# Patient Record
Sex: FEMALE | Race: WHITE | Employment: OTHER | URBAN - METROPOLITAN AREA
[De-identification: names, ages, dates, MRNs, and addresses within clinical notes are randomized per-mention and may not be internally consistent; named-entity substitution may affect disease eponyms.]

---

## 2018-03-14 ENCOUNTER — TRANSFERRED RECORDS (OUTPATIENT)
Dept: HEALTH INFORMATION MANAGEMENT | Facility: CLINIC | Age: 58
End: 2018-03-14

## 2019-01-22 PROBLEM — M21.619 BUNION: Status: ACTIVE | Noted: 2017-10-12

## 2019-01-22 NOTE — PROGRESS NOTES
"  SUBJECTIVE:   Jeannie Phelps is a 58 year old female who presents to clinic today for the following health issues:    New Patient/Transfer of Care    Hypertension Follow-up       Outpatient blood pressures are not being checked.    Low Salt Diet: not monitoring salt    Amount of exercise or physical activity: None    Problems taking medications regularly: No    Medication side effects: none    Diet: regular (no restrictions)    Prior medication trials -     Prior negative angiogram    Prior high stress job - retired this past year - gained 20 pounds     RESPIRATORY SYMPTOMS      Duration: Ongoing for 2 weeks - improving    Description  nasal congestion, sore throat and cough    Severity: mild    Accompanying signs and symptoms: None    History (predisposing factors):  none    Precipitating or alleviating factors: None    Therapies tried and outcome:  Dayquil / nyquil     No fever    No vomiting or diarrhea       Rash-   Controlled with topical steroid and Tazorac.  Continued on Singulair.  Uses as needed.    Affects trunk.  \"Felt like measles\".  Would bleed.  Pruritic.  Was on Plaquenil for 10 years.  Diagnosis of lupus made by biopsy.  Does get psoriasis like rash on scalp.    Takes Plexus -  Plant based omega, detox/cleanse, probiotic, multivitamin, cranberry concentrate 2 daily 500 mg, Ca/D 650 mg chew, and slim - blood sugar control.    Alcoholic-  No drink of choice, but wine most often.  No prior treatment.  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  Would work all day, drink in the evening.  Able to function and nobody knew.   Admits \"I like it.\"    Last night - had 4 whisky gingers.   drinks as well.    First time drunk - 10 years old - at a wedding.   Strong family history of addiction - all siblings alcoholics.  Sister left treatment and drank on her way home.  5 kids, 13 grand children.  Relocated here - 200 miles from family.    Leaving for 3 weeks to [place].    .  Last physical 3/2018.      Problem list " "and histories reviewed & adjusted, as indicated.  Additional history: as documented    Current Outpatient Medications   Medication     fluocinonide (LIDEX) 0.05 % external gel     montelukast (SINGULAIR) 10 MG tablet     Multiple Vitamins-Minerals (MULTIVITAMIN ADULT PO)     tazarotene (TAZORAC) 0.05 % external gel     No current facility-administered medications for this visit.        Patient Active Problem List   Diagnosis     Bunion     Dermatitis due to sun     Discoid lupus erythematosus     Essential hypertension     High risk medication use     Lyme disease     Symptomatic menopausal or female climacteric states     Hypertension     Alcoholism (H)     Past Surgical History:   Procedure Laterality Date      SECTION      3x     CHOLECYSTECTOMY       HYSTERECTOMY      ovaries remain; abnormal uterine bleeding     MANDIBLE SURGERY         Social History     Tobacco Use     Smoking status: Never Smoker     Smokeless tobacco: Never Used   Substance Use Topics     Alcohol use: Yes     Family History   Problem Relation Age of Onset     Breast Cancer Mother      Lung Cancer Father      Breast Cancer Maternal Grandmother      Breast Cancer Niece            Reviewed and updated as needed this visit by clinical staff       Reviewed and updated as needed this visit by Provider         ROS:  Constitutional, HEENT, cardiovascular, pulmonary, gi and gu systems are negative, except as otherwise noted.    OBJECTIVE:     /80 (BP Location: Right arm, Patient Position: Chair, Cuff Size: Adult Regular)   Pulse 100   Temp 97.9  F (36.6  C) (Tympanic)   Ht 1.664 m (5' 5.5\")   Wt 74.7 kg (164 lb 9.6 oz)   SpO2 96%   BMI 26.97 kg/m    Body mass index is 26.97 kg/m .  GENERAL: healthy, alert and no distress  EYES: Eyes grossly normal to inspection, PERRL and conjunctivae and sclerae normal  HENT: ear canals and TM's normal, nose and mouth without ulcers or lesions  NECK: no adenopathy, no asymmetry, masses, or scars " and thyroid normal to palpation  RESP: lungs clear to auscultation - no rales, rhonchi or wheezes  CV: regular rate and rhythm, normal S1 S2, no S3 or S4, no murmur, click or rub, no peripheral edema and peripheral pulses strong  ABDOMEN: soft, nontender, no hepatosplenomegaly, no masses and bowel sounds normal  MS: no gross musculoskeletal defects noted, no edema  SKIN: no suspicious lesions or rashes  PSYCH: mentation appears normal and tearful    Diagnostic Test Results:  none     ASSESSMENT/PLAN:     (Z00.00) Encounter for medical examination to establish care  (primary encounter diagnosis)  Comment: some records reviewed in care everywhere    (I10) Essential hypertension  Comment: stable  Plan: currently not on medication; alcohol likely a large contributing factor; close follow up    (F10.20) Alcoholism (H)  Comment: lengthy discussion today;  Patient in the contemplative stage.  Support offered.  Resources discussed.  Outpatient treatment available at [facility], [facility] and [facility].  Encouraged AA meetings, obtaining a sponsor.  Will schedule physical in 3/2019 when she is due.  Referral for care coordination for more assistance.  Referral for counseling - help to address her addiction.    Plan: CARE COORDINATION REFERRAL, MENTAL HEALTH         REFERRAL  - Adult; Outpatient Treatment;         Individual/Couples/Family/Group Therapy/Health         Psychology; Range: [facility] - [facility],         [place], [place] [phone]; We will         contact you to schedule the appointment or         please call ...    30 minutes spent with patient, over 50% in direct face to face counseling.    More Burris MD  [facility] - [facility]

## 2019-01-28 ENCOUNTER — OFFICE VISIT (OUTPATIENT)
Dept: FAMILY MEDICINE | Facility: OTHER | Age: 59
End: 2019-01-28
Attending: FAMILY MEDICINE
Payer: COMMERCIAL

## 2019-01-28 VITALS
SYSTOLIC BLOOD PRESSURE: 140 MMHG | TEMPERATURE: 97.9 F | WEIGHT: 164.6 LBS | DIASTOLIC BLOOD PRESSURE: 80 MMHG | HEIGHT: 66 IN | OXYGEN SATURATION: 96 % | HEART RATE: 100 BPM | BODY MASS INDEX: 26.45 KG/M2

## 2019-01-28 DIAGNOSIS — Z00.00 ENCOUNTER FOR MEDICAL EXAMINATION TO ESTABLISH CARE: Primary | ICD-10-CM

## 2019-01-28 DIAGNOSIS — I10 ESSENTIAL HYPERTENSION: ICD-10-CM

## 2019-01-28 DIAGNOSIS — F10.20 ALCOHOLISM (H): ICD-10-CM

## 2019-01-28 PROCEDURE — 99203 OFFICE O/P NEW LOW 30 MIN: CPT | Performed by: FAMILY MEDICINE

## 2019-01-28 RX ORDER — MONTELUKAST SODIUM 10 MG/1
10 TABLET ORAL DAILY
COMMUNITY
Start: 2019-01-10 | End: 2023-07-12

## 2019-01-28 RX ORDER — FLUOCINONIDE GEL 0.5 MG/G
1 GEL TOPICAL PRN
COMMUNITY
Start: 2017-01-24

## 2019-01-28 RX ORDER — TAZAROTENE 0.5 MG/G
1 GEL TOPICAL PRN
COMMUNITY
Start: 2016-08-25

## 2019-01-28 ASSESSMENT — PAIN SCALES - GENERAL: PAINLEVEL: NO PAIN (0)

## 2019-01-28 ASSESSMENT — MIFFLIN-ST. JEOR: SCORE: 1340.43

## 2019-01-28 NOTE — PATIENT INSTRUCTIONS
Schedule annual physical after 3/13/19.  Referral for care coordination services and counseling.    Outpatient chemical dependency treatment:  [facility]    Follow up 1 month - reassess progress.     Psychologists/ Counselors      Raj Sierra   [phone]  Wandy Quinns   [phone]  [facility]  [phone]  [facility] (kids) [phone]  [facility](teens) [phone]  [facility]  [phone]  [facility]  [phone]  [facility]  [phone]  [facility]       [phone]   [facility]  [phone]  [facility] [phone]  [facility]  [phone]  [facility]  [phone]     [facility] [phone]      [facility] counseling [phone]  Malick Mojo   [phone]  Wes Roth  [phone]  Nikia Overton  [phone]  [facility] [phone]  [facility]      [phone]  [facility]       [phone]  [facility]  [phone]     [place]  Antonio Medinaen   [phone]  [facility]      [phone]  [facility] [address] W[address] [place] [phone]  [facility]      [phone]  [facility]  [phone]        *Facilities in bold italics indicate medication management  Services are offered.        Crisis Text Line  http://www.crisistextline.org       The Crisis Text Line serves anyone, in any type of crisis, providing access to free, 24/7 support and information via the medium people already use and trust:     Here's how it works:  Text HOME to 844-889 from anywhere in the [place], anytime, about any type of crisis.  A live, trained Crisis Counselor receives the text and responds quickly.  The volunteer Crisis Counselor will help you move from a 'hot moment to a cool moment'

## 2019-01-28 NOTE — NURSING NOTE
"Chief Complaint   Patient presents with     Establish Care       Initial /80 (BP Location: Right arm, Patient Position: Chair, Cuff Size: Adult Regular)   Pulse 100   Temp 97.9  F (36.6  C) (Tympanic)   Ht 1.664 m (5' 5.5\")   Wt 74.7 kg (164 lb 9.6 oz)   SpO2 96%   BMI 26.97 kg/m   Estimated body mass index is 26.97 kg/m  as calculated from the following:    Height as of this encounter: 1.664 m (5' 5.5\").    Weight as of this encounter: 74.7 kg (164 lb 9.6 oz).  Medication Reconciliation: complete    Shirin Corona LPN    "

## 2019-01-29 ENCOUNTER — PATIENT OUTREACH (OUTPATIENT)
Dept: CARE COORDINATION | Facility: OTHER | Age: 59
End: 2019-01-29

## 2019-01-29 NOTE — PROGRESS NOTES
Clinic Care Coordination Contact  Care Team Conversations    Was asked to assist pt with mental health/chemical dependency resources.  After peforming chart review, discovered pt will be out of the area for 3 weeks.  Will follow up when she returns.    Trinidad Sousa, [facility]  Outpatient   [phone]

## 2019-03-06 ENCOUNTER — PATIENT OUTREACH (OUTPATIENT)
Dept: CARE COORDINATION | Facility: OTHER | Age: 59
End: 2019-03-06

## 2019-03-06 NOTE — PROGRESS NOTES
Clinic Care Coordination Contact  [facility]/Voicemail       Clinical Data: Care Coordinator Outreach  Outreach attempted x 1.  Left message on voicemail with call back information and requested return call.    Was asked to contact patient for assistance with chemical dependency resources.      Plan: Care Coordinator will mail out care coordination introduction letter with care coordinator contact information and explanation of care coordination services. Care Coordinator will try to reach patient again in 3-5 business days.    SONIDO Allred  Outpatient   [phone]

## 2019-03-06 NOTE — LETTER
[place] CARE COORDINATION  March 12, 2019    Jeannie Phelps  [address]  [place] [address]      Dear Jeannie,    I am a clinic care coordinator who works with Dr. Martinez at [facility]. I have been trying to reach you recently to introduce Clinic Care Coordination and to see if there was anything I could assist you with.  I wanted to introduce myself and provide you with my contact information so that you can call me with questions or concerns about your health care. Below is a description of clinic care coordination and how I can further assist you.     The clinic care coordinator is a registered nurse and/or  who understand the health care system. The goal of clinic care coordination is to help you manage your health and improve access to the [facility] system in the most efficient manner. The registered nurse can assist you in meeting your health care goals by providing education, coordinating services, and strengthening the communication among your providers. The  can assist you with financial, behavioral, psychosocial, chemical dependency, counseling, and/or psychiatric resources.    Please feel free to contact me at [phone], with any questions or concerns. We at [facility] are focused on providing you with the highest-quality healthcare experience possible and that all starts with you.     Sincerely,     Trinidad Sousa

## 2019-03-12 NOTE — PROGRESS NOTES
Clinic Care Coordination Contact  [facility]/Voicemail       Clinical Data: Care Coordinator Outreach  Outreach attempted x 2.  Left message on voicemail with call back information and requested return call.  Plan: Care Coordinator will mail out care coordination introduction letter with care coordinator contact information and explanation of care coordination services. Care Coordinator will do no further outreaches at this time.

## 2019-03-14 NOTE — PROGRESS NOTES
Received return call on 3/13 that pt was flying out.  Asked writer to call back.  Called pt, but she was unavailable.  Left message for her to return call when available.    Trinidad Sousa, [facility]  Outpatient   [phone]

## 2021-06-23 ENCOUNTER — TRANSFERRED RECORDS (OUTPATIENT)
Dept: HEALTH INFORMATION MANAGEMENT | Facility: CLINIC | Age: 61
End: 2021-06-23

## 2022-07-12 ENCOUNTER — HOSPITAL ENCOUNTER (EMERGENCY)
Facility: HOSPITAL | Age: 62
Discharge: HOME | End: 2022-07-12
Attending: NURSE PRACTITIONER | Admitting: NURSE PRACTITIONER
Payer: COMMERCIAL

## 2022-07-12 ENCOUNTER — APPOINTMENT (OUTPATIENT)
Dept: ULTRASOUND IMAGING | Facility: HOSPITAL | Age: 62
End: 2022-07-12
Attending: NURSE PRACTITIONER
Payer: COMMERCIAL

## 2022-07-12 VITALS
HEART RATE: 87 BPM | DIASTOLIC BLOOD PRESSURE: 100 MMHG | RESPIRATION RATE: 16 BRPM | SYSTOLIC BLOOD PRESSURE: 154 MMHG | TEMPERATURE: 98.4 F | OXYGEN SATURATION: 97 %

## 2022-07-12 DIAGNOSIS — T14.8XXA HEMATOMA OF SKIN: ICD-10-CM

## 2022-07-12 PROCEDURE — 99213 OFFICE O/P EST LOW 20 MIN: CPT | Performed by: NURSE PRACTITIONER

## 2022-07-12 PROCEDURE — G0463 HOSPITAL OUTPT CLINIC VISIT: HCPCS | Mod: 25

## 2022-07-12 PROCEDURE — 93971 EXTREMITY STUDY: CPT | Mod: LT

## 2022-07-12 RX ORDER — EPINEPHRINE 0.3 MG/.3ML
INJECTION SUBCUTANEOUS
COMMUNITY
Start: 2021-09-02 | End: 2023-09-12

## 2022-07-12 ASSESSMENT — ENCOUNTER SYMPTOMS
LIGHT-HEADEDNESS: 0
VOMITING: 0
FATIGUE: 1
CHILLS: 0
APPETITE CHANGE: 0
COLOR CHANGE: 1
NUMBNESS: 0
HEADACHES: 0
NAUSEA: 0
DIZZINESS: 0
SHORTNESS OF BREATH: 0
ACTIVITY CHANGE: 1
FEVER: 0

## 2022-07-12 NOTE — DISCHARGE INSTRUCTIONS
Keep affected extremity elevated as much as possible for next 24 - 48 hours. Ice to affected area 20 minutes every hour as needed for comfort. After 48 hours you can apply heat. Ibuprofen 600 to 800 mg (3 - 4 tabs of over the counter med) every six to eight hours as needed;not to exceed maximum amount of 3200 mg in 24 hours. Take with food. Tylenol 650 to 1000 mg every four to six hours as needed (not to exceed more than 4000 mg in a 24 hour period). May use interchangeably. Suggest medicating around the clock for the next 24-48 hours. Use ace wrap  until you are no longer having discomfort in your leg.  Slowly start to wiggle your toes and move foot and leg as often as possible but not beyond the point of pain. Follow up with primary provider as needed

## 2022-07-12 NOTE — ED PROVIDER NOTES
History     Chief Complaint   Patient presents with     Leg Pain     HPI  Jeannie Phelps is a 62 year old female who presents with left leg pain that started 2 days ago.  Pain radiates up into her posterior thigh and down into the posterior calf.  Accompanied with fatigue.  Has taken ibuprofen yesterday afternoon that did help to decrease her symptoms a little bit.  Has been doing quite a bit of yard work this past week.  Has ecchymotic area behind left knee.  Has a history of varicose veins.  Had varicose veins removed 15 years ago.  Concerns regarding DVT.  Non-smoker. Denies fevers, chills, nausea, vomiting, diarrhea, and shortness of breath.    Musculoskeletal problem/pain      Duration: two days ago    Description  Location: left leg    Intensity:  4/10    Accompanying signs and symptoms: radiation of pain to posterior thigh and calf    History  Previous similar problem: YES- had varicose veins removed 15 years ago  Previous evaluation:  none    Precipitating or alleviating factors:  Trauma or overuse: YES- has been doing yard work  Aggravating factors include: sitting and unable to sleep last night because of discomfort    Therapies tried and outcome:  Ibuprofen yesterday afternoon did help to decrease symptoms little bit.     Allergies:  Allergies   Allergen Reactions     Rofecoxib Other (See Comments)     Pt had problems with bleeding with urination, Verified in Meditech: Y, Severity in [employer]itech: S, Vioxx TABS     Hydrocodone Itching     Nerve sensation      Influenza Vaccines      pt's heart races., Verified in Meditech: Y, Severity in [employer]itech: S     Metoprolol Other (See Comments)     Hot flashes     Amlodipine Other (See Comments) and Palpitations     Hot flashes     Cefuroxime Rash     Cefuroxime Axetil TABS - Rash     Lisinopril Other (See Comments) and Palpitations       Problem List:    Patient Active Problem List    Diagnosis Date Noted     Hypertension      Priority: Medium     Alcoholism  (H)      Priority: Medium     Bunion 10/12/2017     Priority: Medium     Overview:   Added automatically from request for surgery [id number]       Essential hypertension 2016     Priority: Medium     High risk medication use 2014     Priority: Medium     Dermatitis due to sun 2013     Priority: Medium     Symptomatic menopausal or female climacteric states 2013     Priority: Medium     Lyme disease 2013     Priority: Medium     Overview:   Lyme Disease-Deer tick bite 13.  Initially noted a red target lesion on   right lower back       Discoid lupus erythematosus 2008     Priority: Medium        Past Medical History:    Past Medical History:   Diagnosis Date     Alcoholism (H)      Hypertension        Past Surgical History:    Past Surgical History:   Procedure Laterality Date      SECTION      3x     CHOLECYSTECTOMY       HYSTERECTOMY VAGINAL      Cervix status unknown. Bilateral ovaries remain; Performed for abnormal uterine bleeding. Per [facility] Everywhere Summary.      MANDIBLE SURGERY         Family History:    Family History   Problem Relation Age of Onset     Breast Cancer Mother      Lung Cancer Father      Breast Cancer Maternal Grandmother      Alcoholism Brother      Alcoholism Sister      Breast Cancer Niece        Social History:  Marital Status:   [2]  Social History     Tobacco Use     Smoking status: Never Smoker     Smokeless tobacco: Never Used   Substance Use Topics     Alcohol use: Yes     Drug use: No        Medications:    EPINEPHrine (ANY BX GENERIC EQUIV) 0.3 MG/0.3ML injection 2-pack  fluocinonide (LIDEX) 0.05 % external gel  montelukast (SINGULAIR) 10 MG tablet  Multiple Vitamins-Minerals (MULTIVITAMIN ADULT PO)  tazarotene (TAZORAC) 0.05 % external gel          Review of Systems   Constitutional: Positive for activity change and fatigue. Negative for appetite change, chills and fever.   Respiratory: Negative for  shortness of breath.    Gastrointestinal: Negative for nausea and vomiting.   Genitourinary: Negative.    Musculoskeletal:        Hot burning sensation posterior knee that radiates into posterior calf and thigh (3/4th). Fourth and third toes have same sensation.   Skin: Positive for color change.   Neurological: Negative for dizziness, light-headedness, numbness and headaches.       Physical Exam   BP: (!) 177/119 (working with PCP to find the right blood pressure med. Appt. Monday)  Pulse: 90  Temp: 98.4  F (36.9  C)  Resp: 16  SpO2: 97 %      Physical Exam  Vitals and nursing note reviewed.   Constitutional:       General: She is in acute distress (Mild).      Appearance: She is overweight.   Cardiovascular:      Rate and Rhythm: Normal rate and regular rhythm.      Heart sounds: Normal heart sounds. No murmur heard.  Pulmonary:      Effort: Pulmonary effort is normal. No respiratory distress.      Breath sounds: Normal breath sounds. No wheezing or rales.   Musculoskeletal:         General: Tenderness present. No swelling.        Legs:    Skin:     Capillary Refill: Capillary refill takes less than 2 seconds.      Findings: Bruising present. No erythema.   Neurological:      Mental Status: She is alert and oriented to person, place, and time.   Psychiatric:         Behavior: Behavior normal.         ED Course                 Procedures             Results for orders placed or performed during the hospital encounter of 07/12/22 (from the past 24 hour(s))   US Lower Extremity Venous Duplex Left    Narrative    Exam:US LOWER EXTREMITY VENOUS DUPLEX LEFT    History: Left leg pain. Bruising    Comparisons:    Technique: Venous duplex ultrasonography of the left lower extremity  was performed.     Findings: The common femoral vein, superficial femoral vein and  popliteal vein are fully compressible with spontaneous and augmentable  venous flow. There is some increased echogenicity seen in the area of  the bruise in the  subcutaneous fat           Impression    Impression: No evidence of deep venous thrombosis within the left  lower extremity.    GAVINO BRADSHAW MD         SYSTEM ID:  [id number]       Medications - No data to display    Assessments & Plan (with Medical Decision Making)     I have reviewed the nursing notes.    I have reviewed the findings, diagnosis, plan and need for follow up with the patient.  (T14.8XXA) Hematoma of skin  Comment:  62 year old female who presents with left leg pain that started 2 days ago.  Pain radiates up into her posterior thigh and down into the posterior calf.  Accompanied with fatigue.  Has taken ibuprofen yesterday afternoon that did help to decrease her symptoms a little bit.  Has been doing quite a bit of yard work this past week.  Has ecchymotic area behind left knee.  Has a history of varicose veins.  Had varicose veins removed 15 years ago.  Concerns regarding DVT.  Non-smoker. Denies fevers, chills, nausea, vomiting, diarrhea, and shortness of breath.    MDM: NHT. Lungs CTA  3 cm hematoma noted superior to left knee on medial, posterior, left leg  Left popliteal and pedal pulses obtained with Doppler    Ultrasound per radiology; no evidence of deep venous thrombosis within the left lower extremity    Ace wrap applied to left knee per LPN.    Refused pain medication.    Plan: Education provided for hematoma.  Keep affected extremity elevated as much as possible for next 24 - 48 hours. Ice to affected area 20 minutes every hour as needed for comfort. After 48 hours you can apply heat. Ibuprofen 600 to 800 mg (3 - 4 tabs of over the counter med) every six to eight hours as needed;not to exceed maximum amount of 3200 mg in 24 hours. Take with food. Tylenol 650 to 1000 mg every four to six hours as needed (not to exceed more than 4000 mg in a 24 hour period). May use interchangeably. Suggest medicating around the clock for the next 24-48 hours. Use ace wrap  until you are no longer having  discomfort in your leg.  Slowly start to wiggle your toes and move foot and leg as often as possible but not beyond the point of pain. Follow up with primary provider as needed  These discharge instructions and medications were reviewed with her and understanding verbalized.    This document was prepared using a combination of typing and voice generated software.  While every attempt was made for accuracy, spelling and grammatical errors may exist.    Discharge Medication List as of 7/12/2022  1:51 PM          Final diagnoses:   Hematoma of skin       7/12/2022   [facility]       Tiffanie Damon, CNP  07/12/22 7810

## 2022-07-12 NOTE — ED TRIAGE NOTES
"Patient reports seeing chiropracter today when they noticed that patient has a hematoma in back of left leg, above knee.  Patient reports that pain in the back started a couple days ago. Patient reports that it is hot and burning and \"feels deep.\"      "

## 2022-07-12 NOTE — ED TRIAGE NOTES
Patient presents to urgent care for possible blood clot behind the left knee. Patient went to the chiropractor today and the chiropractor noticed a hematoma behind the left leg above the knee. Patient was told to go to her doctor. [place] [facility] [place] to go to come to the ER.

## 2022-07-18 ENCOUNTER — TRANSFERRED RECORDS (OUTPATIENT)
Dept: SURGERY | Facility: HOSPITAL | Age: 62
End: 2022-07-18

## 2023-03-15 ENCOUNTER — APPOINTMENT (OUTPATIENT)
Dept: CT IMAGING | Facility: HOSPITAL | Age: 63
End: 2023-03-15
Attending: EMERGENCY MEDICINE
Payer: COMMERCIAL

## 2023-03-15 ENCOUNTER — HOSPITAL ENCOUNTER (EMERGENCY)
Facility: HOSPITAL | Age: 63
Discharge: ANOTHER HOSPITAL | End: 2023-03-15
Attending: EMERGENCY MEDICINE | Admitting: EMERGENCY MEDICINE
Payer: COMMERCIAL

## 2023-03-15 VITALS
SYSTOLIC BLOOD PRESSURE: 150 MMHG | HEART RATE: 89 BPM | DIASTOLIC BLOOD PRESSURE: 86 MMHG | OXYGEN SATURATION: 97 % | TEMPERATURE: 97.7 F | BODY MASS INDEX: 27.24 KG/M2 | RESPIRATION RATE: 18 BRPM | WEIGHT: 166.2 LBS

## 2023-03-15 DIAGNOSIS — I63.9 CEREBROVASCULAR ACCIDENT (CVA), UNSPECIFIED MECHANISM (H): ICD-10-CM

## 2023-03-15 LAB
ANION GAP SERPL CALCULATED.3IONS-SCNC: 11 MMOL/L (ref 7–15)
APTT PPP: 29 SECONDS (ref 22–38)
BASOPHILS # BLD AUTO: 0.1 10E3/UL (ref 0–0.2)
BASOPHILS NFR BLD AUTO: 1 %
BUN SERPL-MCNC: 11.9 MG/DL (ref 8–23)
CALCIUM SERPL-MCNC: 9.9 MG/DL (ref 8.8–10.2)
CHLORIDE SERPL-SCNC: 102 MMOL/L (ref 98–107)
CREAT SERPL-MCNC: 0.63 MG/DL (ref 0.51–0.95)
DEPRECATED HCO3 PLAS-SCNC: 26 MMOL/L (ref 22–29)
EOSINOPHIL # BLD AUTO: 0.1 10E3/UL (ref 0–0.7)
EOSINOPHIL NFR BLD AUTO: 2 %
ERYTHROCYTE [DISTWIDTH] IN BLOOD BY AUTOMATED COUNT: 12.2 % (ref 10–15)
GFR SERPL CREATININE-BSD FRML MDRD: >90 ML/MIN/1.73M2
GLUCOSE BLDC GLUCOMTR-MCNC: 81 MG/DL (ref 70–99)
GLUCOSE SERPL-MCNC: 84 MG/DL (ref 70–99)
HCT VFR BLD AUTO: 41 % (ref 35–47)
HGB BLD-MCNC: 14.2 G/DL (ref 11.7–15.7)
HOLD SPECIMEN: NORMAL
IMM GRANULOCYTES # BLD: 0 10E3/UL
IMM GRANULOCYTES NFR BLD: 0 %
INR PPP: 0.97 (ref 0.85–1.15)
LYMPHOCYTES # BLD AUTO: 2.2 10E3/UL (ref 0.8–5.3)
LYMPHOCYTES NFR BLD AUTO: 40 %
MCH RBC QN AUTO: 35.4 PG (ref 26.5–33)
MCHC RBC AUTO-ENTMCNC: 34.6 G/DL (ref 31.5–36.5)
MCV RBC AUTO: 102 FL (ref 78–100)
MONOCYTES # BLD AUTO: 0.5 10E3/UL (ref 0–1.3)
MONOCYTES NFR BLD AUTO: 9 %
NEUTROPHILS # BLD AUTO: 2.5 10E3/UL (ref 1.6–8.3)
NEUTROPHILS NFR BLD AUTO: 48 %
NRBC # BLD AUTO: 0 10E3/UL
NRBC BLD AUTO-RTO: 0 /100
PLATELET # BLD AUTO: 148 10E3/UL (ref 150–450)
POTASSIUM SERPL-SCNC: 4.2 MMOL/L (ref 3.4–5.3)
RBC # BLD AUTO: 4.01 10E6/UL (ref 3.8–5.2)
SARS-COV-2 RNA RESP QL NAA+PROBE: NEGATIVE
SODIUM SERPL-SCNC: 139 MMOL/L (ref 136–145)
TROPONIN T SERPL HS-MCNC: 8 NG/L
WBC # BLD AUTO: 5.3 10E3/UL (ref 4–11)

## 2023-03-15 PROCEDURE — 70496 CT ANGIOGRAPHY HEAD: CPT

## 2023-03-15 PROCEDURE — 70450 CT HEAD/BRAIN W/O DYE: CPT

## 2023-03-15 PROCEDURE — 99291 CRITICAL CARE FIRST HOUR: CPT | Mod: 25

## 2023-03-15 PROCEDURE — 80048 BASIC METABOLIC PNL TOTAL CA: CPT | Performed by: EMERGENCY MEDICINE

## 2023-03-15 PROCEDURE — 85610 PROTHROMBIN TIME: CPT | Performed by: EMERGENCY MEDICINE

## 2023-03-15 PROCEDURE — 99292 CRITICAL CARE ADDL 30 MIN: CPT

## 2023-03-15 PROCEDURE — 85004 AUTOMATED DIFF WBC COUNT: CPT | Performed by: EMERGENCY MEDICINE

## 2023-03-15 PROCEDURE — C9803 HOPD COVID-19 SPEC COLLECT: HCPCS

## 2023-03-15 PROCEDURE — 84484 ASSAY OF TROPONIN QUANT: CPT | Performed by: EMERGENCY MEDICINE

## 2023-03-15 PROCEDURE — U0005 INFEC AGEN DETEC AMPLI PROBE: HCPCS | Performed by: EMERGENCY MEDICINE

## 2023-03-15 PROCEDURE — 250N000011 HC RX IP 250 OP 636: Performed by: STUDENT IN AN ORGANIZED HEALTH CARE EDUCATION/TRAINING PROGRAM

## 2023-03-15 PROCEDURE — 36415 COLL VENOUS BLD VENIPUNCTURE: CPT | Performed by: EMERGENCY MEDICINE

## 2023-03-15 PROCEDURE — 96375 TX/PRO/DX INJ NEW DRUG ADDON: CPT | Mod: XU

## 2023-03-15 PROCEDURE — 250N000011 HC RX IP 250 OP 636

## 2023-03-15 PROCEDURE — 85730 THROMBOPLASTIN TIME PARTIAL: CPT | Performed by: EMERGENCY MEDICINE

## 2023-03-15 PROCEDURE — 96365 THER/PROPH/DIAG IV INF INIT: CPT | Mod: XU

## 2023-03-15 PROCEDURE — 93005 ELECTROCARDIOGRAM TRACING: CPT

## 2023-03-15 PROCEDURE — 99285 EMERGENCY DEPT VISIT HI MDM: CPT | Performed by: STUDENT IN AN ORGANIZED HEALTH CARE EDUCATION/TRAINING PROGRAM

## 2023-03-15 PROCEDURE — 250N000011 HC RX IP 250 OP 636: Performed by: EMERGENCY MEDICINE

## 2023-03-15 PROCEDURE — 70498 CT ANGIOGRAPHY NECK: CPT

## 2023-03-15 PROCEDURE — 999N000157 HC STATISTIC RCP TIME EA 10 MIN

## 2023-03-15 PROCEDURE — 82962 GLUCOSE BLOOD TEST: CPT

## 2023-03-15 RX ORDER — SODIUM CHLORIDE 9 MG/ML
INJECTION, SOLUTION INTRAVENOUS CONTINUOUS PRN
Status: DISCONTINUED | OUTPATIENT
Start: 2023-03-15 | End: 2023-03-15 | Stop reason: HOSPADM

## 2023-03-15 RX ORDER — LISINOPRIL 5 MG/1
5 TABLET ORAL DAILY
COMMUNITY
End: 2023-07-12

## 2023-03-15 RX ORDER — ROSUVASTATIN CALCIUM 5 MG/1
5 TABLET, COATED ORAL DAILY
COMMUNITY
End: 2023-07-12

## 2023-03-15 RX ORDER — HYDRALAZINE HYDROCHLORIDE 20 MG/ML
10 INJECTION INTRAMUSCULAR; INTRAVENOUS EVERY 10 MIN PRN
Status: DISCONTINUED | OUTPATIENT
Start: 2023-03-15 | End: 2023-03-15 | Stop reason: HOSPADM

## 2023-03-15 RX ORDER — IOPAMIDOL 755 MG/ML
45 INJECTION, SOLUTION INTRAVASCULAR ONCE
Status: COMPLETED | OUTPATIENT
Start: 2023-03-15 | End: 2023-03-15

## 2023-03-15 RX ORDER — LABETALOL 20 MG/4 ML (5 MG/ML) INTRAVENOUS SYRINGE
10 EVERY 10 MIN PRN
Status: DISCONTINUED | OUTPATIENT
Start: 2023-03-15 | End: 2023-03-15 | Stop reason: HOSPADM

## 2023-03-15 RX ADMIN — NICARDIPINE HYDROCHLORIDE 2.5 MG/HR: 0.2 INJECTION, SOLUTION INTRAVENOUS at 07:36

## 2023-03-15 RX ADMIN — LABETALOL HYDROCHLORIDE 10 MG: 5 INJECTION, SOLUTION INTRAVENOUS at 07:42

## 2023-03-15 RX ADMIN — Medication 19 MG: at 07:52

## 2023-03-15 RX ADMIN — IOPAMIDOL 45 ML: 755 INJECTION, SOLUTION INTRAVENOUS at 07:09

## 2023-03-15 ASSESSMENT — ACTIVITIES OF DAILY LIVING (ADL)
ADLS_ACUITY_SCORE: 35
ADLS_ACUITY_SCORE: 35

## 2023-03-15 ASSESSMENT — ENCOUNTER SYMPTOMS
SHORTNESS OF BREATH: 0
FEVER: 0
CHILLS: 0
COUGH: 0

## 2023-03-15 NOTE — ED NOTES
0600 pt was in kitchen and suddenly got dizzy and her right arm stopped working and feeling right. Pt awoke 0430 felt completely normal. 0711 right leg started feeling weird, and shaky.

## 2023-03-15 NOTE — CONSULTS
"  [facility]    Stroke Telephone Note    I was called by Terry Eaton on 03/15/23 regarding patient Jeannie Phelps.     Haim Redd is a 62 YO F w/vascular RFs: HTN on lisinopril who presents on 3/15 as a tier 1 stroke code with acute onset RUE paresis.     LKW: Woke up at 0430 w/o sx's.   First seen sick: Noted acute onset RUE paresis at 0600 while in shower.     In ED exam notable for RUE paresis (antigravity but reduced throughout, sig distal weakness).     In ED /95. Initial labs platelets 148.    NCCT: Unremarkable.   CTA H/N: No high grade stenosis    Stroke Code Data (for stroke code without tele)  Stroke code activated 03/15/23   0655   Stroke provider first response  03/15/23   0659            Last known normal 03/15/23   0430        Time of discovery   (or onset of symptoms) 03/15/23   0600   Head CT read by Stroke Neuro Dr/Provider 03/15/23       Was stroke code de-escalated?                  Intravenous Thrombolysis  Pending Televideo exam    Endovascular Treatment  Not initiated due to absence of proximal vessel occlusion    Impression/Recommendations  # Stroke by clinical exam. No CI to TNK, coag labs, platelets appropriate.   - Telestroke team will see on camera to determine TNK appropriateness - signout completed, N team aware and taking over case - appreciate team effort/assistance     My recommendations are based on the information provided over the phone by Jeannie Phelps's in-person providers. They are not intended to replace the clinical judgment of her in-person providers. I was not requested to personally see or examine the patient at this time.    The Stroke Staff is Dr. Vang.    Jhonny Merlos MD  Vascular Neurology Fellow    To page me or covering stroke neurology team member, click here: AMCOM  Choose \"On Call\" tab at top, then select \"NEUROLOGY/ALL SITES\" from middle drop-down box, press Enter, then look for \"stroke\" or \"telestroke\" for your " site.

## 2023-03-15 NOTE — DISCHARGE INSTRUCTIONS
What to expect when you have contrast    During your exam, we will inject  contrast  into your vein or artery. (Contrast is a clear liquid with iodine in it. It shows up on X-rays.)    You may feel warm or hot. You may have a metal taste in your mouth and a slight upset stomach. You may also feel pressure near the kidneys and bladder. These effects will last about 1 to 3 minutes.    Please tell us if you have:   Sneezing    Itching   Hives    Swelling in the face   A hoarse voice   Breathing problems   Other new symptoms    Serious problems are rare.  They may include:   Irregular heartbeat    Seizures   Kidney failure             Tissue damage   Shock     Death    If you have any problems during the exam, we  will treat them right away.    When you get home    Call your hospital if you have any new symptoms in the next 2 days, like hives or swelling. (Phone numbers are at the bottom of this page.) Or call your family doctor.     If you have wheezing or trouble breathing, call 911.    Self-care  -Drink at least 4 extra glasses of water today.   This reduces the stress on your kidneys.  -Keep taking your regular medicines.    The contrast will pass out of your body in your  Urine(pee). This will happen in the next 24 hours. You  will not feel this. Your urine will not  change color.    If you have kidney problems or take metformin    Drink 4 to 8 large glasses of water for the next  2 days, if you are not on a fluid restriction.    ?If you take metformin (Glucophage or Glucovance) for diabetes, keep taking it.      ?Your kidney function tests are abnormal.  If you take Metformin, do not take it for 48 hours. Please go to your clinic for a blood test within 3 days after your exam before the restarting this medicine.     (Note to provider:please give patient prescription for lab tests.)    ?Special instructions: -    I have read and understand the above information.    Patient Sign  Here:______________________________________Date:________Time:______    Staff Sign Here:________________________________________Date:_______Time:______      Radiology Departments:     ?[facility]: [phone] ?[place]: [phone]     ?[place]: [phone] ?[place]:[phone]      ?[place]: [phone]  ?[place]: [phone]  ?[place]:[phone]    ?[facility]:[phone]  ?[facility]:[phone]

## 2023-03-15 NOTE — CONSULTS
[facility]    Stroke Consult Note    Reason for Consult: Stroke Code     Chief Complaint: No chief complaint on file.      HPI  Jeannie Phelps is a 63 year old female with a PMHx significant for HTN, lupus, alcohol use disorder. She woke up this morning at her baseline around 0430. Around 0545, she had sudden onset dizziness and right arm weakness, right leg weakness (arm weaker than leg), and incoordination.   She is not on aspirin or any anticoagulation. She does take ibuprofen daily for chronic back pain. There is a diagnosis of lupus in her chart. She reports that she was diagnosed years ago, was on plaquenil for ~ 10 years, and then was told it was a misdiagnosis.   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   No history of bleeding/clotting disorders, no PEs, no DVTs, no recurrent miscarriages.   TNK given 0753 3/15 after blood pressure was controlled with nicardipine and labetalol.   Patient and her  were updated on our findings and plan of care during telestroke visit. Questions answered; they are in agreement with the plan.     Imaging Findings  CT Head  1. No acute intracranial hemorrhage  2. ASPECT SCORE 10/10.    CTA Head/Neck (prelim read, radiology final read pending)  No LVO, no significant stenoses     CBC RESULTS: Recent Labs   Lab Test 03/15/23  0659   WBC 5.3   RBC 4.01   HGB 14.2   HCT 41.0   *   MCH 35.4*   MCHC 34.6   RDW 12.2   *     INR   Date Value Ref [place] Status   03/15/2023 0.97 0.85 - 1.15 Final        Intravenous Thrombolysis  Risks (including potential for bleeding and death), benefits, and alternatives to thrombolytic therapy were discussed with Patient and Family. Prior to tenecteplase (TNK) administration, the following issues were addressed:   - hypertension requiring aggressive control with IV medications    Endovascular Treatment  Not initiated due to absence of proximal vessel occlusion    Impression   Right  "hemiparesis (UE weaker than LE) and subtle right facial weakness concerning for acute stroke s/p TNK     Recommendations  - Use orderset: \"Ischemic Stroke Post-Thrombolytics/Thrombectomy ICU Admission\"  - Neurochecks and vital signs per post-thrombolytic orders and monitor closely for any evidence of CNS hemorrhage, bleeding, or orolingual angioedema  - Goal BP < 180 / 105  - Hold all antithrombotic and anticoagulant medications for 24 hrs post-thrombolytic  - Hold pharmacologic VTE prophylaxis for 24 hrs post-thrombolytic  - Statin:  Continue PTA rosuvastatin 5 mg daily, may need to titrate based on lipid panel results, goal LDL 40-70  - Repeat Head CT 24 hrs post-thrombolytic  - MRI Brain with and without contrast (non-urgent)  - TTE (with Bubble Study if age 60 yrs or less)  - Telemetry, EKG  - Bedside Glucose Monitoring, euglycemia   - Nutrition: Mediterranean diet recommended   - A1c, Lipid Panel, Troponin x 3  - PT/OT/SLP  - Stroke Education/Stroke Class per [facility]  - Depression Screen  - Apnea screening positive; please place referral to sleep clinic   - Euthermia     Patient Follow-up     Recommendations pending work-up    Thank you for this consult. Patient being transferred outside of [facility] [place] system due to pt requiring higher level of care and bed availability.      Bhavna Keith NP  Vascular Neurology    To page me or covering stroke neurology team member, click here: [facility]  Choose \"On Call\" tab at top, then select \"NEUROLOGY/ALL SITES\" from middle drop-down box, press Enter, then look for \"stroke\" or \"telestroke\" for your site.    ______________________________________________________    Clinically Significant Risk Factors Present on Admission                  # Hypertension: home medication list includes antihypertensive(s)            Past Medical History   Past Medical History:   Diagnosis Date     Alcoholism (H)      Hypertension      Past Surgical History   Past " Surgical History:   Procedure Laterality Date      SECTION      3x     CHOLECYSTECTOMY       HYSTERECTOMY VAGINAL  2008    Cervix status unknown. Bilateral ovaries remain; Performed for abnormal uterine bleeding. Per [facility] Everywhere Summary.      MANDIBLE SURGERY       Medications   Home Meds  Prior to Admission medications    Medication Sig Start Date End Date Taking? Authorizing Provider   EPINEPHrine (ANY BX GENERIC EQUIV) 0.3 MG/0.3ML injection 2-pack INJECT CONTENTS OF 1 PEN AS NEEDED FOR ALLERGIC REACTION 21  Yes Reported, Patient   fluocinonide (LIDEX) 0.05 % external gel Apply 1 drop topically as needed 17  Yes Reported, Patient   lisinopril (ZESTRIL) 5 MG tablet Take 5 mg by mouth daily   Yes Reported, Patient   montelukast (SINGULAIR) 10 MG tablet Take 10 mg by mouth daily 1/10/19  Yes Reported, Patient   Multiple Vitamins-Minerals (MULTIVITAMIN ADULT PO) Take 1 tablet by mouth daily   Yes Reported, Patient   rosuvastatin (CRESTOR) 5 MG tablet Take 5 mg by mouth daily   Yes Reported, Patient   tazarotene (TAZORAC) 0.05 % external gel Apply 1 drop topically as needed 16   Reported, Patient       Scheduled Meds      Infusion Meds      PRN Meds      Allergies   Allergies   Allergen Reactions     Rofecoxib Other (See Comments)     Pt had problems with bleeding with urination, Verified in Meditech: Y, Severity in Meditech: S, Vioxx TABS     Hydrocodone Itching     Nerve sensation      Influenza Vaccines      pt's heart races., Verified in Meditech: Y, Severity in Meditech: S     Metoprolol Other (See Comments)     Hot flashes     Amlodipine Other (See Comments) and Palpitations     Hot flashes     Cefuroxime Rash     Cefuroxime Axetil TABS - Rash     Lisinopril Other (See Comments) and Palpitations     Family History   Family History   Problem Relation Age of Onset     Breast Cancer Mother      Lung Cancer Father      Breast Cancer Maternal Grandmother      Alcoholism  Brother      Alcoholism Sister      Breast Cancer Niece      Social History   Social History     Tobacco Use     Smoking status: Never     Smokeless tobacco: Never   Substance Use Topics     Alcohol use: Yes     Drug use: No       Review of Systems   The 10 point Review of Systems is negative other than noted in the HPI or here.        PHYSICAL EXAMINATION  Temp:  [97.7  F (36.5  C)] 97.7  F (36.5  C)  Pulse:  [92-96] 92  Resp:  [18] 18  BP: (165-205)/() 170/99  SpO2:  [95 %-98 %] 95 %     Neuro Exam  Mental Status:  alert, oriented x 3, follows commands, speech clear and fluent, naming and repetition normal  Cranial Nerves:  visual fields intact (tested by nurse), EOMI with normal smooth pursuit, facial sensation intact and symmetric (tested by nurse), subtle right facial weakness, hearing not formally tested but intact to conversation, no dysarthria, shoulder shrug equal bilaterally, tongue protrusion midline  Motor:  no abnormal movements, pronator drift in RUE (strength grossly 2/5) and RLE (strength 4/5)   Reflexes:  unable to test (telestroke)  Sensory:  light touch sensation intact and symmetric throughout upper and lower extremities (assessed by nurse), no extinction on double simultaneous stimulation (assessed by nurse)  Coordination:  normal finger-to-nose and heel-to-shin bilaterally without dysmetria  Station/Gait:  unable to test due to telestroke    Dysphagia Screen  Per Nursing    Stroke Scales    NIHSS  1a. Level of Consciousness 0-->Alert, keenly responsive   1b. LOC Questions 0-->Answers both questions correctly   1c. LOC Commands 0-->Performs both tasks correctly   2.   Best Gaze 0-->Normal   3.   Visual 0-->No visual loss   4.   Facial Palsy 1-->Minor paralysis (flattened nasolabial fold, asymmetry on smiling)   5a. Motor Arm, Left 0-->No drift, limb holds 90 (or 45) degrees for full 10 secs   5b. Motor Arm, Right 2-->Some effort against gravity, limb cannot get to or maintain (if cued) 90  (or 45) degrees, drifts down to bed, but has some effort against gravity   6a. Motor Leg, Left 0-->No drift, leg holds 30 degree position for full 5 secs   6b. Motor Leg, right 1-->Drift, leg falls by the end of the 5-sec period but does not hit bed   7.   Limb Ataxia 0-->Absent   8.   Sensory 0-->Normal, no sensory loss   9.   Best Language 0-->No aphasia, normal   10. Dysarthria 0-->Normal   11. Extinction and Inattention  0-->No abnormality   Total 4 (03/15/23 0718)       Modified Dundy Score (Pre-morbid)  0 - No symptoms.    Imaging  I personally reviewed all imaging; relevant findings per HPI.     Lab Results Data   CBC  Recent Labs   Lab 03/15/23  0659   WBC 5.3   RBC 4.01   HGB 14.2   HCT 41.0   *     Basic Metabolic Panel    Recent Labs   Lab 03/15/23  0659 03/15/23  0654     --    POTASSIUM 4.2  --    CHLORIDE 102  --    CO2 26  --    BUN 11.9  --    CR 0.63  --    GLC 84 81   ERIKA 9.9  --      Liver Panel  No results for input(s): PROTTOTAL, ALBUMIN, BILITOTAL, ALKPHOS, AST, ALT, BILIDIRECT in the last 168 hours.  INR    Recent Labs   Lab Test 03/15/23  0659   INR 0.97      Lipid Profile  No lab results found.  A1C  No lab results found.  Troponin    Recent Labs   Lab 03/15/23  0659   CTROPT 8          Stroke Code Data Data   Stroke Code Data  (for stroke code with tele)  Stroke code activated 03/15/23   0655   First stroke provider response 03/15/23   0659   Video start time 03/15/23   0718   Video end time 03/15/23   0758   Last known normal 03/15/23   0430   Time of discovery  (or onset of symptoms)  03/15/23   0545   Head CT read by Stroke Neuro Dr/Provider 03/15/23   0715   Was stroke code de-escalated? No               Telestroke Service Details  Type of service telemedicine diagnostic assessment of acute neurological changes   Reason telemedicine is appropriate patient requires assessment with a specialist for diagnosis and treatment of neurological symptoms   Mode of transmission  secure interactive audio and video communication per [facility]onse   Originating site (patient location) [facility]    Distant site (provider location) [facility], [place]       I personally examined and evaluated the patient today. At the time of my evaluation and management the patient was in critical condition today due to acute stroke. I personally managed review of labs and images and neuro exam. Key decisions made today included: treat with thrombolysis, admit for stroke w/u. I spent a total of 60 minutes providing critical care services, evaluating the patient, directing care and reviewing laboratory values and radiologic reports.

## 2023-03-15 NOTE — ED PROVIDER NOTES
History     Chief Complaint   Patient presents with     Stroke Symptoms     HPI  Jeannie Phelps is a 63 year old female who is here with right arm weakness.  Awoke at 0430 with no symptoms.  Took a shower.  Around 0600, felt right arm weakness.  No other symptoms.  Came here.  Does not take blood thinners.  Only comorbidity is hypertension.    Allergies:  Allergies   Allergen Reactions     Rofecoxib Other (See Comments)     Pt had problems with bleeding with urination, Verified in Meditech: Y, Severity in Meditech: S, Vioxx TABS     Hydrocodone Itching     Nerve sensation      Influenza Vaccines      pt's heart races., Verified in Meditech: Y, Severity in Meditech: S     Metoprolol Other (See Comments)     Hot flashes     Amlodipine Other (See Comments) and Palpitations     Hot flashes     Cefuroxime Rash     Cefuroxime Axetil TABS - Rash     Lisinopril Other (See Comments) and Palpitations       Problem List:    Patient Active Problem List    Diagnosis Date Noted     Hypertension      Priority: Medium     Alcoholism (H)      Priority: Medium     Bunion 10/12/2017     Priority: Medium     Overview:   Added automatically from request for surgery [id number]       Essential hypertension 2016     Priority: Medium     High risk medication use 2014     Priority: Medium     Dermatitis due to sun 2013     Priority: Medium     Symptomatic menopausal or female climacteric states 2013     Priority: Medium     Lyme disease 2013     Priority: Medium     Overview:   Lyme Disease-Deer tick bite 13.  Initially noted a red target lesion on   right lower back       Discoid lupus erythematosus 2008     Priority: Medium        Past Medical History:    Past Medical History:   Diagnosis Date     Alcoholism (H)      Hypertension        Past Surgical History:    Past Surgical History:   Procedure Laterality Date      SECTION      3x     CHOLECYSTECTOMY       HYSTERECTOMY VAGINAL       Cervix status unknown. Bilateral ovaries remain; Performed for abnormal uterine bleeding. Per [facility] Care Everywhere Summary.      MANDIBLE SURGERY         Family History:    Family History   Problem Relation Age of Onset     Breast Cancer Mother      Lung Cancer Father      Breast Cancer Maternal Grandmother      Alcoholism Brother      Alcoholism Sister      Breast Cancer Niece        Social History:  Marital Status:   [2]  Social History     Tobacco Use     Smoking status: Never     Smokeless tobacco: Never   Substance Use Topics     Alcohol use: Yes     Drug use: No        Medications:    EPINEPHrine (ANY BX GENERIC EQUIV) 0.3 MG/0.3ML injection 2-pack  fluocinonide (LIDEX) 0.05 % external gel  lisinopril (ZESTRIL) 5 MG tablet  montelukast (SINGULAIR) 10 MG tablet  Multiple Vitamins-Minerals (MULTIVITAMIN ADULT PO)  rosuvastatin (CRESTOR) 5 MG tablet  tazarotene (TAZORAC) 0.05 % external gel          Review of Systems   Constitutional: Negative for chills and fever.   Respiratory: Negative for cough and shortness of breath.    All other systems reviewed and are negative.      Physical Exam   BP: (!) 185/95  Pulse: 96  Temp: 97.7  F (36.5  C)  Resp: 18  Weight: 72.6 kg (160 lb)  SpO2: 98 %      Physical Exam  Constitutional:       General: She is not in acute distress.     Appearance: She is not diaphoretic.   HENT:      Head: Normocephalic and atraumatic.      Right Ear: External ear normal.      Left Ear: External ear normal.      Nose: No congestion or rhinorrhea.      Mouth/Throat:      Pharynx: Oropharynx is clear. No oropharyngeal exudate.   Eyes:      General: No scleral icterus.     Pupils: Pupils are equal, round, and reactive to light.   Cardiovascular:      Rate and Rhythm: Normal rate and regular rhythm.      Heart sounds: Normal heart sounds.   Pulmonary:      Effort: No respiratory distress.      Breath sounds: Normal breath sounds.   Abdominal:      General: Bowel sounds are  normal.      Palpations: Abdomen is soft.      Tenderness: There is no abdominal tenderness.   Musculoskeletal:         General: No tenderness.      Cervical back: Normal range of motion and neck supple.      Right lower leg: No edema.      Left lower leg: No edema.   Skin:     General: Skin is warm.      Capillary Refill: Capillary refill takes less than 2 seconds.      Findings: No rash.   Neurological:      Cranial Nerves: No cranial nerve deficit.      Comments: 5 out of 5 strength to upper extremities with exception of right upper extremity, right arm goes up [facility], unable to , unable to participate in finger-nose with right upper extremity.   Psychiatric:         Mood and Affect: Mood normal.         Behavior: Behavior normal.     National [employer] Stroke Scale  Exam Interval: Baseline   Score    Level of consciousness: (0)   Alert, keenly responsive    LOC questions: (0)   Answers both questions correctly    LOC commands: (0)   Performs both tasks correctly    Best gaze: (0)   Normal    Visual: (0)   No visual loss    Facial palsy: (1)   Minor paralysis (flat nasolabial fold, smile asymmetry)    Motor arm (left): (0)   No drift    Motor arm (right): (2)   Some effort against gravity    Motor leg (left): (0)   No drift    Motor leg (right): (0)   No drift    Limb ataxia: (0)   Absent    Sensory: (1)   Mild to moderate sensory loss    Best language: (0)   Normal- no aphasia    Dysarthria: (0)   Normal    Extinction and inattention: (0)   No abnormality        Total Score:  4         ED Course              ED Course as of 03/15/23 0909   Wed Mar 15, 2023   0726 Signout received.  63 female tier 1 stroke underway last known normal 6 AM, last opportunity to give tenecteplase will be 10:30 AM.  Right-sided deficits primarily right upper extremity.  Stroke neurology already speaking with patient.   0906 Patient evaluated, there is right upper extremity weakness loss of sensation in the right upper  extremity subtle right-sided facial droop.  Otherwise no other focal neurological deficits noted.  Fairly significant findings the patient is overall young reasonable to give TNK.  Stroke neurology would also like to push TNK at this time.  I think that is reasonable.  TNK given.  She is on a nicardipine drip and received 10 mg of IV labetalol to get her blood pressure in acceptable range.   0907 Prior to giving TNK we did verify there were no brain bleeds.  No actionable findings on CTA     Procedures              Results for orders placed or performed during the hospital encounter of 03/15/23 (from the past 24 hour(s))   Glucose by meter   Result Value Ref Range    GLUCOSE BY METER POCT 81 70 - 99 mg/dL   CBC with Platelets & Differential    Narrative    The following orders were created for panel order CBC with Platelets & Differential.  Procedure                               Abnormality         Status                     ---------                               -----------         ------                     CBC with platelets and d...[[id number]]  Abnormal            Final result                 Please view results for these tests on the individual orders.   Basic metabolic panel   Result Value Ref Range    Sodium 139 136 - 145 mmol/L    Potassium 4.2 3.4 - 5.3 mmol/L    Chloride 102 98 - 107 mmol/L    Carbon Dioxide (CO2) 26 22 - 29 mmol/L    Anion Gap 11 7 - 15 mmol/L    Urea Nitrogen 11.9 8.0 - 23.0 mg/dL    Creatinine 0.63 0.51 - 0.95 mg/dL    Calcium 9.9 8.8 - 10.2 mg/dL    Glucose 84 70 - 99 mg/dL    GFR Estimate >90 >60 mL/min/1.73m2   INR   Result Value Ref Range    INR 0.97 0.85 - 1.15   Partial thromboplastin time   Result Value Ref Range    aPTT 29 22 - 38 Seconds   Troponin T, High Sensitivity   Result Value Ref Range    Troponin T, High Sensitivity 8 <=14 ng/L   CBC with platelets and differential   Result Value Ref Range    WBC Count 5.3 4.0 - 11.0 10e3/uL    RBC Count 4.01 3.80 - 5.20 10e6/uL     Hemoglobin 14.2 11.7 - 15.7 g/dL    Hematocrit 41.0 35.0 - 47.0 %     (H) 78 - 100 fL    MCH 35.4 (H) 26.5 - 33.0 pg    MCHC 34.6 31.5 - 36.5 g/dL    RDW 12.2 10.0 - 15.0 %    Platelet Count 148 (L) 150 - 450 10e3/uL    % Neutrophils 48 %    % Lymphocytes 40 %    % Monocytes 9 %    % Eosinophils 2 %    % Basophils 1 %    % Immature Granulocytes 0 %    NRBCs per 100 WBC 0 <1 /100    Absolute Neutrophils 2.5 1.6 - 8.3 10e3/uL    Absolute Lymphocytes 2.2 0.8 - 5.3 10e3/uL    Absolute Monocytes 0.5 0.0 - 1.3 10e3/uL    Absolute Eosinophils 0.1 0.0 - 0.7 10e3/uL    Absolute Basophils 0.1 0.0 - 0.2 10e3/uL    Absolute Immature Granulocytes 0.0 <=0.4 10e3/uL    Absolute NRBCs 0.0 10e3/uL   Extra Tube (Flatgap Draw)    Narrative    The following orders were created for panel order Extra Tube (Flatgap Draw).  Procedure                               Abnormality         Status                     ---------                               -----------         ------                     Extra Heparinized Syringe[[id number]]                        Final result                 Please view results for these tests on the individual orders.   Extra Heparinized Syringe   Result Value Ref Range    Hold Specimen [facility]    CT Head w/o Contrast    Narrative    EXAM: CT HEAD W/O CONTRAST 3/15/2023 7:04 AM    PROVIDED HISTORY: Code Stroke to evaluate for potential thrombolysis  and thrombectomy. PLEASE READ IMMEDIATELY.    COMPARISON: None    TECHNIQUE:   Imaging protocol: Multiplanar CT images of the head without  intravenous contrast.   Acquisition: This CT exam was performed using one or more the  following dose reduction techniques: automated exposure control,  adjustment of the mA and/or kV according to patient size, and/or  iterative reconstruction technique.    FINDINGS:  No intracranial hemorrhage, mass effect, or midline shift. The  ventricles are proportionate to the cerebral sulci. Scattered patchy  foci of hypodensity in the  periventricular and supraventricular white  matter, which is nonspecific, likely related to chronic small vessel  ischemic disease given the patient's age. Mild general parenchymal  volume loss. No acute loss of gray-white matter differentiation.    No acute osseous abnormality. No paranasal sinus mucosal thickening.  Mastoid air cells are clear. The orbits are grossly unremarkable.       Impression    IMPRESSION:  1. No acute intracranial hemorrhage  2. ASPECT SCORE 10/10.    MARIELLE NUNO MD         SYSTEM ID:  [id number]   CTA Head Neck with Contrast    Narrative    EXAM: CTA HEAD NECK W CONTRAST, 3/15/2023 7:15 AM     HISTORY: Code Stroke to evaluate for potential thrombolysis and  thrombectomy. PLEASE READ IMMEDIATELY.    TECHNIQUE:   Imaging protocol:  CTA Head and Neck: Following the administration of intravenous  contrast, thin helical CT angiography images of the brain were  obtained.  Postcontrast helical thin CT angiography images of the neck  were obtained.  NASCET criteria were applied. Source, multiplanar and  MIP reformatted images were reviewed.    Meds given: ISOVUE 370  45mL.  Acquisition: This CT exam was performed using one or more the  following dose reduction techniques: automated exposure control,  adjustment of the mA and/or kV according to patient size, and/or  iterative reconstruction technique.  Processing: 3D rendering on independent workstation using Maximum  Intensity Projection (MIP) was performed and archived to PACS. 3D  reconstructions are interpreted and reported by supervising  radiologist.    COMPARISON: CT head 3/15/2023    FINDINGS:    CTA Brain:    Head CTA demonstrates no aneurysm or vascular malformation. The  petrous, cavernous, and supraclinoid internal carotid arteries are  within normal limits.    The A1 anterior cerebral arteries are patent. The anterior  communicating artery is within normal limits. The distal anterior  cerebral arteries are within normal limits. The left  and right middle  cerebral arteries are patent and demonstrate no significant focal  stenosis.     Fetal origin of the right posterior cerebral artery. The left  posterior communicating artery is patent.    The basilar and right vertebral arteries are within normal limits.  Hypoplastic left V4 vertebral artery which continues predominantly as  the left PICA. Hypoplastic V4 segment distal to the PICA origin with  poor intraluminal filling. The PCA and SCA branches demonstrate no  focal abnormalities.    CTA Neck:    A 3 vessel aortic arch is present. The origins of the great vessels  from the aortic arch are patent. Visualized aorta is nondilated and  demonstrates no acute abnormality.    The common carotid arteries demonstrate preserved caliber. The carotid  bulbs demonstrate no measurable stenosis. The distal bilateral  internal carotid arteries demonstrate no evidence of flow-limiting  stenosis or occlusion.    The right vertebral artery is dominant. There is no evidence of  flow-limiting stenosis or occlusion of the vertebral arteries.    No mass or pneumothorax is seen at the apices. Multilevel degenerative  changes are seen in the cervical spine.      Impression    IMPRESSION:   1.  Patent major intracranial arteries with no evidence of acute large  vessel occlusion, aneurysm, or vascular malformation. Poor  intraluminal filling of a hypoplastic distal V4 vertebral artery,  favored congenital.  2.  Patent major cervical arteries with no evidence of dissection or  occlusion.    [Result: No definite acute large vessel occlusion, hypoplastic left  distal V4 vertebral artery]    Dr. Murillo was contacted by Dr. Umaña at 3/15/2023 7:59 AM and  verbalized understanding of the findings.     AMRIELLE UMAÑA MD         SYSTEM ID:  [id number]   Asymptomatic COVID-19 Virus (Coronavirus) by PCR Nasopharyngeal    Specimen: Nasopharyngeal; Swab   Result Value Ref Range    SARS CoV2 PCR Negative Negative    Narrative    Testing was  performed using the Xpert Xpress SARS-CoV-2 Assay on the Cepheid Gene-Xpert Instrument Systems. Additional information about this Emergency Use Authorization (EUA) assay can be found via the Lab Guide. This test should be ordered for the detection of SARS-CoV-2 in individuals who meet SARS-CoV-2 clinical and/or epidemiological criteria as well as from individuals without symptoms or other reasons to suspect COVID-19. Test performance for asymptomatic patients has only been established in anterior nasal swab specimens. This test is for in vitro diagnostic use under the FDA EUA for laboratories certified under CLIA to perform high complexity testing. This test has not been FDA cleared or approved. A negative result does not rule out the presence of PCR inhibitors in the specimen or target RNA concentration below the limit of detection for the assay. The possibility of a false negative should be considered if the patient's recent exposure or clinical presentation suggests COVID-19. This test was validated by [facility] laboratory. This laboratory is certified under the Clinical Laboratory Improvement Amendments (CLIA) as qualified to perform high complexity testing.       Medications   sodium chloride 0.9% infusion (has no administration in time range)   labetalol (NORMODYNE/TRANDATE) syringe 10 mg (10 mg Intravenous $Given 3/15/23 0742)     Or   hydrALAZINE (APRESOLINE) injection 10 mg ( Intravenous See Alternative 3/15/23 0742)   niCARdipine 40 mg in 200 mL NS (CARDENE) infusion (0 mg/hr Intravenous ED Infusing on Admission/transfer 3/15/23 0904)   sodium chloride (PF) 0.9% PF flush 100 mL (100 mLs Intravenous $Given 3/15/23 0708)   iopamidol (ISOVUE-370) solution 45 mL (45 mLs Intravenous $Given 3/15/23 0709)   tenecteplase (TNKase) injection 19 mg (19 mg Intravenous $Given 3/15/23 0752)       Assessments & Plan (with Medical Decision Making)     I have reviewed the nursing notes.    I have  reviewed the findings, diagnosis, plan and need for follow up with the patient.    Initial assessment and plan is to initiate tier 1 stroke activation.  I discussed case with stroke neurology who agrees with stroke work-up.  They will do video conference with patient.  At this time she is in the scanner getting her CT and CTA head neck.  Case will be signed out to Dr. Murillo who will finish work-up.    New Prescriptions    No medications on file       Final diagnoses:   Cerebrovascular accident (CVA), unspecified mechanism (H)       3/15/2023   [facility]     Maynor Murillo MD  03/15/23 0909

## 2023-03-15 NOTE — ED NOTES
Patient states she woke up around 0430 and took a shower and was doing fine. States she was in the kitchen around 0600 and developed right arm numbness. She arrives here ambulatory. Denies slurred speech. Denies symptoms with other extremities except for right arm. Able to raise both arms, but struggles with raising right arm.  are strong on left, absent on right.  Assessed by provider and then to CT.

## 2023-03-15 NOTE — ED NOTES
STROKE CODE ARRIVAL NOTE  63F that presents to triage 0640 private car  With history of right arm numbness reported by patient   Last known well 0600  Tier Stroke Code activated   Point of Care glucose 81  See Neurological narrator for initial and ongoing 15 minute neurological checks

## 2023-05-12 ENCOUNTER — APPOINTMENT (OUTPATIENT)
Dept: CT IMAGING | Facility: HOSPITAL | Age: 63
End: 2023-05-12
Attending: EMERGENCY MEDICINE
Payer: COMMERCIAL

## 2023-05-12 ENCOUNTER — HOSPITAL ENCOUNTER (EMERGENCY)
Facility: HOSPITAL | Age: 63
Discharge: ANOTHER HOSPITAL | End: 2023-05-12
Attending: EMERGENCY MEDICINE | Admitting: EMERGENCY MEDICINE
Payer: COMMERCIAL

## 2023-05-12 ENCOUNTER — APPOINTMENT (OUTPATIENT)
Dept: MRI IMAGING | Facility: HOSPITAL | Age: 63
End: 2023-05-12
Attending: EMERGENCY MEDICINE
Payer: COMMERCIAL

## 2023-05-12 VITALS
SYSTOLIC BLOOD PRESSURE: 151 MMHG | DIASTOLIC BLOOD PRESSURE: 95 MMHG | BODY MASS INDEX: 25.52 KG/M2 | WEIGHT: 162.6 LBS | RESPIRATION RATE: 16 BRPM | TEMPERATURE: 98.4 F | HEIGHT: 67 IN | OXYGEN SATURATION: 97 % | HEART RATE: 80 BPM

## 2023-05-12 DIAGNOSIS — I63.9 CEREBROVASCULAR ACCIDENT (CVA), UNSPECIFIED MECHANISM (H): ICD-10-CM

## 2023-05-12 LAB
ANION GAP SERPL CALCULATED.3IONS-SCNC: 10 MMOL/L (ref 7–15)
APTT PPP: 28 SECONDS (ref 22–38)
BASOPHILS # BLD AUTO: 0.1 10E3/UL (ref 0–0.2)
BASOPHILS NFR BLD AUTO: 1 %
BUN SERPL-MCNC: 15.4 MG/DL (ref 8–23)
CALCIUM SERPL-MCNC: 9.8 MG/DL (ref 8.8–10.2)
CHLORIDE SERPL-SCNC: 96 MMOL/L (ref 98–107)
CREAT SERPL-MCNC: 0.75 MG/DL (ref 0.51–0.95)
DEPRECATED HCO3 PLAS-SCNC: 29 MMOL/L (ref 22–29)
EOSINOPHIL # BLD AUTO: 0.1 10E3/UL (ref 0–0.7)
EOSINOPHIL NFR BLD AUTO: 1 %
ERYTHROCYTE [DISTWIDTH] IN BLOOD BY AUTOMATED COUNT: 12 % (ref 10–15)
GFR SERPL CREATININE-BSD FRML MDRD: 90 ML/MIN/1.73M2
GLUCOSE BLDC GLUCOMTR-MCNC: 104 MG/DL (ref 70–99)
GLUCOSE SERPL-MCNC: 113 MG/DL (ref 70–99)
HCT VFR BLD AUTO: 40.2 % (ref 35–47)
HGB BLD-MCNC: 14.1 G/DL (ref 11.7–15.7)
HOLD SPECIMEN: NORMAL
HOLD SPECIMEN: NORMAL
IMM GRANULOCYTES # BLD: 0 10E3/UL
IMM GRANULOCYTES NFR BLD: 0 %
INR PPP: 0.98 (ref 0.85–1.15)
LYMPHOCYTES # BLD AUTO: 1.9 10E3/UL (ref 0.8–5.3)
LYMPHOCYTES NFR BLD AUTO: 31 %
MCH RBC QN AUTO: 34.7 PG (ref 26.5–33)
MCHC RBC AUTO-ENTMCNC: 35.1 G/DL (ref 31.5–36.5)
MCV RBC AUTO: 99 FL (ref 78–100)
MONOCYTES # BLD AUTO: 0.5 10E3/UL (ref 0–1.3)
MONOCYTES NFR BLD AUTO: 9 %
NEUTROPHILS # BLD AUTO: 3.5 10E3/UL (ref 1.6–8.3)
NEUTROPHILS NFR BLD AUTO: 58 %
NRBC # BLD AUTO: 0 10E3/UL
NRBC BLD AUTO-RTO: 0 /100
PLATELET # BLD AUTO: 163 10E3/UL (ref 150–450)
POTASSIUM SERPL-SCNC: 4.6 MMOL/L (ref 3.4–5.3)
RBC # BLD AUTO: 4.06 10E6/UL (ref 3.8–5.2)
SODIUM SERPL-SCNC: 135 MMOL/L (ref 136–145)
TROPONIN T SERPL HS-MCNC: <6 NG/L
WBC # BLD AUTO: 6 10E3/UL (ref 4–11)

## 2023-05-12 PROCEDURE — 36415 COLL VENOUS BLD VENIPUNCTURE: CPT | Performed by: EMERGENCY MEDICINE

## 2023-05-12 PROCEDURE — 250N000013 HC RX MED GY IP 250 OP 250 PS 637: Performed by: EMERGENCY MEDICINE

## 2023-05-12 PROCEDURE — A9585 GADOBUTROL INJECTION: HCPCS | Performed by: RADIOLOGY

## 2023-05-12 PROCEDURE — 93010 ELECTROCARDIOGRAM REPORT: CPT | Performed by: INTERNAL MEDICINE

## 2023-05-12 PROCEDURE — 82962 GLUCOSE BLOOD TEST: CPT

## 2023-05-12 PROCEDURE — 70450 CT HEAD/BRAIN W/O DYE: CPT | Mod: XS

## 2023-05-12 PROCEDURE — 70496 CT ANGIOGRAPHY HEAD: CPT

## 2023-05-12 PROCEDURE — 99285 EMERGENCY DEPT VISIT HI MDM: CPT | Performed by: EMERGENCY MEDICINE

## 2023-05-12 PROCEDURE — 93005 ELECTROCARDIOGRAM TRACING: CPT

## 2023-05-12 PROCEDURE — 99291 CRITICAL CARE FIRST HOUR: CPT | Mod: 25

## 2023-05-12 PROCEDURE — 255N000002 HC RX 255 OP 636: Performed by: RADIOLOGY

## 2023-05-12 PROCEDURE — 70498 CT ANGIOGRAPHY NECK: CPT

## 2023-05-12 PROCEDURE — 85610 PROTHROMBIN TIME: CPT | Performed by: EMERGENCY MEDICINE

## 2023-05-12 PROCEDURE — 85730 THROMBOPLASTIN TIME PARTIAL: CPT | Performed by: EMERGENCY MEDICINE

## 2023-05-12 PROCEDURE — 85025 COMPLETE CBC W/AUTO DIFF WBC: CPT | Performed by: EMERGENCY MEDICINE

## 2023-05-12 PROCEDURE — 84484 ASSAY OF TROPONIN QUANT: CPT | Performed by: EMERGENCY MEDICINE

## 2023-05-12 PROCEDURE — 0042T CT HEAD PERFUSION W CONTRAST: CPT

## 2023-05-12 PROCEDURE — 250N000011 HC RX IP 250 OP 636: Performed by: RADIOLOGY

## 2023-05-12 PROCEDURE — 80048 BASIC METABOLIC PNL TOTAL CA: CPT | Performed by: EMERGENCY MEDICINE

## 2023-05-12 PROCEDURE — 70553 MRI BRAIN STEM W/O & W/DYE: CPT

## 2023-05-12 RX ORDER — CLOPIDOGREL 300 MG/1
300 TABLET, FILM COATED ORAL ONCE
Status: COMPLETED | OUTPATIENT
Start: 2023-05-12 | End: 2023-05-12

## 2023-05-12 RX ORDER — ASPIRIN 325 MG
325 TABLET ORAL ONCE
Status: COMPLETED | OUTPATIENT
Start: 2023-05-12 | End: 2023-05-12

## 2023-05-12 RX ORDER — IOPAMIDOL 755 MG/ML
100 INJECTION, SOLUTION INTRAVASCULAR ONCE
Status: COMPLETED | OUTPATIENT
Start: 2023-05-12 | End: 2023-05-12

## 2023-05-12 RX ORDER — GADOBUTROL 604.72 MG/ML
7.5 INJECTION INTRAVENOUS ONCE
Status: COMPLETED | OUTPATIENT
Start: 2023-05-12 | End: 2023-05-12

## 2023-05-12 RX ADMIN — ASPIRIN 325 MG ORAL TABLET 325 MG: 325 PILL ORAL at 14:53

## 2023-05-12 RX ADMIN — IOPAMIDOL 100 ML: 755 INJECTION, SOLUTION INTRAVENOUS at 12:14

## 2023-05-12 RX ADMIN — GADOBUTROL 7.5 ML: 604.72 INJECTION INTRAVENOUS at 13:49

## 2023-05-12 RX ADMIN — CLOPIDOGREL BISULFATE 300 MG: 300 TABLET, FILM COATED ORAL at 14:53

## 2023-05-12 ASSESSMENT — ENCOUNTER SYMPTOMS
CONSTITUTIONAL NEGATIVE: 1
CARDIOVASCULAR NEGATIVE: 1
ENDOCRINE NEGATIVE: 1
EYES NEGATIVE: 1
HEADACHES: 1
WEAKNESS: 1
MUSCULOSKELETAL NEGATIVE: 1
RESPIRATORY NEGATIVE: 1
GASTROINTESTINAL NEGATIVE: 1

## 2023-05-12 ASSESSMENT — ACTIVITIES OF DAILY LIVING (ADL)
ADLS_ACUITY_SCORE: 35

## 2023-05-12 NOTE — ED TRIAGE NOTES
Pt presents with reports having stroke march 15.today  woke with headache gone now. Right arm feeling heavy.  Feels like stroke is coming on again, similar feelings as last one. Onset was a few hours ago.  Has been feeling off and dizzy today.

## 2023-05-12 NOTE — CONSULTS
[facility]    Stroke Telephone Note    I was called by Andrea Cherry on 05/12/23 regarding patient Jeannie Phelps. The patient is a 63 year old female with PMH of HTN, alcoholism an discoid lupus erythematosus.  In addition, she was recently evaluated for dizziness and R extremity numbness/weakness on 3/15/23; she was treated with TNK and transferred to [place] where follow up MRI was negative, diagnosed as aborted infarct and she was discharged on ASA and Lipitor.    Today, she presents to the ED for evaluation of weakness. LKW was bedtime last night (2200). She awoke around 0700 and noticed RUE weakness/heaviness and feeling of clumsiness and headache. She notes symptoms feel somewhat similar to those she had in March. Headache has resolved. Per ED provider on exam she may have subtle reduced R  strength compared to left but no drift or clear muscle weakness, no other deficits. Presenting /88.     Stroke Code Data (for stroke code without tele)  Stroke code activated 05/12/23   1153   Stroke provider first response  05/12/23   1200            Last known normal 05/11/23   2200        Time of discovery   (or onset of symptoms) 05/12/23   0700   Head CT read by Stroke Neuro Dr/Provider 05/12/23   1214   Was stroke code de-escalated? Yes 05/12/23 1222          Imaging Findings   CT head: no hemorrhage or other acute findings  CTA head/neck: No LVO, significant stenosis or dissection   CT perfusion: normal perfusion     Intravenous Thrombolysis  Not given due to:   - minor/isolated/quickly resolving symptoms  - unclear or unfavorable risk-benefit profile for extended window thrombolysis beyond the conventional 4.5 hour time window    Endovascular Treatment  Not initiated due to absence of proximal vessel occlusion    Impression  Headache, RUE weakness/clumsiness. Etiology unclear; given second presentation of similar symptoms in 2 months in absence of focal stenosis, TIA/stroke  "seems less likely. Consider alternative etiology such as amyloid spell vs migraine with aura vs seizure vs other     Recommendations   -brain MRI with and without contrast; please page stroke neurology when imaging is completed for further recommendations      My recommendations are based on the information provided over the phone by Jeannie Phelps's in-person providers. They are not intended to replace the clinical judgment of her in-person providers. I was not requested to personally see or examine the patient at this time.    Griselda GALLAGHER, CNP  Vascular Neurology  To page me or covering stroke neurology team member, click here: AMCOM   Choose \"On Call\" tab at top, then search dropdown box for \"Neurology Adult\", select location, press Enter, then look for stroke/neuro ICU/telestroke.    "

## 2023-05-12 NOTE — ED NOTES
1153 Teir 1 code stroke  1155 162.6 lbs  1156 104 BG  1159 down to CT  1216 Radiologist called spoke with ER    1221 De esclated Stroke  1222 Left CT  1225 Care team updated

## 2023-05-12 NOTE — ED NOTES
STROKE CODE ARRIVAL NOTE  63F that presents to triage private car  With history of  unresponsive reported by spouse/SO  Last known well 0800 today or 2200 last night.   Tier 1 to 2 Stroke Code activated   Point of Care glucose 104  See Neurological narrator for initial and ongoing 15 minute neurological checks

## 2023-05-12 NOTE — PROGRESS NOTES
" Brief Stroke Note:  MRI with evidence of acute appearing L corona radiata infarct; this would correlate with patient's initial symptoms of RUE weakness. Will recommend admission for stroke evaluation.    Impression:  Acute L corona radiata infarct    Recommendations:  - Use orderset: \"Ischemic Stroke Routine Admission\" or \"Ischemic Stroke No Thrombolytics/No Thrombectomy ICU Admission\"  - Neurochecks and Vital Signs every 4 hours   - Permissive HTN; goal SBP < 220 mmHg  - Plavix (clopidogrel) 300 mg PO loading dose x 1, followed by 75mg daily starting 5/13  - ASA 325mg PO loading dose x 1, followed by 81mg daily starting 5/13  - TTE (with Bubble Study if age 60 yrs or less)  - Telemetry, EKG  - Bedside Glucose Monitoring  - A1c, Lipid Panel, Troponin x 3  - PT/OT/SLP  - Stroke Education  - Euthermia, Euglycemia  - please place stroke neurology consult order     Griselda GALLAGHER, CNP  Vascular Neurology  To page me or covering stroke neurology team member, click here: AMCOM   Choose \"On Call\" tab at top, then search dropdown box for \"Neurology Adult\", select location, press Enter, then look for stroke/neuro ICU/telestroke.        "

## 2023-05-12 NOTE — ED PROVIDER NOTES
"  History     Chief Complaint   Patient presents with     Stroke Symptoms     HPI  Jeannie Phelps is a 63 year old female who was to the emergency department complaining of heaviness in her right arm.  Patient states that she felt well when she went to bed before 10:00 last night.  She woke up at 7:00 this morning and had a headache.  She also is experiencing \"heaviness\" in her right arm.  The patient states that she had a stroke in March.  She was given TNKase and transferred to [place].  Her  relates that the patient seem to be having some trouble with word finding when she woke up this morning as well.  Now her speech is clear.  She states her headache is resolved.  She is not having chest pain or shortness of breath.  She denies pain numbness tingling or weakness in her lower extremities.  Patient is made a stroke activation.    Allergies:  Allergies   Allergen Reactions     Rofecoxib Other (See Comments)     Pt had problems with bleeding with urination, Verified in Meditech: Y, Severity in Meditech: S, Vioxx TABS     Hydrocodone Itching     Nerve sensation      Influenza Vaccines      pt's heart races., Verified in Meditech: Y, Severity in Meditech: S     Metoprolol Other (See Comments)     Hot flashes     Amlodipine Other (See Comments) and Palpitations     Hot flashes     Cefuroxime Rash     Cefuroxime Axetil TABS - Rash     Lisinopril Other (See Comments) and Palpitations       Problem List:    Patient Active Problem List    Diagnosis Date Noted     Hypertension      Priority: Medium     Alcoholism (H)      Priority: Medium     Bunion 10/12/2017     Priority: Medium     Overview:   Added automatically from request for surgery [id number]       Essential hypertension 03/14/2016     Priority: Medium     High risk medication use 07/28/2014     Priority: Medium     Dermatitis due to sun 09/25/2013     Priority: Medium     Symptomatic menopausal or female climacteric states 08/29/2013     Priority: Medium " "    Lyme disease 2013     Priority: Medium     Overview:   Lyme Disease-Deer tick bite 13.  Initially noted a red target lesion on   right lower back       Discoid lupus erythematosus 2008     Priority: Medium        Past Medical History:    Past Medical History:   Diagnosis Date     Alcoholism (H)      Hypertension        Past Surgical History:    Past Surgical History:   Procedure Laterality Date      SECTION      3x     CHOLECYSTECTOMY       HYSTERECTOMY VAGINAL      Cervix status unknown. Bilateral ovaries remain; Performed for abnormal uterine bleeding. Per [facility] Care Everywhere Summary.      MANDIBLE SURGERY         Family History:    Family History   Problem Relation Age of Onset     Breast Cancer Mother      Lung Cancer Father      Breast Cancer Maternal Grandmother      Alcoholism Brother      Alcoholism Sister      Breast Cancer Niece        Social History:  Marital Status:   [2]  Social History     Tobacco Use     Smoking status: Never     Smokeless tobacco: Never   Substance Use Topics     Alcohol use: Yes     Drug use: No        Medications:    EPINEPHrine (ANY BX GENERIC EQUIV) 0.3 MG/0.3ML injection 2-pack  fluocinonide (LIDEX) 0.05 % external gel  lisinopril (ZESTRIL) 5 MG tablet  montelukast (SINGULAIR) 10 MG tablet  Multiple Vitamins-Minerals (MULTIVITAMIN ADULT PO)  rosuvastatin (CRESTOR) 5 MG tablet  tazarotene (TAZORAC) 0.05 % external gel          Review of Systems   Constitutional: Negative.    HENT: Negative.    Eyes: Negative.    Respiratory: Negative.    Cardiovascular: Negative.    Gastrointestinal: Negative.    Endocrine: Negative.    Genitourinary: Negative.    Musculoskeletal: Negative.    Neurological: Positive for weakness and headaches.   All other systems reviewed and found unremarkable    Physical Exam   BP: 161/88  Pulse: 93  Temp: 97.7  F (36.5  C)  Resp: 16  Height: 170.2 cm (5' 7\")  Weight: 73.8 kg (162 lb 9.6 oz)  SpO2: 99 " "%      Physical Exam 63-year-old female who is awake alert oriented person place and time pleasant and cooperative with my exam.  HEENT normocephalic extraocular muscles intact and pupils equally round and reactive to light.  Tongue midline palate intact oropharynx clear.  No facial asymmetry is noted.  Neck is supple is full range of motion without pain.  There is no JVD.  Lungs are clear bilaterally.  Heart maintains a regular rate and rhythm S1 and S2 sounds are appreciated.  Abdomen is soft is nontender no mass no organomegaly rebound.  Extremities a full range of motion brisk peripheral pulses brisk capillary refill no sensory deficit.  Patient may have very slightly diminished  strength on the right.  Neurologic exam there is no focal cranial nerve deficit.  As I commented the patient may have slightly diminished  strength on the right no incoordination.  No pronator drift.  Dermatologic exam no diffuse skin rashes or lesions.    ED Course              ED Course as of 05/12/23 1517   Fri May 12, 2023   1216 Patient made a stroke activation.  I was contacted immediately by [facility] stroke neurology.  Received a call from Dr. Cuello from radiology at 1215.  He relates that initial head CT is read as \"no acute hemorrhage\"   1252 Reassessed the patient.  She relates that she feels about the same.  Stroke code is de-escalated.  Will order an MRI.   1504 Reassessed the patient to discuss MRI findings.  Patient states she felt completely back to normal.  I discussed the case with the stroke neurology at [facility] and also with Dr. Guerrero on-call hospitalist who very graciously agreed to admit the patient to his service.  She will be transferred by ambulance              {EKG done and interpreted by myself.  It shows a normal sinus rhythm.  Ventricular rate 82 bpm.  WI interval is 146 ms.  Corrected QT is 490 ms.  There is no ST segment elevation or depression.  No inappropriate T wave " inversion.  No evidence of acute ischemia.               Results for orders placed or performed during the hospital encounter of 05/12/23 (from the past 24 hour(s))   Glucose by meter   Result Value Ref Range    GLUCOSE BY METER POCT 104 (H) 70 - 99 mg/dL   CBC with Platelets & Differential    Narrative    The following orders were created for panel order CBC with Platelets & Differential.  Procedure                               Abnormality         Status                     ---------                               -----------         ------                     CBC with platelets and d...[[id number]]  Abnormal            Final result                 Please view results for these tests on the individual orders.   Basic metabolic panel   Result Value Ref Range    Sodium 135 (L) 136 - 145 mmol/L    Potassium 4.6 3.4 - 5.3 mmol/L    Chloride 96 (L) 98 - 107 mmol/L    Carbon Dioxide (CO2) 29 22 - 29 mmol/L    Anion Gap 10 7 - 15 mmol/L    Urea Nitrogen 15.4 8.0 - 23.0 mg/dL    Creatinine 0.75 0.51 - 0.95 mg/dL    Calcium 9.8 8.8 - 10.2 mg/dL    Glucose 113 (H) 70 - 99 mg/dL    GFR Estimate 90 >60 mL/min/1.73m2   INR   Result Value Ref Range    INR 0.98 0.85 - 1.15   Partial thromboplastin time   Result Value Ref Range    aPTT 28 22 - 38 Seconds   Troponin T, High Sensitivity   Result Value Ref Range    Troponin T, High Sensitivity <6 <=14 ng/L   CBC with platelets and differential   Result Value Ref Range    WBC Count 6.0 4.0 - 11.0 10e3/uL    RBC Count 4.06 3.80 - 5.20 10e6/uL    Hemoglobin 14.1 11.7 - 15.7 g/dL    Hematocrit 40.2 35.0 - 47.0 %    MCV 99 78 - 100 fL    MCH 34.7 (H) 26.5 - 33.0 pg    MCHC 35.1 31.5 - 36.5 g/dL    RDW 12.0 10.0 - 15.0 %    Platelet Count 163 150 - 450 10e3/uL    % Neutrophils 58 %    % Lymphocytes 31 %    % Monocytes 9 %    % Eosinophils 1 %    % Basophils 1 %    % Immature Granulocytes 0 %    NRBCs per 100 WBC 0 <1 /100    Absolute Neutrophils 3.5 1.6 - 8.3 10e3/uL    Absolute Lymphocytes  1.9 0.8 - 5.3 10e3/uL    Absolute Monocytes 0.5 0.0 - 1.3 10e3/uL    Absolute Eosinophils 0.1 0.0 - 0.7 10e3/uL    Absolute Basophils 0.1 0.0 - 0.2 10e3/uL    Absolute Immature Granulocytes 0.0 <=0.4 10e3/uL    Absolute NRBCs 0.0 10e3/uL   Extra Tube    Narrative    The following orders were created for panel order Extra Tube.  Procedure                               Abnormality         Status                     ---------                               -----------         ------                     Extra Red Top Tube[[id number]]                               Final result               Extra Heparinized Syringe[[id number]]                        Final result                 Please view results for these tests on the individual orders.   Extra Red Top Tube   Result Value Ref Range    Hold Specimen JIC    Extra Heparinized Syringe   Result Value Ref Range    Hold Specimen JIC    CT Head w/o Contrast    Narrative    PROCEDURE: CT HEAD W/O CONTRAST   5/12/2023 12:13 PM    HISTORY:Female, age,  63 years, , , Code Stroke to evaluate for  potential thrombolysis and thrombectomy. PLEASE READ IMMEDIATELY.    COMPARISON:No relevant prior imaging.    TECHNIQUE: CT of the brain without contrast. Axial; sagittal and  coronal reconstructed images were reviewed.    FINDINGS: Ventricles and sulci all normal. Gray and white matter  demonstrate scattered areas of decreased density.    There is no evidence of mass, mass effect or midline shift. No  evidence of acute hemorrhage.    No acute fracture.       Impression    IMPRESSION:   No acute intracranial hemorrhage.  No acute fracture.     Findings discussed with Dr. Cherry at 1215 hours 5/12/2023    This facility minimizes radiation dose by adjusting the mA and/or kV  according to each patient size.    This CT scan was performed using one or more the following dose  reduction techniques:    -Automated exposure control,  -Adjustment of the mA and/or kV according to patient's size,  and/or,  -Use of iterative reconstruction technique.      LILY GLASS MD         SYSTEM ID:  [id number]   CTA Head Neck with Contrast    Narrative    PROCEDURE: CTA HEAD NECK W CONTRAST   5/12/2023 12:29 PM    HISTORY:Female, age,  63 years, , , Code Stroke to evaluate for  potential thrombolysis and thrombectomy. PLEASE READ IMMEDIATELY.    COMPARISON: CTA head neck 3/15/2023    TECHNIQUE: CT angiograms was performed of the head and neck  before  and after the administration of intravenous contrast. Sagittal,  coronal, axial and 3-D reconstructed MIP  images were reviewed.    FINDINGS:   CTA neck: Visualized portions of the aortic arch are unremarkable. The  innominate artery, left and right subclavian arteries, left and right  common carotid arteries and the left and right vertebral arteries are  patent. 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 Carotid bifurcations are unremarkable. The external carotid  arteries in the cervical portions of the left and right internal  carotid artery are also unremarkable. There are CTA brain: Hypoplastic  left vertebral artery terminates at the left posterior inferior  cerebellar artery    CTA brain: Fetal origin of the right posterior cerebral artery. The  posterior cerebral arteries and branches are unremarkable. Petrous and  cavernous portions of the left and right internal carotid artery are  unremarkable. The anterior mesial arteries and respective branches are  also unremarkable.    Contrast-enhanced CT scan of the neck: The muscles of mastication and  salivary glands are unremarkable. There is no evidence of pathologic  lymph node enlargement. Number of normal-sized lymph nodes are again  seen throughout the neck without change compared to the prior study.  Mild enlargement of the thyroid gland is similar in appearance. Larynx  is unremarkable. Mild air trapping again seen within the visualized  portions of the lungs. Scattered degenerative  changes of the cervical  spine are also similar in appearance.    Contrast-enhanced CT scan of the brain: Ventricles and sulci are  normal in size and shape. There is no evidence of abnormal  enhancement.        Impression    IMPRESSION:   No evidence of acute vascular abnormality.    No evidence of occlusion, significant stenosis, dissection, arterial  venous malformation or apparent aneurysm.    Nonacute changes of the cervical spine and lung apices are similar in  appearance compared to prior study..    This facility minimizes radiation dose by adjusting the mA and/or kV  according to each patient size.      This CT scan was performed using one or more the following dose  reduction techniques:    -Automated exposure control,  -Adjustment of the mA and/or kV according to patient's size, and/or,  -Use of iterative reconstruction technique.      LILY GALSS MD         SYSTEM ID:  [id number]   CT Head Perfusion w Contrast - For Tier 2 Stroke    Narrative    CT HEAD PERFUSION W CONTRAST, 5/12/2023 12:30 PM    History: Female, age 63 years; Code Stroke to evaluate for potential  thrombolysis and thrombectomy. Evaluate mismatch between penumbra and  core infarct. READ IMMEDIATELY.    Comparison: CT scan of the brain 5/12/2023 and 3/15/2023    Technique: CT perfusion was performed after the administration of IV  contrast. .    FINDINGS: Cerebral blood flows within normal limits. No evidence of  mismatch. No evidence that would suggest ischemia.      Impression    IMPRESSION:   No evidence of infarction or ischemia.    LILY GLASS MD         SYSTEM ID:  [id number]   MR Brain w/o & w Contrast    Narrative    EXAM:  MR BRAIN W/O & W CONTRAST    HISTORY:  right arm weakness. .    TECHNIQUE:  Sagittal T1, axial T1, T2, FLAIR, diffusion, gradient as  well as axial and 3D postcontrast imaging of the whole brain was  performed.    MEDS/CONTRAST: Gadavist 7.5 mL    COMPARISON:  5/12/2023     FINDINGS:    Prominence of the  ventricles and sulci is compatible with mild,  generalized volume loss. No abnormal extra-axial collection,  hydrocephalus, mass effect or midline shift is seen. The basal  cisterns are preserved.    Mild chronic microvascular ischemic changes are seen in the  supratentorial white matter. Diffusion restriction is present in the  left corona radiata likely involving a portion of the corticospinal  tract. No abnormal intracranial enhancement or concerning T2* gradient  susceptibility is identified.     The T1 marrow signal is unremarkable.       Impression    IMPRESSION: Small acute infarct of the left corona radiata likely  involving a portion of the left corticospinal tract and therefore  corresponding to acute right arm weakness.    MALIKA BISWAS MD         SYSTEM ID:  [id number]       Medications   sodium chloride (PF) 0.9% PF flush 100 mL (100 mLs Intravenous $Given 5/12/23 1214)   iopamidol (ISOVUE-370) solution 100 mL (100 mLs Intravenous $Given 5/12/23 1214)   sodium chloride (PF) 0.9% PF flush 5 mL (5 mLs Intravenous $Given 5/12/23 1349)   gadobutrol (GADAVIST) injection 7.5 mL (7.5 mLs Intravenous $Given 5/12/23 1349)   clopidogrel (PLAVIX) tablet 300 mg (300 mg Oral $Given 5/12/23 1453)   aspirin (ASA) tablet 325 mg (325 mg Oral $Given 5/12/23 1453)       Assessments & Plan (with Medical Decision Making)     I have reviewed the nursing notes.        Medical Decision Making  The patient's presentation was of moderate complexity (an acute illness with systemic symptoms).    The patient's evaluation involved:  review of 3+ test result(s) ordered prior to this encounter (see separate area of note for details)    The patient's management necessitated high risk (a decision regarding hospitalization).        New Prescriptions    No medications on file       Final diagnoses:   Cerebrovascular accident (CVA), unspecified mechanism (H)       5/12/2023   [facility]     Andrea Cherry,  DO  05/12/23 1517

## 2023-06-08 ENCOUNTER — VIRTUAL VISIT (OUTPATIENT)
Dept: PULMONOLOGY | Facility: OTHER | Age: 63
End: 2023-06-08
Attending: FAMILY MEDICINE
Payer: COMMERCIAL

## 2023-06-08 VITALS
SYSTOLIC BLOOD PRESSURE: 138 MMHG | WEIGHT: 160 LBS | HEIGHT: 66 IN | DIASTOLIC BLOOD PRESSURE: 77 MMHG | BODY MASS INDEX: 25.71 KG/M2

## 2023-06-08 DIAGNOSIS — Z86.73 HISTORY OF TIA (TRANSIENT ISCHEMIC ATTACK) AND STROKE: ICD-10-CM

## 2023-06-08 DIAGNOSIS — Z82.0 FAMILY HISTORY OF SLEEP APNEA: ICD-10-CM

## 2023-06-08 DIAGNOSIS — R06.83 SNORING: Primary | ICD-10-CM

## 2023-06-08 DIAGNOSIS — I10 ESSENTIAL HYPERTENSION: ICD-10-CM

## 2023-06-08 DIAGNOSIS — R06.81 APNEA: ICD-10-CM

## 2023-06-08 PROCEDURE — 99203 OFFICE O/P NEW LOW 30 MIN: CPT | Mod: VID | Performed by: FAMILY MEDICINE

## 2023-06-08 RX ORDER — HYDROCHLOROTHIAZIDE 25 MG/1
1 TABLET ORAL DAILY
COMMUNITY
Start: 2023-05-01 | End: 2024-03-13

## 2023-06-08 RX ORDER — LISINOPRIL 40 MG/1
1 TABLET ORAL DAILY
COMMUNITY
Start: 2023-05-01 | End: 2024-03-13

## 2023-06-08 RX ORDER — CLOPIDOGREL BISULFATE 75 MG/1
TABLET ORAL
COMMUNITY
Start: 2023-06-06 | End: 2023-07-12

## 2023-06-08 RX ORDER — ATORVASTATIN CALCIUM 80 MG/1
1 TABLET, FILM COATED ORAL
COMMUNITY
Start: 2023-04-08 | End: 2023-07-12

## 2023-06-08 ASSESSMENT — PAIN SCALES - GENERAL: PAINLEVEL: NO PAIN (0)

## 2023-06-08 NOTE — NURSING NOTE
Has patient had flu shot for current/most recent flu season? If so, when? No      Is the patient currently in the state of [place]? YES    Visit mode:VIDEO    If the visit is dropped, the patient can be reconnected by: VIDEO VISIT: Text to cell phone: [phone]    Will anyone else be joining the visit? NO      How would you like to obtain your AVS? Mail a copy    Are changes needed to the allergy or medication list? NO    Reason for visit: Consult      Suze Parikh

## 2023-06-08 NOTE — PROGRESS NOTES
"Virtual Visit Details    Type of service:  Video Visit     Jeannie Phelps is a 63 year old female who is being evaluated via a billable video visit.       The patient has been notified of following:      \"This video visit will be conducted via a call between you and your physician/provider. We have found that certain health care needs can be provided without the need for an in-person physical exam.  This service lets us provide the care you need with a video conversation.  If a prescription is necessary we can send it directly to your pharmacy.  If lab work is needed we can place an order for that and you can then stop by our lab to have the test done at a later time.     Video visits are billed at different rates depending on your insurance coverage.  Please reach out to your insurance provider with any questions.     If during the course of the call the physician/provider feels a video visit is not appropriate, you will not be charged for this service.\"     Patient has given verbal consent for Video visit? Yes  How would you like to obtain your AVS? Mail a copy  If you are dropped from the video visit, the video invite should be resent to: Text to cell phone: -  Will anyone else be joining your video visit? No  If patient encounters technical issues they should call [phone]      Video-Visit Details     Type of service:  Video Visit     Start Time: 1100  End Time: 11:34 AM    Originating Location (pt. Location): Home     Distant Location (provider location):  Off-site, [facility]       Platform used for Video Visit: [employer]    Virtual visit for concern for sleep disordered breathing and need to evaluate as a modifiable stroke risk factor.     Assessment / Plan:    1.)  High pretest probability for obstructive sleep apnea with STOP-BANG score of 5  - Need to evaluate as a modifiable stroke risk factor given 2 TIAs in the past 1 month and evidence of ischemic stroke and corona " radiata, along with initially difficult to control hypertension and strong family history of sleep disordered breathing.  - Given what I feel is an urgent need for evaluation, I feel she is an appropriate candidate for home based sleep testing and we will likely treat any amount of obstructive sleep apnea observed since she also has daytime symptoms of fatigue and sleepiness and frequent sleep disruption.  - She is in agreement, orders placed for WatchPAT home sleep test.      SUBJECTIVE:  Jeannie Phelps is a 63 year old female.    Pertinent PMHx:    HTN    CVA x 2 (3/17/2023 with RUE weakness / numbness resolved with thrombolytics, 5/12/2023 for dysarthria / RUE weakness resolved spontaneously following plavix / ASA)    Reviewed discharge summaries:    3/15/2023 - 3/17/2023 - She developed acute onset RUE weakness and numbness as well as a bit of numbness in RLE. She also had some brief blurry vision. Presented to the ED where CT head was negative and CTA H&N w/o LVO. She was given labetolol and then TNK at 0752. Nicardapine drip was then started for HTN. Upon arrival to [facility], her sxs were already much improved.      She previously had issues with BP for a while with side effects from amlodipine and metoprolol. Only on lisinopril 5 mg at home. Her lisinopril dose was increased to 10 mg daily with improvement of BP.      Weakness resolved and she was stable in the ICU. Stroke Neurology was consulted. MRI Brain with no evidence of acute stroke with impression of stroke aborted by thrombolytic therapy per stroke neurology. TTE and BRETT were done with no evidence of ASD or PFO. Patient was advised to remain on aspirin and statin per stroke neurology recs with plan for outpatient MCOT monitoring and stroke neurology follow up.   5/12/2023 - 5/13/2023 - She was just admitted here about 6 weeks ago after having a stroke that was treated with TNK.   MRI subsequently did not show acute infarct.  She was started on  aspirin and statin.   She had a MCOT monitor that she wore for 20 days which did not show any evidence of atrial fibrillation.  She has palpitations and had several episodes while wearing the monitor.     She was fine until night prior to admission when she woke up with right hand clumsiness, headache and confusion.   Unlike in March, she did not have a facial droop but it sounds like she had a little expressive aphasia (just couldn't think of the right word).       She was seen in the ED in [place].   MRI by report showed acute infarct in the left corona radiata.. Received ASA and Plavix prior to transfer.     Echo 6 weeks ago fairly unremarkable other than mild LAE.     [place] back to normal by time she arrived at [facility].    Stroke workup largely done last hospitalization. Imaging did show left corona radiata infarct.     Patient was started on Plavix and discharged home with neuro stroke follow-up and referral to cardiology for loop recorder.    Today -we reviewed her recent hospitalizations for TIA and evidence of left corona radiata infarct.  She notes that she has been recommended by her family be evaluated for sleep apnea for many years due to loud regular snoring, observed apnea and strong family history.  She now is very motivated to evaluate given her recent TIAs and stroke, along with worsening daytime fatigue and increased sleep disruption with gasping arousals.    Currently she is in bed around 10 PM and falls asleep quickly.  She will have anywhere from 1-30 awakenings per night, usually she feels this is due to snoring or gasping herself awake, and falls asleep quickly.  She will awaken naturally between 6 to 6:30 AM but can be as late as 9 AM depending on when gets laid out.    Family history of obstructive sleep apnea and 2 brothers, sister, father and suspected and paternal grandfather.    We verified her current medication list and this includes:  Lisinopril 40 mg daily  Hydrochlorothiazide 25 mg  daily  Plavix 75 mg daily  Atorvastatin 80 mg daily  Low-dose aspirin    She does not report any abnormal nocturnal behaviors.      Past medical history:    Patient Active Problem List    Diagnosis Date Noted     Hypertension      Priority: Medium     Alcoholism (H)      Priority: Medium     Bunion 10/12/2017     Priority: Medium     Overview:   Added automatically from request for surgery [id number]       Essential hypertension 03/14/2016     Priority: Medium     High risk medication use 07/28/2014     Priority: Medium     Dermatitis due to sun 09/25/2013     Priority: Medium     Symptomatic menopausal or female climacteric states 08/29/2013     Priority: Medium     Lyme disease 07/08/2013     Priority: Medium     Overview:   Lyme Disease-Deer tick bite 7/5/13.  Initially noted a red target lesion on   right lower back       Discoid lupus erythematosus 09/22/2008     Priority: Medium       10 point ROS of systems including Constitutional, Eyes, Respiratory, Cardiovascular, Gastroenterology, Genitourinary, Integumentary, Muscularskeletal, Psychiatric were all negative except for pertinent positives noted in my HPI.    Current Outpatient Medications   Medication Sig Dispense Refill     EPINEPHrine (ANY BX GENERIC EQUIV) 0.3 MG/0.3ML injection 2-pack INJECT CONTENTS OF 1 PEN AS NEEDED FOR ALLERGIC REACTION       fluocinonide (LIDEX) 0.05 % external gel Apply 1 drop topically as needed       lisinopril (ZESTRIL) 5 MG tablet Take 5 mg by mouth daily       montelukast (SINGULAIR) 10 MG tablet Take 10 mg by mouth daily       Multiple Vitamins-Minerals (MULTIVITAMIN ADULT PO) Take 1 tablet by mouth daily       rosuvastatin (CRESTOR) 5 MG tablet Take 5 mg by mouth daily       tazarotene (TAZORAC) 0.05 % external gel Apply 1 drop topically as needed         OBJECTIVE:  There were no vitals taken for this visit.    Physical Exam     ---  This note was written with the assistance of the Dragon voice-dictation technology software.  The final document, although reviewed, may contain errors. For corrections, please contact the office.    Total time spent preparing to see the patient, review of chart, obtaining history and physical examination, review of sleep testing, review of treatment options, education, discussion with patient and documenting in Epic / EMR was 40 minutes.  All time involved was spent on the day of service for the patient (the same day as the patient's appointment).    Raf Marquez MD    Sleep Medicine    [place], [place]  o Main Office: [phone]    [facility] [place], [place]  o [address], [address]  o Schedule visits: [phone]  o Main Office: [phone]  o Fax: [phone]

## 2023-06-09 ENCOUNTER — VIRTUAL VISIT (OUTPATIENT)
Dept: SLEEP MEDICINE | Facility: HOSPITAL | Age: 63
End: 2023-06-09
Attending: FAMILY MEDICINE
Payer: COMMERCIAL

## 2023-06-09 DIAGNOSIS — G47.33 OSA (OBSTRUCTIVE SLEEP APNEA): Primary | ICD-10-CM

## 2023-06-09 NOTE — PROGRESS NOTES
Patient was provided both verbal and written education and instructions on use of Watch PAT device. Watch PAT device has been registered and shipped via [employer] on 6/9/2023. Patient was notified that package was mailed out. Watch PAT serial number: [id number]    Tracking # [id number].

## 2023-06-13 ENCOUNTER — DOCUMENTATION ONLY (OUTPATIENT)
Dept: SLEEP MEDICINE | Facility: HOSPITAL | Age: 63
End: 2023-06-13
Attending: FAMILY MEDICINE
Payer: COMMERCIAL

## 2023-06-13 DIAGNOSIS — Z86.73 HISTORY OF TIA (TRANSIENT ISCHEMIC ATTACK) AND STROKE: ICD-10-CM

## 2023-06-13 DIAGNOSIS — R06.83 SNORING: ICD-10-CM

## 2023-06-13 DIAGNOSIS — I10 ESSENTIAL HYPERTENSION: ICD-10-CM

## 2023-06-13 DIAGNOSIS — R06.81 APNEA: ICD-10-CM

## 2023-06-13 DIAGNOSIS — Z82.0 FAMILY HISTORY OF SLEEP APNEA: ICD-10-CM

## 2023-06-13 PROCEDURE — 95800 SLP STDY UNATTENDED: CPT | Mod: 26 | Performed by: FAMILY MEDICINE

## 2023-06-13 PROCEDURE — 95800 SLP STDY UNATTENDED: CPT

## 2023-06-13 NOTE — PROCEDURES
"WatchPAT - HOME SLEEP STUDY INTERPRETATION    Patient: Jeannie Phelps  MRN: [id number]  YOB: 1960  Study Date: 6/12/2023  Referring Provider: Thuy Washington  Ordering Provider: Raf Marquez MD, MD    Chain of custody patient verification was not enabled.       Indications for Home Study: Jeannie Phelps is a 63 year old female with a history of HTN, CVA x 2 (3/17/2023 with RUE weakness / numbness resolved with thrombolytics, 5/12/2023 for dysarthria / RUE weakness resolved spontaneously following plavix / ASA) who presents with symptoms suggestive of obstructive sleep apnea.    Estimated body mass index is 26.22 kg/m  as calculated from the following:    Height as of 6/8/23: 1.664 m (5' 5.5\").    Weight as of 6/8/23: 72.6 kg (160 lb).    Data: A full night home sleep study was performed recording the standard physiologic parameters including peripheral arterial tonometry (PAT), sound/snoring, body position,  movement, sound, and oxygen saturation by pulse oximetry. Pulse rate was estimated by oximetry recording. Sleep staging (wake, REM, light, and deep sleep) was derived from PAT signal.  This study was considered adequate based on > 4 hours of quality oximetry and respiratory recording. As specified by the AASM Manual for the Scoring of Sleep and Associated events, version 2.3, Rule VIII.D 1B, 4% oxygen desaturation scoring for hypopneas is used as a standard of care on all home sleep apnea testing.    Total Recording Time: 9 hrs, 11 min  Total Sleep Time: 7 hrs, 33 min  % of Sleep Time REM: 18.8%    Respiratory:  Snoring: Snoring was present.  Respiratory events: The PAT respiratory disturbance index [pRDI] was 25.1 events per hour.  The PAT apnea/hypopnea index [pAHI] was 24.6 events per hour.  CHINA was 24.8 events per hour.  During REM sleep the pAHI was 5.  Sleep Associated Hypoxemia: sustained hypoxemia was not present. Mean oxygen saturation was 96%.  Minimum was " 85%.  Time with saturation less than 88% was 0.8 minutes.    Heart Rate: By pulse oximetry normal rate was noted.     Position: Percent of time spent: supine - 17.5%, prone - 3.6%, on right - 75.6%, on left - 3.3%.  pAHI was 48.5 per hour supine, 0 per hour prone, 20.8 per hour on right side, and 5.9 per hour on left side.     Assessment:   Moderate obstructive sleep apnea.  Sleep associated hypoxemia was not present.    Recommendations:  Consider auto-CPAP at 5-15 cmH2O, oral appliance therapy or polysomnography with full night PAP titration.  Suggest optimizing sleep hygiene and avoiding sleep deprivation.  Weight management.    Diagnosis Code(s): Obstructive Sleep Apnea G47.33    Raf Marquez MD, MD, June 13, 2023   Diplomate, American Board of Family Medicine, Sleep Medicine

## 2023-06-13 NOTE — PROGRESS NOTES
WatchPAT data has been received and has been scored using rule 1B, 4%. Patient to follow up with provider to determine appropriate therapy.    Pat AHI: 24.6    Ordering Provider: Dr Raf Marquez      Sleep Technician/Technologist: Argelia HUIZAR

## 2023-06-19 NOTE — PROGRESS NOTES
"Jeannie Phelps is a 63 year old female who is being evaluated via a billable video visit.       The patient has been notified of following:      \"This video visit will be conducted via a call between you and your physician/provider. We have found that certain health care needs can be provided without the need for an in-person physical exam.  This service lets us provide the care you need with a video conversation.  If a prescription is necessary we can send it directly to your pharmacy.  If lab work is needed we can place an order for that and you can then stop by our lab to have the test done at a later time.     Video visits are billed at different rates depending on your insurance coverage.  Please reach out to your insurance provider with any questions.     If during the course of the call the physician/provider feels a video visit is not appropriate, you will not be charged for this service.\"     Patient has given verbal consent for Video visit? Yes  How would you like to obtain your AVS? Mail a copy  If you are dropped from the video visit, the video invite should be resent to: Text to cell phone: -  Will anyone else be joining your video visit? No  If patient encounters technical issues they should call [phone]      Video-Visit Details     Type of service:  Video Visit     Start Time:  1130  End Time:  1150    Originating Location (pt. Location): Home     Distant Location (provider location):  Off-site, [facility] [place]       Platform used for Video Visit: [employer]    Virtual visit for review of WatchPAT home sleep testing.     Assessment / Plan:     1.)  Moderate DRU (pAHI 24.6) without sleep-associated hypoxemia (SpO2 <= 88% for 0.8 minutes)  - I would recommend treatment to address DRU as a modifiable stroke risk factor given 2 TIAs in the past 1 month and evidence of ischemic stroke and corona radiata, along with initially difficult to control hypertension.  - Sxs of daytime " symptoms of fatigue and sleepiness and frequent sleep disruption.  - Plan to proceed with CPAP auto-titrate 5-15 cm H2O.    SUBJECTIVE:  Jeannie Phelps is a 63 year old female.    Pertinent PMHx:  HTN  CVA x 2 (3/17/2023 with RUE weakness / numbness resolved with thrombolytics, 5/12/2023 for dysarthria / RUE weakness resolved spontaneously following plavix / ASA)     Reviewed discharge summaries:  3/15/2023 - 3/17/2023 - She developed acute onset RUE weakness and numbness as well as a bit of numbness in RLE. She also had some brief blurry vision. Presented to the ED where CT head was negative and CTA H&N w/o LVO. She was given labetolol and then TNK at 0752. Nicardapine drip was then started for HTN. Upon arrival to [facility], her sxs were already much improved.      She previously had issues with BP for a while with side effects from amlodipine and metoprolol. Only on lisinopril 5 mg at home. Her lisinopril dose was increased to 10 mg daily with improvement of BP.      Weakness resolved and she was stable in the ICU. Stroke Neurology was consulted. MRI Brain with no evidence of acute stroke with impression of stroke aborted by thrombolytic therapy per stroke neurology. TTE and BRETT were done with no evidence of ASD or PFO. Patient was advised to remain on aspirin and statin per stroke neurology recs with plan for outpatient MCOT monitoring and stroke neurology follow up.   5/12/2023 - 5/13/2023 - She was just admitted here about 6 weeks ago after having a stroke that was treated with TNK.   MRI subsequently did not show acute infarct.  She was started on aspirin and statin.   She had a MCOT monitor that she wore for 20 days which did not show any evidence of atrial fibrillation.  She has palpitations and had several episodes while wearing the monitor.     She was fine until night prior to admission when she woke up with right hand clumsiness, headache and confusion.   Unlike in March, she did not have a facial  droop but it sounds like she had a little expressive aphasia (just couldn't think of the right word).       She was seen in the ED in [place].   MRI by report showed acute infarct in the left corona radiata.. Received ASA and Plavix prior to transfer.     Echo 6 weeks ago fairly unremarkable other than mild LAE.     [place] back to normal by time she arrived at [facility].    Stroke workup largely done last hospitalization. Imaging did show left corona radiata infarct.     Patient was started on Plavix and discharged home with neuro stroke follow-up and referral to cardiology for loop recorder.     6/8/2023 -we reviewed her recent hospitalizations for TIA and evidence of left corona radiata infarct.  She notes that she has been recommended by her family be evaluated for sleep apnea for many years due to loud regular snoring, observed apnea and strong family history.  She now is very motivated to evaluate given her recent TIAs and stroke, along with worsening daytime fatigue and increased sleep disruption with gasping arousals.     Currently she is in bed around 10 PM and falls asleep quickly.  She will have anywhere from 1-30 awakenings per night, usually she feels this is due to snoring or gasping herself awake, and falls asleep quickly.  She will awaken naturally between 6 to 6:30 AM but can be as late as 9 AM depending on when gets laid out.     Family history of obstructive sleep apnea and 2 brothers, sister, father and suspected and paternal grandfather.     We verified her current medication list and this includes:  Lisinopril 40 mg daily  Hydrochlorothiazide 25 mg daily  Plavix 75 mg daily  Atorvastatin 80 mg daily  Low-dose aspirin     She does not report any abnormal nocturnal behaviors.    A/P for urgent need for DRU evaluation, plan to proceed with WatchPAT HST.    Today - We review the home sleep test results in detail.    WatchPAT - HOME SLEEP STUDY INTERPRETATION     Patient: Jeannie Phelps  MRN:  [id number]  YOB: 1960  Study Date: 6/12/2023  Referring Provider: Thuy Washington  Ordering Provider: Raf Marquez MD, MD     Chain of custody patient verification was not enabled.       Indications for Home Study: Jeannie Phelps is a 63 year old female with a history of HTN, CVA x 2 (3/17/2023 with RUE weakness / numbness resolved with thrombolytics, 5/12/2023 for dysarthria / RUE weakness resolved spontaneously following plavix / ASA) who presents with symptoms suggestive of obstructive sleep apnea.     Estimated body mass index is 26.22 kg/m  as calculated from the following:    Height as of 6/8/23: 1.664 m (5' 5.5\").    Weight as of 6/8/23: 72.6 kg (160 lb).     Data: A full night home sleep study was performed recording the standard physiologic parameters including peripheral arterial tonometry (PAT), sound/snoring, body position,  movement, sound, and oxygen saturation by pulse oximetry. Pulse rate was estimated by oximetry recording. Sleep staging (wake, REM, light, and deep sleep) was derived from PAT signal.  This study was considered adequate based on > 4 hours of quality oximetry and respiratory recording. As specified by the AASM Manual for the Scoring of Sleep and Associated events, version 2.3, Rule VIII.D 1B, 4% oxygen desaturation scoring for hypopneas is used as a standard of care on all home sleep apnea testing.     Total Recording Time: 9 hrs, 11 min  Total Sleep Time: 7 hrs, 33 min  % of Sleep Time REM: 18.8%     Respiratory:  Snoring: Snoring was present.  Respiratory events: The PAT respiratory disturbance index [pRDI] was 25.1 events per hour.  The PAT apnea/hypopnea index [pAHI] was 24.6 events per hour.  CHINA was 24.8 events per hour.  During REM sleep the pAHI was 5.  Sleep Associated Hypoxemia: sustained hypoxemia was not present. Mean oxygen saturation was 96%.  Minimum was 85%.  Time with saturation less than 88% was 0.8 minutes.     Heart Rate: By " pulse oximetry normal rate was noted.      Position: Percent of time spent: supine - 17.5%, prone - 3.6%, on right - 75.6%, on left - 3.3%.  pAHI was 48.5 per hour supine, 0 per hour prone, 20.8 per hour on right side, and 5.9 per hour on left side.      Assessment:   Moderate obstructive sleep apnea.  Sleep associated hypoxemia was not present.     Recommendations:  Consider auto-CPAP at 5-15 cmH2O, oral appliance therapy or polysomnography with full night PAP titration.  Suggest optimizing sleep hygiene and avoiding sleep deprivation.  Weight management.     Diagnosis Code(s): Obstructive Sleep Apnea G47.33     Raf Marquez MD, MD, June 13, 2023   Diplomate, American Board of Family Medicine, Sleep Medicine    Past medical history:    Patient Active Problem List    Diagnosis Date Noted    Hypertension      Priority: Medium    Alcoholism (H)      Priority: Medium    Bunion 10/12/2017     Priority: Medium     Overview:   Added automatically from request for surgery [id number]        Essential hypertension 03/14/2016     Priority: Medium    High risk medication use 07/28/2014     Priority: Medium    Dermatitis due to sun 09/25/2013     Priority: Medium    Symptomatic menopausal or female climacteric states 08/29/2013     Priority: Medium    Lyme disease 07/08/2013     Priority: Medium     Overview:   Lyme Disease-Deer tick bite 7/5/13.  Initially noted a red target lesion on   right lower back        Discoid lupus erythematosus 09/22/2008     Priority: Medium       10 point ROS of systems including Constitutional, Eyes, Respiratory, Cardiovascular, Gastroenterology, Genitourinary, Integumentary, Muscularskeletal, Psychiatric were all negative except for pertinent positives noted in my HPI.    Current Outpatient Medications   Medication Sig Dispense Refill    atorvastatin (LIPITOR) 80 MG tablet Take 1 tablet by mouth daily at 2 pm      clopidogrel (PLAVIX) 75 MG tablet       EPINEPHrine (ANY BX GENERIC EQUIV)  0.3 MG/0.3ML injection 2-pack INJECT CONTENTS OF 1 PEN AS NEEDED FOR ALLERGIC REACTION      fluocinonide (LIDEX) 0.05 % external gel Apply 1 drop topically as needed      hydrochlorothiazide (HYDRODIURIL) 25 MG tablet Take 1 tablet by mouth daily at 2 pm      lisinopril (ZESTRIL) 40 MG tablet Take 1 tablet by mouth daily at 2 pm      lisinopril (ZESTRIL) 5 MG tablet Take 5 mg by mouth daily (Patient not taking: Reported on 6/8/2023)      montelukast (SINGULAIR) 10 MG tablet Take 10 mg by mouth daily      Multiple Vitamins-Minerals (MULTIVITAMIN ADULT PO) Take 1 tablet by mouth daily      rosuvastatin (CRESTOR) 5 MG tablet Take 5 mg by mouth daily      tazarotene (TAZORAC) 0.05 % external gel Apply 1 drop topically as needed         OBJECTIVE:  There were no vitals taken for this visit.    Physical Exam     ---  This note was written with the assistance of the Dragon voice-dictation technology software. The final document, although reviewed, may contain errors. For corrections, please contact the office.    Total time spent preparing to see the patient, review of chart, obtaining history and physical examination, review of sleep testing, review of treatment options, education, discussion with patient and documenting in Epic / EMR was 25 minutes.  All time involved was spent on the day of service for the patient (the same day as the patient's appointment).    Raf Marquez MD    Sleep Medicine  [place], [place]  Main Office: [phone]  [facility] [place], [place]  [address], [address]  Schedule visits: [phone]  Main Office: [phone]  Fax: [phone]

## 2023-06-20 ENCOUNTER — VIRTUAL VISIT (OUTPATIENT)
Dept: PULMONOLOGY | Facility: OTHER | Age: 63
End: 2023-06-20
Attending: FAMILY MEDICINE
Payer: COMMERCIAL

## 2023-06-20 VITALS — HEIGHT: 66 IN | BODY MASS INDEX: 26.03 KG/M2 | WEIGHT: 162 LBS

## 2023-06-20 DIAGNOSIS — G47.33 OSA (OBSTRUCTIVE SLEEP APNEA): Primary | ICD-10-CM

## 2023-06-20 PROCEDURE — 99213 OFFICE O/P EST LOW 20 MIN: CPT | Mod: 95 | Performed by: FAMILY MEDICINE

## 2023-06-20 ASSESSMENT — PAIN SCALES - GENERAL: PAINLEVEL: NO PAIN (0)

## 2023-06-20 NOTE — NURSING NOTE
Is the patient currently in the state of [place]? YES    Visit mode:VIDEO    If the visit is dropped, the patient can be reconnected by: VIDEO VISIT: Text to cell phone: [phone]    Will anyone else be joining the visit? NO      How would you like to obtain your AVS? Mail a copy    Are changes needed to the allergy or medication list? NO    Reason for visit: RECHECK (Watchpat results)

## 2023-06-23 ENCOUNTER — TELEPHONE (OUTPATIENT)
Dept: HOME HEALTH SERVICES | Facility: CLINIC | Age: 63
End: 2023-06-23
Payer: COMMERCIAL

## 2023-06-26 NOTE — TELEPHONE ENCOUNTER
Thank you for the update. Please let us know if you need anything more once the patient is scheduled.

## 2023-06-29 ENCOUNTER — DOCUMENTATION ONLY (OUTPATIENT)
Dept: HOME HEALTH SERVICES | Facility: CLINIC | Age: 63
End: 2023-06-29
Payer: COMMERCIAL

## 2023-06-29 NOTE — PROGRESS NOTES
Patient was offered choice of vendor and chose [facility].  Patient Jeannie Phelps was set up at [place] on June 29, 2023. Patient received a Resmed Airsense 11 Pressures were set at 5-15 cm H2O.   Patient s ramp is  Off and FLEX/EPR is EPR, 2.  Patient received a Resmed Mask name: Airfit [id number]  Nasal mask size Medium, heated tubing and heated humidifier.  Patient has the following compliance requirements: using and visit requirements  Patient has a follow up on 08/01/2023 with Dr. Marquez.    Farhad Waite

## 2023-07-12 ENCOUNTER — OFFICE VISIT (OUTPATIENT)
Dept: FAMILY MEDICINE | Facility: OTHER | Age: 63
End: 2023-07-12
Attending: STUDENT IN AN ORGANIZED HEALTH CARE EDUCATION/TRAINING PROGRAM
Payer: COMMERCIAL

## 2023-07-12 VITALS
HEART RATE: 91 BPM | WEIGHT: 162.7 LBS | DIASTOLIC BLOOD PRESSURE: 78 MMHG | TEMPERATURE: 99 F | BODY MASS INDEX: 26.15 KG/M2 | OXYGEN SATURATION: 100 % | SYSTOLIC BLOOD PRESSURE: 136 MMHG | HEIGHT: 66 IN

## 2023-07-12 DIAGNOSIS — I10 ESSENTIAL HYPERTENSION: Chronic | ICD-10-CM

## 2023-07-12 DIAGNOSIS — R00.2 PALPITATIONS: ICD-10-CM

## 2023-07-12 DIAGNOSIS — I70.1 RENAL ARTERY STENOSIS (H): ICD-10-CM

## 2023-07-12 DIAGNOSIS — Z76.89 ENCOUNTER TO ESTABLISH CARE: Primary | ICD-10-CM

## 2023-07-12 DIAGNOSIS — G47.33 OSA (OBSTRUCTIVE SLEEP APNEA): Chronic | ICD-10-CM

## 2023-07-12 DIAGNOSIS — Z86.73 HISTORY OF CVA (CEREBROVASCULAR ACCIDENT): ICD-10-CM

## 2023-07-12 PROBLEM — L40.9 PSORIASIS OF SCALP: Chronic | Status: ACTIVE | Noted: 2023-07-12

## 2023-07-12 PROBLEM — I63.9 ACUTE STROKE DUE TO ISCHEMIA (H): Status: ACTIVE | Noted: 2023-05-12

## 2023-07-12 PROBLEM — I63.9 STROKE ABORTED BY ADMINISTRATION OF THROMBOLYTIC AGENT (H): Chronic | Status: ACTIVE | Noted: 2023-03-16

## 2023-07-12 PROBLEM — L40.9 PSORIASIS OF SCALP: Status: ACTIVE | Noted: 2023-07-12

## 2023-07-12 PROBLEM — M21.619 BUNION: Status: RESOLVED | Noted: 2017-10-12 | Resolved: 2023-07-12

## 2023-07-12 PROBLEM — I63.9 ACUTE STROKE DUE TO ISCHEMIA (H): Status: RESOLVED | Noted: 2023-05-12 | Resolved: 2023-07-12

## 2023-07-12 PROBLEM — L71.9 ROSACEA: Status: ACTIVE | Noted: 2023-07-12

## 2023-07-12 PROBLEM — I63.9 STROKE ABORTED BY ADMINISTRATION OF THROMBOLYTIC AGENT (H): Status: ACTIVE | Noted: 2023-03-16

## 2023-07-12 PROBLEM — L71.9 ROSACEA: Chronic | Status: ACTIVE | Noted: 2023-07-12

## 2023-07-12 PROBLEM — I51.89 DIASTOLIC DYSFUNCTION: Chronic | Status: ACTIVE | Noted: 2023-07-12

## 2023-07-12 PROBLEM — I51.89 DIASTOLIC DYSFUNCTION: Status: ACTIVE | Noted: 2023-07-12

## 2023-07-12 PROCEDURE — 93000 ELECTROCARDIOGRAM COMPLETE: CPT | Mod: 77 | Performed by: INTERNAL MEDICINE

## 2023-07-12 PROCEDURE — 99204 OFFICE O/P NEW MOD 45 MIN: CPT | Performed by: STUDENT IN AN ORGANIZED HEALTH CARE EDUCATION/TRAINING PROGRAM

## 2023-07-12 RX ORDER — FLUOCINOLONE ACETONIDE 0.1 MG/ML
SOLUTION TOPICAL
COMMUNITY
Start: 2023-04-23

## 2023-07-12 RX ORDER — NIFEDIPINE 30 MG/1
30 TABLET, EXTENDED RELEASE ORAL DAILY
Qty: 30 TABLET | Refills: 1 | Status: SHIPPED | OUTPATIENT
Start: 2023-07-12 | End: 2023-08-28

## 2023-07-12 RX ORDER — AMLODIPINE BESYLATE 5 MG/1
1 TABLET ORAL
COMMUNITY
Start: 2022-10-25 | End: 2023-07-12

## 2023-07-12 RX ORDER — LISINOPRIL 10 MG/1
1 TABLET ORAL
COMMUNITY
Start: 2023-04-08 | End: 2023-07-12

## 2023-07-12 RX ORDER — IVERMECTIN 10 MG/G
CREAM TOPICAL
COMMUNITY
Start: 2023-06-20 | End: 2023-12-11

## 2023-07-12 RX ORDER — DOXYCYCLINE 50 MG/1
CAPSULE ORAL
COMMUNITY
Start: 2023-02-24 | End: 2023-07-12

## 2023-07-12 RX ORDER — ASPIRIN 81 MG
81 TABLET,CHEWABLE ORAL DAILY
COMMUNITY
Start: 2023-03-17

## 2023-07-12 RX ORDER — ATORVASTATIN CALCIUM 80 MG/1
1 TABLET, FILM COATED ORAL AT BEDTIME
COMMUNITY
Start: 2023-03-17 | End: 2024-03-13

## 2023-07-12 RX ORDER — CLOPIDOGREL BISULFATE 75 MG/1
1 TABLET ORAL DAILY
COMMUNITY
Start: 2023-05-14 | End: 2023-08-08

## 2023-07-12 ASSESSMENT — PAIN SCALES - GENERAL: PAINLEVEL: NO PAIN (0)

## 2023-07-12 NOTE — PROGRESS NOTES
Assessment & Plan     Encounter to establish care  Medical, surgical, family, and social histories discussed updated. Reviewed active healthcare problems. Medications reviewed. NICKI obtained for [facility].    Essential hypertension  Intermittent control - occasional spikes with diaphoresis.   Recommend TSH, metanephrines, renal U/S to evaluate for secondary causes. Consider renin/aldosterone also.   Sleep apnea also likely playing large role - needs machine adjusted (not using today).   With elevations, plan to trial addition of procardia to lisinopril 40 and hydrochlorothiazide 25mg. Did not tolerate amlodipine in past due to reported neuropathy. Will avoid BB with upcoming exercise echo and ILR planned but may consider in the future vs diltiazem.   Needs to monitor home blood pressure daily.   Has been avoiding EtOH, certainly could've contributed in the past with 1-2+ drinks/night.   Follow up in 1-2 weeks for labs, 3 weeks for BP recheck.   - TSH with free T4 reflex; Future  - Metanephrines Plasma Free; Future  - US Renal Complete w Arterial Duplex; Future  - NIFEdipine ER OSMOTIC (PROCARDIA XL) 30 MG 24 hr tablet; Take 1 tablet (30 mg) by mouth daily    History of CVA (cerebrovascular accident) - cryptogenic CVA   March 2023 s/p TNK and 5/12/23 with small left corona radiate infarct.   Does have elevated lipoprotein (a) level  Coag w/up completed, s/p MCOT with occasional NSVT but no afib, BRETT within normal limits with no atheroma or PFO  On aspirin + plavix  lipitor increased to 80mg in May - recheck LDL, goal <70  Stroke neuro follow up next week   - Lipid Profile (Chol, Trig, HDL, LDL calc); Future    DRU (obstructive sleep apnea) - Moderate DRU (pAHI 24.6) without sleep-associated hypoxemia (SpO2 <= 88% for 0.8 minutes)  Encouraged treatment - not tolerating current machine. Plans to bring in for adjustment.     Palpitations  Intermittent episodes of palpitations, chest heaviness, diaphoresis, high blood  pressure. Last episode two days ago, now asymptomatic.   EKG overall unchanged, although some depression in V2 (although one lead could not be positioned correctly on limb, would not stick during EKG today).   Has seen cards 6/6/23 - note reviewed.   Exercise echo planned 8/8/23.   Loop recorder also planned 8/23.   Per cards, may get cardiac MRI for grade 2 diastolic dysfunction in the future.   Discussed if palpitations, chest pressure - needs evaluation in Emergency Department right away. She was understanding.   - EKG 12-lead complete w/read - (Clinic Performed)    Review of prior external note(s) from - [facility]where information from [facility] reviewed  Ordering of each unique test  Prescription drug management      [place] Green MD  [facility] - ARASELI Dennis is a 63 year old, presenting for the following health issues:  Establish Care    HPI   Establish care - prior care with Selvin Washington at [facility].      Cerebrovascular Follow-up      Patient history: stroke - (last one was 5/12/2023)    Residual symptoms: Headache (location: left side of head and back of the head) and numbness in the pinkies     Worsened or new symptoms since last visit: No    Daily aspirin use: Yes    Hypertension controlled: no    Monday felt ill - like her heart was flipping over - now resolved this AM. Gets very fatigued with episodes. Was heavy in chest during the episode too on Monday. No chest pain since. No dyspnea. No jaw pain/back pain at this time.   Cardiology appointment 6/6/23 - had run of NSVT. Said consider exercise echocardiogram. Scheduled for 8/8/23.   Getting a monitor placed 8/24/23 loop recorder.    Has stroke follow up in [place] next week.     Lipids 5/12/2023 - LDL 90  On plavix and aspirin    Is also having a lot of bruising due to plavix + aspirin     Hypertension Follow-up    Do you check your blood pressure regularly outside of the clinic? Yes     Are you following a low  "salt diet? No    Are your blood pressures ever more than 140 on the top number (systolic) OR more   than 90 on the bottom number (diastolic), for example 140/90? No    Blood pressure not checking at home.   Sounds like episodes of flushing/sweating with severe range blood pressure.   BP Monday was 189/100, then 147 systolic. Will get severe elevations.  Diagnosed with rosacea but always appears flushed.      DRU - sleep machine is not going well. She lowered humidity. Won't let her lower it more. Brining it to sleep clinic.         Objective    BP (!) 155/95 (BP Location: Right arm, Patient Position: Sitting, Cuff Size: Adult Regular)   Pulse 91   Temp 99  F (37.2  C) (Tympanic)   Ht 1.664 m (5' 5.5\")   Wt 73.8 kg (162 lb 11.2 oz)   SpO2 100%   BMI 26.66 kg/m    Body mass index is 26.66 kg/m .     Physical Exam  Constitutional:       General: She is not in acute distress.     Appearance: Normal appearance. She is not ill-appearing or diaphoretic.   Cardiovascular:      Rate and Rhythm: Normal rate and regular rhythm.      Heart sounds: No murmur heard.  Pulmonary:      Effort: Pulmonary effort is normal.      Breath sounds: Normal breath sounds. No wheezing, rhonchi or rales.   Musculoskeletal:      Right lower leg: No edema.      Left lower leg: No edema.   Neurological:      General: No focal deficit present.      Mental Status: She is alert and oriented to person, place, and time.   Psychiatric:         Mood and Affect: Mood normal.         Behavior: Behavior normal.            EKG - Reviewed and interpreted by me appears normal, NSR, normal axis, normal intervals, no acute ST/T changes c/w ischemia, no LVH by voltage criteria, nonspecific ST-T changes in V2 new from prior                 "

## 2023-07-20 ENCOUNTER — HOSPITAL ENCOUNTER (OUTPATIENT)
Dept: ULTRASOUND IMAGING | Facility: HOSPITAL | Age: 63
Discharge: HOME | End: 2023-07-20
Attending: STUDENT IN AN ORGANIZED HEALTH CARE EDUCATION/TRAINING PROGRAM
Payer: COMMERCIAL

## 2023-07-20 ENCOUNTER — LAB (OUTPATIENT)
Dept: LAB | Facility: OTHER | Age: 63
End: 2023-07-20
Attending: STUDENT IN AN ORGANIZED HEALTH CARE EDUCATION/TRAINING PROGRAM
Payer: COMMERCIAL

## 2023-07-20 DIAGNOSIS — I10 ESSENTIAL HYPERTENSION: Chronic | ICD-10-CM

## 2023-07-20 DIAGNOSIS — Z86.73 HISTORY OF CVA (CEREBROVASCULAR ACCIDENT): ICD-10-CM

## 2023-07-20 LAB
CHOLEST SERPL-MCNC: 156 MG/DL
HDLC SERPL-MCNC: 69 MG/DL
LDLC SERPL CALC-MCNC: 72 MG/DL
NONHDLC SERPL-MCNC: 87 MG/DL
TRIGL SERPL-MCNC: 75 MG/DL
TSH SERPL DL<=0.005 MIU/L-ACNC: 1.88 UIU/ML (ref 0.3–4.2)

## 2023-07-20 PROCEDURE — 83835 ASSAY OF METANEPHRINES: CPT | Mod: 90

## 2023-07-20 PROCEDURE — 36415 COLL VENOUS BLD VENIPUNCTURE: CPT

## 2023-07-20 PROCEDURE — 93975 VASCULAR STUDY: CPT

## 2023-07-20 PROCEDURE — 84443 ASSAY THYROID STIM HORMONE: CPT

## 2023-07-20 PROCEDURE — 80061 LIPID PANEL: CPT

## 2023-07-24 ENCOUNTER — TELEPHONE (OUTPATIENT)
Dept: FAMILY MEDICINE | Facility: OTHER | Age: 63
End: 2023-07-24

## 2023-07-24 ENCOUNTER — HOSPITAL ENCOUNTER (OUTPATIENT)
Dept: CT IMAGING | Facility: HOSPITAL | Age: 63
Discharge: HOME | End: 2023-07-24
Attending: STUDENT IN AN ORGANIZED HEALTH CARE EDUCATION/TRAINING PROGRAM | Admitting: STUDENT IN AN ORGANIZED HEALTH CARE EDUCATION/TRAINING PROGRAM
Payer: COMMERCIAL

## 2023-07-24 DIAGNOSIS — I77.3 FIBROMUSCULAR DYSPLASIA (H): Primary | ICD-10-CM

## 2023-07-24 DIAGNOSIS — I10 ESSENTIAL HYPERTENSION: ICD-10-CM

## 2023-07-24 DIAGNOSIS — I70.1 RENAL ARTERY STENOSIS (H): ICD-10-CM

## 2023-07-24 LAB
ANNOTATION COMMENT IMP: NORMAL
CREAT BLD-MCNC: 0.7 MG/DL (ref 0.5–1)
GFR SERPL CREATININE-BSD FRML MDRD: >60 ML/MIN/1.73M2
METANEPHS SERPL-SCNC: <0.1 NMOL/L
NORMETANEPHRINE SERPL-SCNC: 0.6 NMOL/L

## 2023-07-24 PROCEDURE — 82565 ASSAY OF CREATININE: CPT

## 2023-07-24 PROCEDURE — 74175 CTA ABDOMEN W/CONTRAST: CPT

## 2023-07-24 PROCEDURE — 250N000011 HC RX IP 250 OP 636: Performed by: RADIOLOGY

## 2023-07-24 RX ORDER — IOPAMIDOL 755 MG/ML
63 INJECTION, SOLUTION INTRAVASCULAR ONCE
Status: COMPLETED | OUTPATIENT
Start: 2023-07-24 | End: 2023-07-24

## 2023-07-24 RX ADMIN — IOPAMIDOL 63 ML: 755 INJECTION, SOLUTION INTRAVENOUS at 08:38

## 2023-07-31 NOTE — PROGRESS NOTES
"Jeannie Phelps is a 63 year old female who is being evaluated via a billable video visit.       The patient has been notified of following:      \"This video visit will be conducted via a call between you and your physician/provider. We have found that certain health care needs can be provided without the need for an in-person physical exam.  This service lets us provide the care you need with a video conversation.  If a prescription is necessary we can send it directly to your pharmacy.  If lab work is needed we can place an order for that and you can then stop by our lab to have the test done at a later time.     Video visits are billed at different rates depending on your insurance coverage.  Please reach out to your insurance provider with any questions.     If during the course of the call the physician/provider feels a video visit is not appropriate, you will not be charged for this service.\"     Patient has given verbal consent for Video visit? Yes  How would you like to obtain your AVS? Mail a copy  If you are dropped from the video visit, the video invite should be resent to: Text to cell phone: -  Will anyone else be joining your video visit? No  If patient encounters technical issues they should call [phone]      Video-Visit Details     Type of service:  Video Visit     Start Time:  0900  End Time:  0915    Originating Location (pt. Location): Home     Distant Location (provider location):  Off-site, [facility] [place]       Platform used for Video Visit: Parallocity    Virtual visit for CPAP compliance follow-up.     Assessment / Plan:     1.)  Moderate DRU (pAHI 24.6) without sleep-associated hypoxemia (SpO2 <= 88% for 0.8 minutes)  - I would recommend treatment to address DRU as a modifiable stroke risk factor given 2 TIAs in the past 1 month and evidence of ischemic stroke and corona radiata, along with initially difficult to control hypertension.  - Sxs of daytime symptoms of " fatigue and sleepiness and frequent sleep disruption.  - Appears well controlled with adequate compliance on CPAP auto-titrate 5-15 cm H2O.  - Given some initial struggles with adaptation, we recommended follow-up in 3 months and otherwise would likely follow-up in 1 year.    SUBJECTIVE:  Jeannie Phelps is a 63 year old female.    Pertinent PMHx:  HTN  CVA x 2 (3/17/2023 with RUE weakness / numbness resolved with thrombolytics, 2023 for dysarthria / RUE weakness resolved spontaneously following plavix / ASA)     Prior Sleep Testin2023 - WatchPAT HST with weight 160 lbs, BMI 26.2.  pAHI 24.6.  sustained hypoxemia was not present. Mean oxygen saturation was 96%.  Minimum was 85%.  Time with saturation less than 88% was 0.8 minutes.     Reviewed discharge summaries:  3/15/2023 - 3/17/2023 - She developed acute onset RUE weakness and numbness as well as a bit of numbness in RLE. She also had some brief blurry vision. Presented to the ED where CT head was negative and CTA H&N w/o LVO. She was given labetolol and then TNK at 0752. Nicardapine drip was then started for HTN. Upon arrival to [facility], her sxs were already much improved.      She previously had issues with BP for a while with side effects from amlodipine and metoprolol. Only on lisinopril 5 mg at home. Her lisinopril dose was increased to 10 mg daily with improvement of BP.      Weakness resolved and she was stable in the ICU. Stroke Neurology was consulted. MRI Brain with no evidence of acute stroke with impression of stroke aborted by thrombolytic therapy per stroke neurology. TTE and BRETT were done with no evidence of ASD or PFO. Patient was advised to remain on aspirin and statin per stroke neurology recs with plan for outpatient MCOT monitoring and stroke neurology follow up.   2023 - 2023 - She was just admitted here about 6 weeks ago after having a stroke that was treated with TNK.   MRI subsequently did not show acute  infarct.  She was started on aspirin and statin.   She had a MCOT monitor that she wore for 20 days which did not show any evidence of atrial fibrillation.  She has palpitations and had several episodes while wearing the monitor.     She was fine until night prior to admission when she woke up with right hand clumsiness, headache and confusion.   Unlike in March, she did not have a facial droop but it sounds like she had a little expressive aphasia (just couldn't think of the right word).       She was seen in the ED in [place].   MRI by report showed acute infarct in the left corona radiata.. Received ASA and Plavix prior to transfer.     Echo 6 weeks ago fairly unremarkable other than mild LAE.     [place] back to normal by time she arrived at [facility].    Stroke workup largely done last hospitalization. Imaging did show left corona radiata infarct.     Patient was started on Plavix and discharged home with neuro stroke follow-up and referral to cardiology for loop recorder.     6/8/2023 -we reviewed her recent hospitalizations for TIA and evidence of left corona radiata infarct.  She notes that she has been recommended by her family be evaluated for sleep apnea for many years due to loud regular snoring, observed apnea and strong family history.  She now is very motivated to evaluate given her recent TIAs and stroke, along with worsening daytime fatigue and increased sleep disruption with gasping arousals.     Currently she is in bed around 10 PM and falls asleep quickly.  She will have anywhere from 1-30 awakenings per night, usually she feels this is due to snoring or gasping herself awake, and falls asleep quickly.  She will awaken naturally between 6 to 6:30 AM but can be as late as 9 AM depending on when gets laid out.     Family history of obstructive sleep apnea and 2 brothers, sister, father and suspected and paternal grandfather.     We verified her current medication list and this includes:  Lisinopril 40 mg  "daily  Hydrochlorothiazide 25 mg daily  Plavix 75 mg daily  Atorvastatin 80 mg daily  Low-dose aspirin     She does not report any abnormal nocturnal behaviors.     A/P for urgent need for DRU evaluation, plan to proceed with WatchPAT HST.     6/20/2023 - We review the home sleep test results in detail.    A/P to start CPAP auto 5-15.    Today -overall, she feels that she is getting more adapted to using her CPAP, though she still does not \"like it\".  She had some initial struggles with excessive humidity and excessive to temperature, though these have been reduced and overall much better.  Other challenges of historically preferring to sleep on her side, but she is getting more used to sleeping on her back.  She has noted definite benefits of less nighttime awakenings and longer sleep duration.  Overall, she is happy to continue use of CPAP.      Past medical history:    Patient Active Problem List    Diagnosis Date Noted    History of CVA (cerebrovascular accident) 07/12/2023     Priority: Medium     5/12/2023 - Small acute infarct of the left corona radiata likely  involving a portion of the left corticospinal tract and therefore  corresponding to acute right arm weakness.  3/15/2023 - s/p TNK, no infarcts on imaging     MCOT 4/3/2023 no concerning arrythmia   BRETT 3/16/23 - no veggie, ASD or PFO        Diastolic dysfunction 07/12/2023     Priority: Medium     March 2023 - left ventricular diastolic function is moderately abnormal (Grade II, pseudonormal)      Rosacea 07/12/2023     Priority: Medium     Uses ivermectin per dermatology      Psoriasis of scalp 07/12/2023     Priority: Medium    Palpitations 07/12/2023     Priority: Medium    DRU (obstructive sleep apnea) - Moderate DRU (pAHI 24.6) without sleep-associated hypoxemia (SpO2 <= 88% for 0.8 minutes) 06/20/2023     Priority: Medium      Moderate DRU (pAHI 24.6) without sleep-associated hypoxemia (SpO2 <= 88% for 0.8 minutes)  - I would recommend treatment " to address DRU as a modifiable stroke risk factor given 2 TIAs in the past 1 month and evidence of ischemic stroke and corona radiata, along with initially difficult to control hypertension.  - Sxs of daytime symptoms of fatigue and sleepiness and frequent sleep disruption.  - Plan to proceed with CPAP auto-titrate 5-15 cm H2O.      Stroke aborted by administration of thrombolytic agent (H) - 3/15/23; TNK 03/16/2023     Priority: Medium    Essential hypertension 03/14/2016     Priority: Medium    Symptomatic menopausal or female climacteric states 08/29/2013     Priority: Medium       10 point ROS of systems including Constitutional, Eyes, Respiratory, Cardiovascular, Gastroenterology, Genitourinary, Integumentary, Muscularskeletal, Psychiatric were all negative except for pertinent positives noted in my HPI.    Current Outpatient Medications   Medication Sig Dispense Refill    ASPIRIN LOW DOSE 81 MG chewable tablet       atorvastatin (LIPITOR) 80 MG tablet Take 1 tablet by mouth At Bedtime      clopidogrel (PLAVIX) 75 MG tablet Take 1 tablet by mouth daily      EPINEPHrine (ANY BX GENERIC EQUIV) 0.3 MG/0.3ML injection 2-pack INJECT CONTENTS OF 1 PEN AS NEEDED FOR ALLERGIC REACTION      fluocinolone (SYNALAR) 0.01 % solution APPLY TO THE SCALP ONCE - TWICE DAILY FOR UP TO 4 WEEKS. TAKE A ONE WEEK BREAK, AND REPEAT AS NEEDED      fluocinonide (LIDEX) 0.05 % external gel Apply 1 drop topically as needed      hydrochlorothiazide (HYDRODIURIL) 25 MG tablet Take 1 tablet by mouth daily at 2 pm      ivermectin (SOOLANTRA) 1 % cream APPLY CREAM TOPICALLY TO YOUR ENTIRE FACE AT BEDTIME AFTER WASHING      lisinopril (ZESTRIL) 40 MG tablet Take 1 tablet by mouth daily at 2 pm      Multiple Vitamins-Minerals (MULTIVITAMIN ADULT PO) Take 1 tablet by mouth daily      NIFEdipine ER OSMOTIC (PROCARDIA XL) 30 MG 24 hr tablet Take 1 tablet (30 mg) by mouth daily 30 tablet 1    tazarotene (TAZORAC) 0.05 % external gel Apply 1 drop  topically as needed         OBJECTIVE:  There were no vitals taken for this visit.    Physical Exam     ---  This note was written with the assistance of the Dragon voice-dictation technology software. The final document, although reviewed, may contain errors. For corrections, please contact the office.    Total time spent preparing to see the patient, review of chart, obtaining history and physical examination, review of sleep testing, review of treatment options, education, discussion with patient and documenting in Epic / EMR was 20 minutes.  All time involved was spent on the day of service for the patient (the same day as the patient's appointment).    Raf Marquez MD    Sleep Medicine  [place], [place]  Main Office: [phone]  [facility] [place]  [address] [place] [address], [id number]  Schedule visits: [phone]  Main Office: [phone]  Fax: [phone]

## 2023-08-01 ENCOUNTER — VIRTUAL VISIT (OUTPATIENT)
Dept: PULMONOLOGY | Facility: OTHER | Age: 63
End: 2023-08-01
Attending: FAMILY MEDICINE
Payer: COMMERCIAL

## 2023-08-01 VITALS — WEIGHT: 161 LBS | HEIGHT: 66 IN | BODY MASS INDEX: 25.88 KG/M2

## 2023-08-01 DIAGNOSIS — I10 ESSENTIAL HYPERTENSION: ICD-10-CM

## 2023-08-01 DIAGNOSIS — G47.33 OSA (OBSTRUCTIVE SLEEP APNEA): Primary | ICD-10-CM

## 2023-08-01 DIAGNOSIS — Z86.73 HISTORY OF TIA (TRANSIENT ISCHEMIC ATTACK) AND STROKE: ICD-10-CM

## 2023-08-01 PROCEDURE — 99213 OFFICE O/P EST LOW 20 MIN: CPT | Mod: 95 | Performed by: FAMILY MEDICINE

## 2023-08-01 ASSESSMENT — PAIN SCALES - GENERAL: PAINLEVEL: NO PAIN (0)

## 2023-08-01 NOTE — PROGRESS NOTES
SUBJECTIVE:   CC: Jeannie is an 63 year old who presents for preventive health visit.       8/2/2023    10:49 AM   Additional Questions   Roomed by timothy strong   Accompanied by self       Healthy Habits:     Getting at least 3 servings of Calcium per day:  Yes    Bi-annual eye exam:  Yes    Dental care twice a year:  Yes    Sleep apnea or symptoms of sleep apnea:  Sleep apnea    Diet:  Regular (no restrictions)    Frequency of exercise:  4-5 days/week    Duration of exercise:  Greater than 60 minutes    Taking medications regularly:  Yes    Medication side effects:  None    Additional concerns today:  No    Hypertension Follow-up    Do you check your blood pressure regularly outside of the clinic? Yes   Are you following a low salt diet? No  Are your blood pressures ever more than 140 on the top number (systolic) OR more   than 90 on the bottom number (diastolic), for example 140/90? No    BP average 115-130  Started nifedipine last visit   U/S and CT Angio kidneys showed fibromuscular dysplasia  Awaiting nephro appointment   DRU - doing well. Sleep has been better now.     Have you ever done Advance Care Planning? (For example, a Health Directive, POLST, or a discussion with a medical provider or your loved ones about your wishes): Yes, patient states has an Advance Care Planning document and will bring a copy to the clinic.    Social History     Tobacco Use    Smoking status: Never    Smokeless tobacco: Never   Substance Use Topics    Alcohol use: Yes     Comment: 2 night         8/2/2023    10:43 AM   Alcohol Use   Prescreen: >3 drinks/day or >7 drinks/week? No       Reviewed orders with patient.  Reviewed health maintenance and updated orders accordingly - Yes    Breast Cancer Screening:  Any new diagnosis of family breast, ovarian, or bowel cancer? No    FHS-7:       8/2/2023    10:44 AM   Breast CA Risk Assessment (FHS-7)   Did any of your first-degree relatives have breast or ovarian cancer? Yes   Did any of  your relatives have bilateral breast cancer? Yes   Did any man in your family have breast cancer? No   Did any woman in your family have breast and ovarian cancer? Yes   Did any woman in your family have breast cancer before age 50 y? Yes   Do you have 2 or more relatives with breast and/or ovarian cancer? Yes   Do you have 2 or more relatives with breast and/or bowel cancer? Yes     Grandma mastectomy, mom double mastectomy, niece with ovarian and breast cancer   She did have genetic testing - nothing concerning    Pertinent mammograms are reviewed under the imaging tab.    History of abnormal Pap smear: NO - age 30- 65 PAP every 3 years recommended     Reviewed and updated as needed this visit by clinical staff   Tobacco  Allergies  Meds  Problems  Med Hx  Surg Hx  Fam Hx          Reviewed and updated as needed this visit by Provider   Tobacco  Allergies  Meds  Problems  Med Hx  Surg Hx  Fam Hx           Review of Systems   Constitutional:  Negative for chills and fever.   HENT:  Negative for congestion, ear pain, hearing loss and sore throat.    Eyes:  Negative for pain and visual disturbance.   Respiratory:  Negative for cough and shortness of breath.    Cardiovascular:  Negative for chest pain, palpitations and peripheral edema.   Gastrointestinal:  Negative for abdominal pain, constipation, diarrhea, heartburn, hematochezia and nausea.   Breasts:  Negative for tenderness, breast mass and discharge.   Genitourinary:  Negative for dysuria, frequency, genital sores, hematuria, pelvic pain, urgency, vaginal bleeding and vaginal discharge.   Musculoskeletal:  Negative for arthralgias, joint swelling and myalgias.   Skin:  Negative for rash.   Neurological:  Negative for dizziness, weakness, headaches and paresthesias.   Psychiatric/Behavioral:  Negative for mood changes. The patient is not nervous/anxious.       OBJECTIVE:   /70 (BP Location: Right arm, Patient Position: Sitting, Cuff Size:  "Adult Regular)   Pulse 78   Temp 97.7  F (36.5  C) (Tympanic)   Ht 1.655 m (5' 5.16\")   Wt 73.8 kg (162 lb 9.6 oz)   SpO2 98%   BMI 26.93 kg/m    Physical Exam  Constitutional:       General: She is not in acute distress.     Appearance: Normal appearance. She is well-developed. She is not ill-appearing.   HENT:      Head: Normocephalic and atraumatic.      Right Ear: Tympanic membrane and external ear normal.      Left Ear: Tympanic membrane and external ear normal.      Nose: Nose normal.      Mouth/Throat:      Mouth: Mucous membranes are moist.      Pharynx: No oropharyngeal exudate.   Eyes:      Extraocular Movements: Extraocular movements intact.      Conjunctiva/sclera: Conjunctivae normal.   Neck:      Thyroid: No thyroid mass, thyromegaly or thyroid tenderness.   Cardiovascular:      Rate and Rhythm: Normal rate and regular rhythm.      Pulses: Normal pulses.      Heart sounds: Normal heart sounds, S1 normal and S2 normal. No murmur heard.  Pulmonary:      Effort: Pulmonary effort is normal. No respiratory distress.      Breath sounds: Normal breath sounds. No wheezing, rhonchi or rales.   Chest:   Breasts:     Right: Normal. No mass or nipple discharge.      Left: Normal. No mass or nipple discharge.   Abdominal:      General: Bowel sounds are normal. There is no abdominal bruit.      Palpations: Abdomen is soft. There is no mass or pulsatile mass.      Tenderness: There is no abdominal tenderness.   Musculoskeletal:         General: Normal range of motion.      Cervical back: Neck supple.      Right lower leg: No edema.      Left lower leg: No edema.      Comments: Some bruising over left lower leg from fall last week. No bony tenderness.   Lymphadenopathy:      Cervical: No cervical adenopathy.      Upper Body:      Right upper body: No supraclavicular or axillary adenopathy.      Left upper body: No supraclavicular or axillary adenopathy.   Skin:     General: Skin is warm and dry.      Capillary " Refill: Capillary refill takes less than 2 seconds.      Findings: No rash.   Neurological:      Mental Status: She is alert and oriented to person, place, and time.      Gait: Gait is intact.   Psychiatric:         Attention and Perception: Attention normal.         Mood and Affect: Mood normal.         Speech: Speech normal.         Behavior: Behavior normal.         Thought Content: Thought content normal.         Cognition and Memory: Cognition normal.         Judgment: Judgment normal.       No results found for any visits on 08/02/23.        ASSESSMENT/PLAN:   Routine general medical examination at a health care facility  No need for Pap smear.  Status post full hysterectomy.  Mammogram ordered.    Cologuard ordered.  Up to date on lipids.  Planning nurse visit to return for shingles shot.  Td shot today.     Essential hypertension  Improved control.  Continues on hydrochlorothiazide, lisinopril, and nifedipine.  Update electrolytes today.  Continue home monitoring. Follow up six months, sooner if needed.   - Basic metabolic panel; Future  - Basic metabolic panel    Fibromuscular dysplasia (H)  Noted on recent CT angio. Right renal artery.   Has had extensive vascular imaging.    Awaiting appointment with nephrology.    Update BMP today.    DRU (obstructive sleep apnea) - Moderate DRU (pAHI 24.6) without sleep-associated hypoxemia (SpO2 <= 88% for 0.8 minutes)  Tolerating sleep apparatus better.    Breast cancer screening by mammogram  - MA Screen Bilateral w/Justin; Future    Screen for colon cancer  Low risk, agreed that Cologuard is reasonable for her.  - COLOGUARD([employer])      Patient has been advised of split billing requirements and indicates understanding: No      COUNSELING:  Reviewed preventive health counseling, as reflected in patient instructions       Regular exercise       Healthy diet/nutrition       Colorectal Cancer Screening      BMI:   Estimated body mass index is 26.93 kg/m  as  "calculated from the following:    Height as of this encounter: 1.655 m (5' 5.16\").    Weight as of this encounter: 73.8 kg (162 lb 9.6 oz).   Weight management plan: Discussed healthy diet and exercise guidelines      She reports that she has never smoked. She has never used smokeless tobacco.      Virginia Green MD  [facility] - [place]  "

## 2023-08-01 NOTE — NURSING NOTE
Is the patient currently in the state of [place]? YES    Visit mode:VIDEO    If the visit is dropped, the patient can be reconnected by: VIDEO VISIT: Text to cell phone: [phone]    Will anyone else be joining the visit? NO    How would you like to obtain your AVS? MyChart    Are changes needed to the allergy or medication list? NO    Reason for visit: RECHECK (Compliance follow up)    Has patient had flu shot for current/most recent flu season? If so, when? No    SLOANE Monet/DEMETRA

## 2023-08-01 NOTE — PROGRESS NOTES
CPAP Compliance Visit:       Name: Jeannie Phelps MRN# [id number]   Age: 63 year old YOB: 1960     Date of Consultation: August 1, 2023  Primary care provider: Virginia Green    Compliance:   90 % of days with >4 hours of use.   2days/30 with no use   Average use: 7hours 37minutes per day   95%ile leak 16.8 L/min   CPAP 95% pressure 10.1 cm   AHI 1.1 events/hour   CHAD 0.9  PB 0%     Impression:   Patient is compliant with CPAP therapy and using CPAP with no issues. Continue to use CPAP and follow up with sleep medicine as necessary.

## 2023-08-02 ENCOUNTER — OFFICE VISIT (OUTPATIENT)
Dept: FAMILY MEDICINE | Facility: OTHER | Age: 63
End: 2023-08-02
Attending: STUDENT IN AN ORGANIZED HEALTH CARE EDUCATION/TRAINING PROGRAM
Payer: COMMERCIAL

## 2023-08-02 VITALS
HEIGHT: 65 IN | BODY MASS INDEX: 27.09 KG/M2 | HEART RATE: 78 BPM | WEIGHT: 162.6 LBS | DIASTOLIC BLOOD PRESSURE: 70 MMHG | TEMPERATURE: 97.7 F | OXYGEN SATURATION: 98 % | SYSTOLIC BLOOD PRESSURE: 117 MMHG

## 2023-08-02 DIAGNOSIS — I77.3 FIBROMUSCULAR DYSPLASIA (H): ICD-10-CM

## 2023-08-02 DIAGNOSIS — Z12.11 SCREEN FOR COLON CANCER: ICD-10-CM

## 2023-08-02 DIAGNOSIS — I10 ESSENTIAL HYPERTENSION: Chronic | ICD-10-CM

## 2023-08-02 DIAGNOSIS — Z00.00 ROUTINE GENERAL MEDICAL EXAMINATION AT A HEALTH CARE FACILITY: Primary | ICD-10-CM

## 2023-08-02 DIAGNOSIS — G47.33 OSA (OBSTRUCTIVE SLEEP APNEA): Chronic | ICD-10-CM

## 2023-08-02 DIAGNOSIS — Z12.31 BREAST CANCER SCREENING BY MAMMOGRAM: ICD-10-CM

## 2023-08-02 PROBLEM — R76.0 ANTIPHOSPHOLIPID ANTIBODY POSITIVE: Status: ACTIVE | Noted: 2023-08-02

## 2023-08-02 PROBLEM — Z80.3 FAMILY HISTORY OF BREAST CANCER: Chronic | Status: ACTIVE | Noted: 2023-08-02

## 2023-08-02 PROBLEM — Z80.3 FAMILY HISTORY OF BREAST CANCER: Status: ACTIVE | Noted: 2023-08-02

## 2023-08-02 LAB
ANION GAP SERPL CALCULATED.3IONS-SCNC: 12 MMOL/L (ref 7–15)
BUN SERPL-MCNC: 12.4 MG/DL (ref 8–23)
CALCIUM SERPL-MCNC: 9.5 MG/DL (ref 8.8–10.2)
CHLORIDE SERPL-SCNC: 96 MMOL/L (ref 98–107)
CREAT SERPL-MCNC: 0.7 MG/DL (ref 0.51–0.95)
DEPRECATED HCO3 PLAS-SCNC: 26 MMOL/L (ref 22–29)
GFR SERPL CREATININE-BSD FRML MDRD: >90 ML/MIN/1.73M2
GLUCOSE SERPL-MCNC: 103 MG/DL (ref 70–99)
POTASSIUM SERPL-SCNC: 3.7 MMOL/L (ref 3.4–5.3)
SODIUM SERPL-SCNC: 134 MMOL/L (ref 136–145)

## 2023-08-02 PROCEDURE — 80048 BASIC METABOLIC PNL TOTAL CA: CPT | Performed by: STUDENT IN AN ORGANIZED HEALTH CARE EDUCATION/TRAINING PROGRAM

## 2023-08-02 PROCEDURE — 36415 COLL VENOUS BLD VENIPUNCTURE: CPT | Performed by: STUDENT IN AN ORGANIZED HEALTH CARE EDUCATION/TRAINING PROGRAM

## 2023-08-02 PROCEDURE — 90714 TD VACC NO PRESV 7 YRS+ IM: CPT | Performed by: STUDENT IN AN ORGANIZED HEALTH CARE EDUCATION/TRAINING PROGRAM

## 2023-08-02 PROCEDURE — 90471 IMMUNIZATION ADMIN: CPT | Performed by: STUDENT IN AN ORGANIZED HEALTH CARE EDUCATION/TRAINING PROGRAM

## 2023-08-02 PROCEDURE — 99396 PREV VISIT EST AGE 40-64: CPT | Mod: 25 | Performed by: STUDENT IN AN ORGANIZED HEALTH CARE EDUCATION/TRAINING PROGRAM

## 2023-08-02 RX ORDER — FEXOFENADINE HCL 180 MG/1
180 TABLET ORAL DAILY PRN
COMMUNITY
End: 2023-12-15

## 2023-08-02 ASSESSMENT — ENCOUNTER SYMPTOMS
SORE THROAT: 0
COUGH: 0
DIZZINESS: 0
CONSTIPATION: 0
NERVOUS/ANXIOUS: 0
WEAKNESS: 0
ABDOMINAL PAIN: 0
DYSURIA: 0
NAUSEA: 0
MYALGIAS: 0
HEARTBURN: 0
DIARRHEA: 0
HEMATOCHEZIA: 0
JOINT SWELLING: 0
FREQUENCY: 0
CHILLS: 0
ARTHRALGIAS: 0
HEADACHES: 0
SHORTNESS OF BREATH: 0
FEVER: 0
EYE PAIN: 0
PALPITATIONS: 0
HEMATURIA: 0
PARESTHESIAS: 0
BREAST MASS: 0

## 2023-08-02 ASSESSMENT — PAIN SCALES - GENERAL: PAINLEVEL: NO PAIN (0)

## 2023-08-02 NOTE — PATIENT INSTRUCTIONS
You are due for your mammogram. You can call the [facility] at [phone] to schedule an appointment or you can also schedule through [facility].          Preventive Health Recommendations  Female Ages 50 - 64    Yearly exam: See your health care provider every year in order to  Review health changes.   Discuss preventive care.    Review your medicines if your doctor has prescribed any.    Get a Pap test every three years (unless you have an abnormal result and your provider advises testing more often).  If you get Pap tests with HPV test, you only need to test every 5 years, unless you have an abnormal result.   You do not need a Pap test if your uterus was removed (hysterectomy) and you have not had cancer.  You should be tested each year for STDs (sexually transmitted diseases) if you're at risk.   Have a mammogram every 1 to 2 years.  Have a colonoscopy at age 50, or have a yearly FIT test (stool test). These exams screen for colon cancer.    Have a cholesterol test every 5 years, or more often if advised.  Have a diabetes test (fasting glucose) every three years. If you are at risk for diabetes, you should have this test more often.   If you are at risk for osteoporosis (brittle bone disease), think about having a bone density scan (DEXA).    Shots: Get a flu shot each year. Get a tetanus shot every 10 years.    Nutrition:   Eat at least 5 servings of fruits and vegetables each day.  Eat whole-grain bread, whole-wheat pasta and brown rice instead of white grains and rice.  Get adequate Calcium and Vitamin D.     Lifestyle  Exercise at least 150 minutes a week (30 minutes a day, 5 days a week). This will help you control your weight and prevent disease.  Limit alcohol to one drink per day.  No smoking.   Wear sunscreen to prevent skin cancer.   See your dentist every six months for an exam and cleaning.  See your eye doctor every 1 to 2 years.

## 2023-08-08 DIAGNOSIS — I63.9 STROKE ABORTED BY ADMINISTRATION OF THROMBOLYTIC AGENT (H): Primary | Chronic | ICD-10-CM

## 2023-08-08 RX ORDER — CLOPIDOGREL BISULFATE 75 MG/1
75 TABLET ORAL DAILY
Qty: 90 TABLET | Refills: 3 | Status: SHIPPED | OUTPATIENT
Start: 2023-08-08 | End: 2023-10-10

## 2023-08-08 NOTE — TELEPHONE ENCOUNTER
Patient calling to ask if PCP will manage Plavix going forward.  Pended open script for PCP to review & advise

## 2023-08-15 ENCOUNTER — OFFICE VISIT (OUTPATIENT)
Dept: FAMILY MEDICINE | Facility: OTHER | Age: 63
End: 2023-08-15
Attending: STUDENT IN AN ORGANIZED HEALTH CARE EDUCATION/TRAINING PROGRAM
Payer: COMMERCIAL

## 2023-08-15 ENCOUNTER — TELEPHONE (OUTPATIENT)
Dept: FAMILY MEDICINE | Facility: OTHER | Age: 63
End: 2023-08-15

## 2023-08-15 ENCOUNTER — HOSPITAL ENCOUNTER (OUTPATIENT)
Dept: ULTRASOUND IMAGING | Facility: HOSPITAL | Age: 63
Discharge: HOME | End: 2023-08-15
Attending: FAMILY MEDICINE
Payer: COMMERCIAL

## 2023-08-15 VITALS
TEMPERATURE: 98.4 F | WEIGHT: 162 LBS | BODY MASS INDEX: 26.83 KG/M2 | DIASTOLIC BLOOD PRESSURE: 80 MMHG | OXYGEN SATURATION: 98 % | SYSTOLIC BLOOD PRESSURE: 130 MMHG | HEART RATE: 94 BPM

## 2023-08-15 DIAGNOSIS — Z23 ENCOUNTER FOR ADMINISTRATION OF VACCINE: Primary | ICD-10-CM

## 2023-08-15 DIAGNOSIS — S89.92XD INJURY OF LEFT LOWER EXTREMITY, SUBSEQUENT ENCOUNTER: ICD-10-CM

## 2023-08-15 DIAGNOSIS — M79.89 LEFT LEG SWELLING: Primary | ICD-10-CM

## 2023-08-15 DIAGNOSIS — M79.89 LEFT LEG SWELLING: ICD-10-CM

## 2023-08-15 PROCEDURE — 93971 EXTREMITY STUDY: CPT | Mod: LT

## 2023-08-15 PROCEDURE — 99213 OFFICE O/P EST LOW 20 MIN: CPT | Performed by: FAMILY MEDICINE

## 2023-08-15 PROCEDURE — 90471 IMMUNIZATION ADMIN: CPT

## 2023-08-15 PROCEDURE — 90750 HZV VACC RECOMBINANT IM: CPT

## 2023-08-15 NOTE — PROGRESS NOTES
Assessment & Plan     Injury of LLE  Left leg swelling  - US Lower Extremity Venous Duplex Left; Future    Seems to be slowly resolving contusion.  No evidence of significant tendon rupture.  Will rule out traumatic DVT (low suspicion) as patient reports her mother has had multiple LLE DVTs in the past (no previous personal DVT history).  If this is negative, we'll give it a few more weeks to continue improving.  If doesn't full resolve in a few weeks, consider MRI.      STACEY PRATER,   [facility] - [place] Dennis is a 63 year old, presenting for the following health issues:  Pain (Left leg/foot)        8/15/2023     3:21 PM   Additional Questions   Roomed by gY Lopez CMA   Accompanied by Self         8/15/2023     3:21 PM   Patient Reported Additional Medications   Patient reports taking the following new medications None       HPI     Roughly 3 weeks ago she leaned against a pontoon boat door that wasn't fully latched.  Fell backwards off boat, hitting posterior left calf on raised portion of boat.  No skin break.  Leg did bruise/swell.  Bruising much improved, almost gone.  Here because posterior calf still sore/achy, and foot still stiff - but improving.  No numbness/tingling.  No other injuyr.    Pain History:  When did you first notice your pain? Couple weeks ago   Have you seen anyone else for your pain? Yes - Dr. Green  How has your pain affected your ability to work? Not currently working - unrelated to pain  Where in your body do you have pain?  Lower left leg /foot.  swelling          Review of Systems   Constitutional:  Negative for fever.   Respiratory:  Negative for shortness of breath.    Cardiovascular:  Positive for peripheral edema. Negative for chest pain.            Objective    /80   Pulse 94   Temp 98.4  F (36.9  C) (Tympanic)   Wt 73.5 kg (162 lb)   SpO2 98%   BMI 26.83 kg/m    Body mass index is 26.83 kg/m .  Physical  Exam  Constitutional:       General: She is not in acute distress.     Appearance: Normal appearance.   Cardiovascular:      Pulses: Normal pulses.      Comments: Mild arnkle level nonpitting edema.  Pulmonary:      Effort: Pulmonary effort is normal.   Musculoskeletal:      Comments: Mild discomfort to palpation of proximal area of distal 1/3 of calf.  Normal ankle AROM.  Minimal flexion of toes/midfoot (some movement present).  LT sensation intact in toes.  Walks without difficulty.   Skin:     Comments: Minimal yellow resolving bruising anterior proximal shin and distal dorsal foot area, no posterior bruising.   Neurological:      Mental Status: She is alert and oriented to person, place, and time.

## 2023-08-15 NOTE — TELEPHONE ENCOUNTER
Jeannie wanted you to know she didn't get an appointment for her kidney. Please call her with any questions  [phone]

## 2023-08-21 ASSESSMENT — ENCOUNTER SYMPTOMS
FEVER: 0
SHORTNESS OF BREATH: 0

## 2023-08-22 LAB — NONINV COLON CA DNA+OCC BLD SCRN STL QL: NEGATIVE

## 2023-08-25 DIAGNOSIS — I10 ESSENTIAL HYPERTENSION: Chronic | ICD-10-CM

## 2023-08-28 RX ORDER — NIFEDIPINE 30 MG/1
TABLET, EXTENDED RELEASE ORAL DAILY
Qty: 30 TABLET | Refills: 10 | Status: SHIPPED | OUTPATIENT
Start: 2023-08-28 | End: 2024-03-13

## 2023-09-11 ENCOUNTER — TELEPHONE (OUTPATIENT)
Dept: FAMILY MEDICINE | Facility: OTHER | Age: 63
End: 2023-09-11

## 2023-09-11 NOTE — TELEPHONE ENCOUNTER
10:57 AM    Reason for Call: Phone Call    Description: pt is needing a call, has had diarrhea for over six weeks. She does not know if her medication is causing this to happen. She did get sent to triage and was on hold would like this message to be sent to her pcp and nurse triage    Was an appointment offered for this call? No  If yes : Appointment type              Date    Preferred method for responding to this message: Telephone Call  What is your phone number ?  [phone]    If we cannot reach you directly, may we leave a detailed response at the number you provided? Yes    Can this message wait until your PCP/provider returns, if available today? Not applicable    Vera Dimas

## 2023-09-11 NOTE — TELEPHONE ENCOUNTER
I don't see an obvious medication causing the diarrhea. I would recommend we evaluate in person.   I have an opening Wednesday at 11am, or could see her tomorrow 9/12/23 at 4:30 (arrive at 4:15).

## 2023-09-12 ENCOUNTER — OFFICE VISIT (OUTPATIENT)
Dept: FAMILY MEDICINE | Facility: OTHER | Age: 63
End: 2023-09-12
Attending: STUDENT IN AN ORGANIZED HEALTH CARE EDUCATION/TRAINING PROGRAM
Payer: COMMERCIAL

## 2023-09-12 VITALS
TEMPERATURE: 98.6 F | BODY MASS INDEX: 27.21 KG/M2 | WEIGHT: 164.31 LBS | OXYGEN SATURATION: 97 % | DIASTOLIC BLOOD PRESSURE: 76 MMHG | HEART RATE: 92 BPM | SYSTOLIC BLOOD PRESSURE: 120 MMHG

## 2023-09-12 DIAGNOSIS — R19.7 DIARRHEA, UNSPECIFIED TYPE: Primary | ICD-10-CM

## 2023-09-12 DIAGNOSIS — T78.40XD ALLERGIC REACTION, SUBSEQUENT ENCOUNTER: ICD-10-CM

## 2023-09-12 PROBLEM — I77.3 FIBROMUSCULAR DYSPLASIA (H): Status: ACTIVE | Noted: 2023-09-12

## 2023-09-12 PROBLEM — I70.1 RENAL ARTERY STENOSIS (H): Status: ACTIVE | Noted: 2023-09-12

## 2023-09-12 LAB
ALBUMIN SERPL BCG-MCNC: 4.8 G/DL (ref 3.5–5.2)
ALP SERPL-CCNC: 86 U/L (ref 35–104)
ALT SERPL W P-5'-P-CCNC: 30 U/L (ref 0–50)
ANION GAP SERPL CALCULATED.3IONS-SCNC: 9 MMOL/L (ref 7–15)
AST SERPL W P-5'-P-CCNC: 32 U/L (ref 0–45)
BASOPHILS # BLD AUTO: 0 10E3/UL (ref 0–0.2)
BASOPHILS NFR BLD AUTO: 1 %
BILIRUB SERPL-MCNC: 0.5 MG/DL
BUN SERPL-MCNC: 18 MG/DL (ref 8–23)
CALCIUM SERPL-MCNC: 9.6 MG/DL (ref 8.8–10.2)
CHLORIDE SERPL-SCNC: 102 MMOL/L (ref 98–107)
CREAT SERPL-MCNC: 0.74 MG/DL (ref 0.51–0.95)
CRP SERPL-MCNC: <3 MG/L
DEPRECATED HCO3 PLAS-SCNC: 27 MMOL/L (ref 22–29)
EGFRCR SERPLBLD CKD-EPI 2021: >90 ML/MIN/1.73M2
EOSINOPHIL # BLD AUTO: 0.1 10E3/UL (ref 0–0.7)
EOSINOPHIL NFR BLD AUTO: 2 %
ERYTHROCYTE [DISTWIDTH] IN BLOOD BY AUTOMATED COUNT: 12.7 % (ref 10–15)
ERYTHROCYTE [SEDIMENTATION RATE] IN BLOOD BY WESTERGREN METHOD: 13 MM/HR (ref 0–30)
GLUCOSE SERPL-MCNC: 97 MG/DL (ref 70–99)
HCT VFR BLD AUTO: 36.8 % (ref 35–47)
HGB BLD-MCNC: 12.4 G/DL (ref 11.7–15.7)
IMM GRANULOCYTES # BLD: 0 10E3/UL
IMM GRANULOCYTES NFR BLD: 0 %
LYMPHOCYTES # BLD AUTO: 1.3 10E3/UL (ref 0.8–5.3)
LYMPHOCYTES NFR BLD AUTO: 21 %
MCH RBC QN AUTO: 35.4 PG (ref 26.5–33)
MCHC RBC AUTO-ENTMCNC: 33.7 G/DL (ref 31.5–36.5)
MCV RBC AUTO: 105 FL (ref 78–100)
MONOCYTES # BLD AUTO: 0.6 10E3/UL (ref 0–1.3)
MONOCYTES NFR BLD AUTO: 10 %
NEUTROPHILS # BLD AUTO: 4 10E3/UL (ref 1.6–8.3)
NEUTROPHILS NFR BLD AUTO: 66 %
NRBC # BLD AUTO: 0 10E3/UL
NRBC BLD AUTO-RTO: 0 /100
PLATELET # BLD AUTO: 152 10E3/UL (ref 150–450)
POTASSIUM SERPL-SCNC: 3.8 MMOL/L (ref 3.4–5.3)
PROT SERPL-MCNC: 7.5 G/DL (ref 6.4–8.3)
RBC # BLD AUTO: 3.5 10E6/UL (ref 3.8–5.2)
SODIUM SERPL-SCNC: 138 MMOL/L (ref 136–145)
TSH SERPL DL<=0.005 MIU/L-ACNC: 2.68 UIU/ML (ref 0.3–4.2)
WBC # BLD AUTO: 6 10E3/UL (ref 4–11)

## 2023-09-12 PROCEDURE — 85652 RBC SED RATE AUTOMATED: CPT | Performed by: STUDENT IN AN ORGANIZED HEALTH CARE EDUCATION/TRAINING PROGRAM

## 2023-09-12 PROCEDURE — 36415 COLL VENOUS BLD VENIPUNCTURE: CPT | Performed by: STUDENT IN AN ORGANIZED HEALTH CARE EDUCATION/TRAINING PROGRAM

## 2023-09-12 PROCEDURE — 99213 OFFICE O/P EST LOW 20 MIN: CPT | Performed by: STUDENT IN AN ORGANIZED HEALTH CARE EDUCATION/TRAINING PROGRAM

## 2023-09-12 PROCEDURE — 86140 C-REACTIVE PROTEIN: CPT | Performed by: STUDENT IN AN ORGANIZED HEALTH CARE EDUCATION/TRAINING PROGRAM

## 2023-09-12 PROCEDURE — 80050 GENERAL HEALTH PANEL: CPT | Performed by: STUDENT IN AN ORGANIZED HEALTH CARE EDUCATION/TRAINING PROGRAM

## 2023-09-12 RX ORDER — EPINEPHRINE 0.3 MG/.3ML
INJECTION SUBCUTANEOUS
Qty: 2 EACH | Refills: 1 | Status: SHIPPED | OUTPATIENT
Start: 2023-09-12

## 2023-09-12 ASSESSMENT — PAIN SCALES - GENERAL: PAINLEVEL: NO PAIN (0)

## 2023-09-12 NOTE — PROGRESS NOTES
Assessment & Plan     Diarrhea, unspecified type  7 weeks of new onset diarrhea - did start after collagen + probiotic supplement but has not resolved with stopping. No other obvious trigger.   Concern with amount, 10+ stools per day, and waking her up at night.  No abdominal pain, melena, or hematochezia.  Sounds like possible infectious source, low suspicion of IBD development or med reaction.    Plan to update basic labs including CBC, CMP, ESR, CRP, and TSH.  We will get stool studies including calprotectin, full enteric bacteria and virus panel, and C. difficile testing.  We will treat if any stool testing comes back positive.  If all testing negative, will need to consider further work-up, including colonoscopy, although did have a normal Cologuard test mid August 2023.  - CBC with Platelets & Differential; Future  - Comprehensive metabolic panel; Future  - TSH with free T4 reflex; Future  - Calprotectin Feces; Future  - CRP inflammation; Future  - Enteric Bacteria and Virus Panel PCR; Future  - C. difficile Toxin B PCR with reflex to C. difficile Antigen and Toxins A/B EIA; Future  - ESR: Erythrocyte sedimentation rate; Future    Allergic reaction, subsequent encounter  History of allergic reaction - refilled epi pen today.   - EPINEPHrine (ANY BX GENERIC EQUIV) 0.3 MG/0.3ML injection 2-pack; INJECT CONTENTS OF 1 PEN AS NEEDED FOR ALLERGIC REACTION      Virginia Green MD  [facility] - [place]ELI Dennis is a 63 year old, presenting for the following health issues:  Diarrhea        9/12/2023     4:14 PM   Additional Questions   Roomed by Shari Summers   Accompanied by None         9/12/2023     4:14 PM   Patient Reported Additional Medications   Patient reports taking the following new medications None       HPI     Diarrhea  Onset/Duration: 7 weeks ago  Description:       Consistency of stool: watery, runny, loose, and explosive       Blood in stool: No       Number of loose  stools past 24 hours: 7  Progression of Symptoms: same and constant  Accompanying signs and symptoms:       Fever: No       Nausea/Vomiting: No       Abdominal pain: YES- Cramping       Weight loss: No       Episodes of constipation: No  History   Ill contacts: No  Recent use of antibiotics: No  Recent travels: No  Recent medication-new or changes(Rx or OTC): No  Precipitating or alleviating factors: None  Therapies tried and outcome: None    Ongoing 7 weeks. Around 8/8/23.   Has always taken collagen (1 scoop in coffee) every morning. Tried probiotic version. Got rid of it since it caused diarrhea. Has never recovered from.   No other med changes.   No one else with diarrhea.   Before noon, going at least five times. 9-10 times per day.   Foul smelling.   No blood.   No weight loss.   No fevers although will occasionally feel that way. '  Waking up at night if large supper.   Cramping with bowel movements.   If large dinner, having to get up at night.   No antibiotics  No camping trips  No travel   No nausea or vomitting  History of c diff years ago and this feels similar        Objective    /76 (BP Location: Left arm, Patient Position: Sitting, Cuff Size: Adult Regular)   Pulse 92   Temp 98.6  F (37  C) (Tympanic)   Wt 74.5 kg (164 lb 5 oz)   SpO2 97%   BMI 27.21 kg/m    Body mass index is 27.21 kg/m .    Physical Exam  Constitutional:       General: She is not in acute distress.     Appearance: Normal appearance. She is not ill-appearing.   HENT:      Mouth/Throat:      Mouth: Mucous membranes are moist.      Pharynx: Oropharynx is clear.      Comments: No ulcerative lesions  Eyes:      General: No scleral icterus.     Conjunctiva/sclera: Conjunctivae normal.   Cardiovascular:      Rate and Rhythm: Normal rate and regular rhythm.      Heart sounds: No murmur heard.  Pulmonary:      Effort: Pulmonary effort is normal.      Breath sounds: Normal breath sounds. No wheezing, rhonchi or rales.   Abdominal:       General: Bowel sounds are normal. There is no distension.      Palpations: Abdomen is soft. There is no mass.      Tenderness: There is no abdominal tenderness. There is no guarding or rebound.   Musculoskeletal:      Right lower leg: No edema.      Left lower leg: No edema.   Skin:     General: Skin is warm and dry.      Findings: No rash.   Neurological:      General: No focal deficit present.      Mental Status: She is alert and oriented to person, place, and time.   Psychiatric:         Mood and Affect: Mood normal.         Behavior: Behavior normal.            Results for orders placed or performed in visit on 09/12/23   CBC with Platelets & Differential     Status: None (In process)    Narrative    The following orders were created for panel order CBC with Platelets & Differential.  Procedure                               Abnormality         Status                     ---------                               -----------         ------                     CBC with platelets and d...[[id number]]                      In process                   Please view results for these tests on the individual orders.

## 2023-09-13 ENCOUNTER — APPOINTMENT (OUTPATIENT)
Dept: LAB | Facility: OTHER | Age: 63
End: 2023-09-13
Payer: COMMERCIAL

## 2023-09-13 LAB

## 2023-09-13 PROCEDURE — 87507 IADNA-DNA/RNA PROBE TQ 12-25: CPT | Performed by: STUDENT IN AN ORGANIZED HEALTH CARE EDUCATION/TRAINING PROGRAM

## 2023-09-13 PROCEDURE — 87493 C DIFF AMPLIFIED PROBE: CPT | Performed by: STUDENT IN AN ORGANIZED HEALTH CARE EDUCATION/TRAINING PROGRAM

## 2023-09-13 PROCEDURE — 83993 ASSAY FOR CALPROTECTIN FECAL: CPT | Performed by: STUDENT IN AN ORGANIZED HEALTH CARE EDUCATION/TRAINING PROGRAM

## 2023-09-15 LAB — CALPROTECTIN STL-MCNT: 142 MG/KG (ref 0–49.9)

## 2023-09-15 NOTE — RESULT ENCOUNTER NOTE
Reviewed unrevealing labs, negative stool studies. Positive stool inflammation. ?microscopic colitis.  Recommend colonoscopy to evaluate further with biopsies.   Added on TTG testing.   Should stop dairy, reduce alcohol, can trial loperamide for symptoms.     Patient personally called and results discussed 9/15/2023 12:12 PM  Virginia Green MD

## 2023-09-20 ENCOUNTER — OFFICE VISIT (OUTPATIENT)
Dept: SURGERY | Facility: OTHER | Age: 63
End: 2023-09-20
Attending: SURGERY
Payer: COMMERCIAL

## 2023-09-20 ENCOUNTER — PREP FOR PROCEDURE (OUTPATIENT)
Dept: SURGERY | Facility: OTHER | Age: 63
End: 2023-09-20

## 2023-09-20 VITALS
HEART RATE: 78 BPM | TEMPERATURE: 98.5 F | HEIGHT: 65 IN | BODY MASS INDEX: 26.49 KG/M2 | DIASTOLIC BLOOD PRESSURE: 58 MMHG | RESPIRATION RATE: 15 BRPM | WEIGHT: 159 LBS | OXYGEN SATURATION: 97 % | SYSTOLIC BLOOD PRESSURE: 122 MMHG

## 2023-09-20 DIAGNOSIS — R19.7 DIARRHEA, UNSPECIFIED TYPE: ICD-10-CM

## 2023-09-20 DIAGNOSIS — R19.7 DIARRHEA: Primary | ICD-10-CM

## 2023-09-20 PROCEDURE — 99203 OFFICE O/P NEW LOW 30 MIN: CPT | Performed by: SURGERY

## 2023-09-20 ASSESSMENT — PAIN SCALES - GENERAL: PAINLEVEL: MILD PAIN (2)

## 2023-09-20 NOTE — PROGRESS NOTES
CLINIC NOTE - CONSULT  2023    Patient:Jeannie Phelps  Referring Physician: [place] Green    Reason for Referral: Change in Bowel Habits (diarrhea)    This is a 63 year old female with a need of a colonoscopy for Change in Bowel Habits (diarrhea).     Personal history of colon cancer : NO   Family history of colon cancer : NO   Personal history of polyps : NO   Family history of polyps : NO   History of Inflammatory Bowel Disease : NO   History of rectal bleeding : NO   Changes in bowel habits : YES   Last colonoscopy : 11 years    Patient states that approximately 8 weeks ago she developed profuse diarrhea.  It is watery in nature.  Her primary care physician did check a C. difficile and for enteric bacteria were both negative.  Her calprotectin is elevated.    Past Medical History:  Past Medical History:   Diagnosis Date    Acute stroke due to ischemia (H) 2023    Bunion 10/12/2017    Overview:  Added automatically from request for surgery [id number]    Dermatitis due to sun 2013    Discoid lupus erythematosus 2008    h/o plaquenil - misdiagnosis    Hyperlipidemia LDL goal <70     Hypertension     Lyme disease 2013    Overview:  Lyme Disease-Deer tick bite 13.  Initially noted a red target lesion on  right lower back       Past Surgical History:  Past Surgical History:   Procedure Laterality Date     SECTION      3x    CHOLECYSTECTOMY      HYSTERECTOMY VAGINAL      Bilateral ovaries remain; Performed for abnormal uterine bleeding. Per [facility] Everywhere Summary.    MANDIBLE SURGERY         Family History History:  Family History   Problem Relation Age of Onset    Breast Cancer Mother         double mastectomy    Hashimoto's thyroiditis Mother     Lung Cancer Father     Alcoholism Sister     Alcoholism Brother     Breast Cancer Maternal Grandmother     Cerebrovascular Disease Paternal Grandfather 60    Breast Cancer Niece        History of  Tobacco Use:  History   Smoking Status    Never   Smokeless Tobacco    Never       Current Medications:  Current Outpatient Medications   Medication Sig Dispense Refill    ASPIRIN LOW DOSE 81 MG chewable tablet       atorvastatin (LIPITOR) 80 MG tablet Take 1 tablet by mouth At Bedtime      clopidogrel (PLAVIX) 75 MG tablet Take 1 tablet (75 mg) by mouth daily 90 tablet 3    fexofenadine (ALLEGRA) 60 MG tablet Take 60 mg by mouth      fluocinolone (SYNALAR) 0.01 % solution APPLY TO THE SCALP ONCE - TWICE DAILY FOR UP TO 4 WEEKS. TAKE A ONE WEEK BREAK, AND REPEAT AS NEEDED      fluocinonide (LIDEX) 0.05 % external gel Apply 1 drop topically as needed      hydrochlorothiazide (HYDRODIURIL) 25 MG tablet Take 1 tablet by mouth daily at 2 pm      ivermectin (SOOLANTRA) 1 % cream APPLY CREAM TOPICALLY TO YOUR ENTIRE FACE AT BEDTIME AFTER WASHING      lisinopril (ZESTRIL) 40 MG tablet Take 1 tablet by mouth daily at 2 pm      Multiple Vitamins-Minerals (MULTIVITAMIN ADULT PO) Take 1 tablet by mouth daily      NIFEdipine ER OSMOTIC (PROCARDIA XL) 30 MG 24 hr tablet Take 1 tablet by mouth once daily 30 tablet 10    tazarotene (TAZORAC) 0.05 % external gel Apply 1 drop topically as needed      EPINEPHrine (ANY BX GENERIC EQUIV) 0.3 MG/0.3ML injection 2-pack INJECT CONTENTS OF 1 PEN AS NEEDED FOR ALLERGIC REACTION (Patient not taking: Reported on 9/20/2023) 2 each 1       Allergies:  Allergies   Allergen Reactions    Rofecoxib Other (See Comments)     Pt had problems with bleeding with urination, Verified in Meditech: Y, Severity in Meditech: S, Vioxx TABS    Hydrocodone Itching     Nerve sensation     Influenza Vaccines      pt's heart races., Verified in Meditech: Y, Severity in Meditech: S    Metoprolol Other (See Comments)     Hot flashes    Amlodipine Other (See Comments) and Palpitations     Hot flashes    Cefuroxime Rash     Cefuroxime Axetil TABS - Rash    Lisinopril Other (See Comments) and Palpitations  "      ROS:  Pertinent items are noted in HPI.  All other systems are negative.    PHYSICAL EXAM:     Vital signs: /58 (BP Location: Right arm, Cuff Size: Adult Regular)   Pulse 78   Temp 98.5  F (36.9  C) (Tympanic)   Resp 15   Ht 1.651 m (5' 5\")   Wt 72.1 kg (159 lb)   SpO2 97%   BMI 26.46 kg/m     Weight: [unfilled]   BMI: Body mass index is 26.46 kg/m .   General: Normal, healthy, cooperative, in no acute distress, alert   Skin: no jaundice   HEENT: PERRLA and EOMI   Neck: supple     ASSESSMENT:      63 year old female with a need of a colonoscopy for Change in Bowel Habits (diarrhea) and an elevated calprotectin.    PLAN:   A colonoscopy will be scheduled.  The procedure with their risks, benefits and alternatives were explained.  Risks include but are not limited to bleeding, perforation, missing lesions, need for additional procedures, reaction to anesthesia.  All the patients questions were answered.  The patient consents to proceed as planned.    We will also complete the rest of a standard stool work-up.  We will also get a CT of the abdomen pelvis.      "

## 2023-09-22 ENCOUNTER — TELEPHONE (OUTPATIENT)
Dept: FAMILY MEDICINE | Facility: OTHER | Age: 63
End: 2023-09-22

## 2023-09-22 NOTE — TELEPHONE ENCOUNTER
I would agree - usually the plavix is only continued for the first 90 days but aspirin indefinitely.

## 2023-09-22 NOTE — TELEPHONE ENCOUNTER
Called and spoke with patient. She stated that she had a follow up for a stroke with Joann Alves in [place]. Doctor Long told her that she can stop taking the Plavix due to doing so well. She was told that her BP and sleep apnea are doing well. Patient wanted to get a second opinion before completely stopping the medication.States she feels skeptical going off of it. She stated she is going to continue taking it until she hears back from us. Please advise.

## 2023-09-22 NOTE — TELEPHONE ENCOUNTER
To: Dr. [place] Green    Patient stopped in to explain that she has been taken off PLAVIX.  Request call from nurse to discuss.     Thank you

## 2023-09-25 NOTE — OR NURSING
Instructed to hold Plavix - 5 days. Instructed to hold NSAIDs, vitamins & supplements starting today 9/25, Continue other prescribed medications up to night before procedure, take nifedipine & hold HTCZ day of procedure.

## 2023-09-26 ENCOUNTER — TELEPHONE (OUTPATIENT)
Dept: MAMMOGRAPHY | Facility: OTHER | Age: 63
End: 2023-09-26

## 2023-09-26 ENCOUNTER — APPOINTMENT (OUTPATIENT)
Dept: LAB | Facility: OTHER | Age: 63
End: 2023-09-26
Attending: STUDENT IN AN ORGANIZED HEALTH CARE EDUCATION/TRAINING PROGRAM
Payer: COMMERCIAL

## 2023-09-26 ENCOUNTER — ANCILLARY PROCEDURE (OUTPATIENT)
Dept: MAMMOGRAPHY | Facility: OTHER | Age: 63
End: 2023-09-26
Attending: STUDENT IN AN ORGANIZED HEALTH CARE EDUCATION/TRAINING PROGRAM
Payer: COMMERCIAL

## 2023-09-26 DIAGNOSIS — Z12.31 BREAST CANCER SCREENING BY MAMMOGRAM: ICD-10-CM

## 2023-09-26 LAB — LACTOFERRIN STL QL IA: POSITIVE

## 2023-09-26 PROCEDURE — 87338 HPYLORI STOOL AG IA: CPT | Performed by: SURGERY

## 2023-09-26 PROCEDURE — 83630 LACTOFERRIN FECAL (QUAL): CPT | Performed by: SURGERY

## 2023-09-26 PROCEDURE — 84376 SUGARS SINGLE QUAL: CPT | Performed by: SURGERY

## 2023-09-26 PROCEDURE — 82705 FATS/LIPIDS FECES QUAL: CPT | Mod: 90 | Performed by: SURGERY

## 2023-09-26 PROCEDURE — 77063 BREAST TOMOSYNTHESIS BI: CPT | Mod: TC | Performed by: STUDENT IN AN ORGANIZED HEALTH CARE EDUCATION/TRAINING PROGRAM

## 2023-09-26 PROCEDURE — 77067 SCR MAMMO BI INCL CAD: CPT | Mod: TC | Performed by: STUDENT IN AN ORGANIZED HEALTH CARE EDUCATION/TRAINING PROGRAM

## 2023-09-26 PROCEDURE — 87177 OVA AND PARASITES SMEARS: CPT | Performed by: SURGERY

## 2023-09-26 PROCEDURE — 87209 SMEAR COMPLEX STAIN: CPT | Performed by: SURGERY

## 2023-09-26 NOTE — TELEPHONE ENCOUNTER
Called and updated patient with information. Patient verbalized understanding that it is okay for her to stop the Plavix.

## 2023-09-27 ENCOUNTER — ANESTHESIA EVENT (OUTPATIENT)
Dept: SURGERY | Facility: HOSPITAL | Age: 63
End: 2023-09-27
Payer: COMMERCIAL

## 2023-09-27 ENCOUNTER — HOSPITAL ENCOUNTER (OUTPATIENT)
Dept: CT IMAGING | Facility: HOSPITAL | Age: 63
Discharge: HOME | End: 2023-09-27
Attending: SURGERY | Admitting: SURGERY
Payer: COMMERCIAL

## 2023-09-27 DIAGNOSIS — R19.7 DIARRHEA, UNSPECIFIED TYPE: ICD-10-CM

## 2023-09-27 LAB
H PYLORI AG STL QL IA: NEGATIVE
Lab: NORMAL
O+P STL MICRO: NEGATIVE
PERFORMING LABORATORY: NORMAL
SPECIMEN STATUS: NORMAL
TEST NAME: NORMAL

## 2023-09-27 PROCEDURE — 250N000011 HC RX IP 250 OP 636: Performed by: RADIOLOGY

## 2023-09-27 PROCEDURE — 74177 CT ABD & PELVIS W/CONTRAST: CPT

## 2023-09-27 RX ORDER — SODIUM CHLORIDE, SODIUM LACTATE, POTASSIUM CHLORIDE, CALCIUM CHLORIDE 600; 310; 30; 20 MG/100ML; MG/100ML; MG/100ML; MG/100ML
INJECTION, SOLUTION INTRAVENOUS CONTINUOUS
Status: CANCELLED | OUTPATIENT
Start: 2023-09-27

## 2023-09-27 RX ORDER — IOPAMIDOL 755 MG/ML
17 INJECTION, SOLUTION INTRAVASCULAR ONCE
Status: COMPLETED | OUTPATIENT
Start: 2023-09-27 | End: 2023-09-27

## 2023-09-27 RX ORDER — ONDANSETRON 4 MG/1
4 TABLET, ORALLY DISINTEGRATING ORAL EVERY 30 MIN PRN
Status: CANCELLED | OUTPATIENT
Start: 2023-09-27

## 2023-09-27 RX ORDER — ONDANSETRON 2 MG/ML
4 INJECTION INTRAMUSCULAR; INTRAVENOUS EVERY 30 MIN PRN
Status: CANCELLED | OUTPATIENT
Start: 2023-09-27

## 2023-09-27 RX ORDER — LABETALOL 20 MG/4 ML (5 MG/ML) INTRAVENOUS SYRINGE
10
Status: CANCELLED | OUTPATIENT
Start: 2023-09-27

## 2023-09-27 RX ORDER — FENTANYL CITRATE 50 UG/ML
50 INJECTION, SOLUTION INTRAMUSCULAR; INTRAVENOUS EVERY 5 MIN PRN
Status: CANCELLED | OUTPATIENT
Start: 2023-09-27

## 2023-09-27 RX ORDER — IOPAMIDOL 755 MG/ML
77 INJECTION, SOLUTION INTRAVASCULAR ONCE
Status: COMPLETED | OUTPATIENT
Start: 2023-09-27 | End: 2023-09-27

## 2023-09-27 RX ORDER — HYDRALAZINE HYDROCHLORIDE 20 MG/ML
2.5-5 INJECTION INTRAMUSCULAR; INTRAVENOUS EVERY 10 MIN PRN
Status: CANCELLED | OUTPATIENT
Start: 2023-09-27

## 2023-09-27 RX ADMIN — IOPAMIDOL 77 ML: 755 INJECTION, SOLUTION INTRAVENOUS at 13:10

## 2023-09-27 RX ADMIN — IOPAMIDOL 17 ML: 755 INJECTION, SOLUTION INTRAVENOUS at 13:10

## 2023-09-27 NOTE — ANESTHESIA PREPROCEDURE EVALUATION
Anesthesia Pre-Procedure Evaluation    Patient: Jeannie Phelps   MRN: [id number] : 1960        Procedure : Procedure(s):  COLONOSCOPY with possible biopsy, possible polypectomy          Past Medical History:   Diagnosis Date     Acute stroke due to ischemia (H) 2023     Bunion 10/12/2017    Overview:  Added automatically from request for surgery [id number]     Dermatitis due to sun 2013     Discoid lupus erythematosus 2008    h/o plaquenil - misdiagnosis     Hyperlipidemia LDL goal <70      Hypertension      Lyme disease 2013    Overview:  Lyme Disease-Deer tick bite 13.  Initially noted a red target lesion on  right lower back      Past Surgical History:   Procedure Laterality Date     BIOPSY BREAST      10 years ago - [facility] / Benign      SECTION      3x     CHOLECYSTECTOMY       HYSTERECTOMY VAGINAL      Bilateral ovaries remain; Performed for abnormal uterine bleeding. Per [facility] Care Everywhere Summary.     MANDIBLE SURGERY        Allergies   Allergen Reactions     Rofecoxib Other (See Comments)     Pt had problems with bleeding with urination, Verified in Meditech: Y, Severity in Meditech: S, Vioxx TABS     Hydrocodone Itching     Nerve sensation      Influenza Vaccines      pt's heart races., Verified in Meditech: Y, Severity in Meditech: S     Metoprolol Other (See Comments)     Hot flashes     Amlodipine Other (See Comments) and Palpitations     Hot flashes     Cefuroxime Rash     Cefuroxime Axetil TABS - Rash     Lisinopril Other (See Comments) and Palpitations      Social History     Tobacco Use     Smoking status: Never     Smokeless tobacco: Never   Substance Use Topics     Alcohol use: Yes     Comment: 2 night      Wt Readings from Last 1 Encounters:   23 72.1 kg (159 lb)        Anesthesia Evaluation   Pt has had prior anesthetic. Type: General and Regional.    No history of anesthetic complications  "      ROS/MED HX  ENT/Pulmonary:     (+) sleep apnea, uses CPAP,                                     Neurologic: Comment: Back to normal, no deficits noted per patient    (+)          CVA, date: 3/2023 & 5/2023, without deficits,                    Cardiovascular: Comment: Diastolic dysfunction  Monitor 04/23:  Conclusion:    1. Monitor showed predominately: normal sinus rhythm  2. Tachycardia: 8 % of the time  3. Bradycardia: 2 % of the time  4. Ventricular ectopy: occasional (1-5%) with 1 % PVC present.  5. Supraventricular ectopy: rare (<1%) PAC present.  6. Atrial fibrillation: none  7. Patient triggered events: There were 4 manually detected events. Two accidental pushes. Two events labeled \"tired/fatigued\" correlated with sinus rhythm.  8. Auto triggered events: non sustained VT; 2 episodes, one lasting 11 seconds and one lasting 4 beats.     Echocardiogram:03/23  Interpretation Summary  1. Normal size left ventricle with normal systolic function.  2. No significant valvular regurgitation or stenosis.  3. No vegetation or thrombi seen.  4. No thrombus seen in the left atrial appendage.  5. There is no evidence of an atrial septal defect or patent foramen ovale by agitated saline injection or color flow doppler.  6. No atheromas seen in the aorta.    Stress echo 08/23:  Interpretation Summary  1. Negative exercise stress echocardiogram for inducible ischemia.  2. Normal size left ventricle with normal systolic function. EF 55-60%.  3. No significant valvular regurgitation or stenosis.  4. Good functional capacity for gender and age with appropriate heart rate and blood pressure response to exercise.        (+) Dyslipidemia hypertension- -   -  - -   Taking blood thinners Pt has not received instructions:                                  METS/Exercise Tolerance:     Hematologic:  - neg hematologic  ROS     Musculoskeletal: Comment: Fibromuscular dysplasia  (+)  arthritis,             GI/Hepatic:     (+)        " bowel prep,            Renal/Genitourinary: Comment: Renal artery stenosis      Endo:  - neg endo ROS     Psychiatric/Substance Use:  - neg psychiatric ROS     Infectious Disease:  - neg infectious disease ROS     Malignancy:  - neg malignancy ROS     Other:  - neg other ROS          Physical Exam    Airway        Mallampati: II   TM distance: > 3 FB    Mouth opening: > 3 cm    Respiratory Devices and Support         Dental       (+) Minor Abnormalities - some fillings, tiny chips      Cardiovascular          Rhythm and rate: regular and normal     Pulmonary   pulmonary exam normal        breath sounds clear to auscultation       OUTSIDE LABS:  CBC:   Lab Results   Component Value Date    WBC 6.0 09/12/2023    WBC 6.0 05/12/2023    HGB 12.4 09/12/2023    HGB 14.1 05/12/2023    HCT 36.8 09/12/2023    HCT 40.2 05/12/2023     09/12/2023     05/12/2023     BMP:   Lab Results   Component Value Date     09/12/2023     (L) 08/02/2023    POTASSIUM 3.8 09/12/2023    POTASSIUM 3.7 08/02/2023    CHLORIDE 102 09/12/2023    CHLORIDE 96 (L) 08/02/2023    CO2 27 09/12/2023    CO2 26 08/02/2023    BUN 18.0 09/12/2023    BUN 12.4 08/02/2023    CR 0.74 09/12/2023    CR 0.70 08/02/2023    GLC 97 09/12/2023     (H) 08/02/2023     COAGS:   Lab Results   Component Value Date    PTT 28 05/12/2023    INR 0.98 05/12/2023     POC: No results found for: BGM, HCG, HCGS  HEPATIC:   Lab Results   Component Value Date    ALBUMIN 4.8 09/12/2023    PROTTOTAL 7.5 09/12/2023    ALT 30 09/12/2023    AST 32 09/12/2023    ALKPHOS 86 09/12/2023    BILITOTAL 0.5 09/12/2023     OTHER:   Lab Results   Component Value Date    ERIKA 9.6 09/12/2023    TSH 2.68 09/12/2023    SED 13 09/12/2023       Anesthesia Plan    ASA Status:  3    NPO Status:  NPO Appropriate    Anesthesia Type: MAC.     - Reason for MAC: straight local not clinically adequate              Consents    Anesthesia Plan(s) and associated risks, benefits, and  realistic alternatives discussed. Questions answered and patient/representative(s) expressed understanding.     - Discussed: Risks, Benefits and Alternatives for BOTH SEDATION and the PROCEDURE were discussed     - Discussed with:  Patient      - Extended Intubation/Ventilatory Support Discussed: No.      - Patient is DNR/DNI Status: No     Use of blood products discussed: No .     Postoperative Care            Comments:    Other Comments: Keep BP within 20 percent baseline 108/71.  Spoke to Peter, ok to proceed.             AILEEN Awad CRNA

## 2023-09-29 LAB
FAT STL QL: NORMAL
MISCELLANEOUS TEST 1 (ARUP): NORMAL
NEUTRAL FAT STL QL: NORMAL

## 2023-10-02 ENCOUNTER — HOSPITAL ENCOUNTER (OUTPATIENT)
Facility: HOSPITAL | Age: 63
End: 2023-10-02
Attending: SURGERY | Admitting: SURGERY
Payer: COMMERCIAL

## 2023-10-02 ENCOUNTER — ANESTHESIA (OUTPATIENT)
Dept: SURGERY | Facility: HOSPITAL | Age: 63
End: 2023-10-02
Payer: COMMERCIAL

## 2023-10-02 ENCOUNTER — ANESTHESIA EVENT (OUTPATIENT)
Dept: SURGERY | Facility: HOSPITAL | Age: 63
End: 2023-10-02
Payer: COMMERCIAL

## 2023-10-02 ENCOUNTER — HOSPITAL ENCOUNTER (OUTPATIENT)
Facility: HOSPITAL | Age: 63
Discharge: STILL ADMITTED OR ADMINISTRATIVE | End: 2023-10-02
Attending: SURGERY | Admitting: SURGERY
Payer: COMMERCIAL

## 2023-10-02 VITALS
OXYGEN SATURATION: 97 % | SYSTOLIC BLOOD PRESSURE: 130 MMHG | DIASTOLIC BLOOD PRESSURE: 64 MMHG | RESPIRATION RATE: 16 BRPM | HEART RATE: 64 BPM

## 2023-10-02 VITALS
OXYGEN SATURATION: 100 % | SYSTOLIC BLOOD PRESSURE: 143 MMHG | WEIGHT: 157 LBS | DIASTOLIC BLOOD PRESSURE: 77 MMHG | HEIGHT: 65 IN | HEART RATE: 62 BPM | RESPIRATION RATE: 18 BRPM | BODY MASS INDEX: 26.16 KG/M2

## 2023-10-02 DIAGNOSIS — R19.7 DIARRHEA, UNSPECIFIED TYPE: Primary | ICD-10-CM

## 2023-10-02 PROCEDURE — 45380 COLONOSCOPY AND BIOPSY: CPT | Performed by: NURSE ANESTHETIST, CERTIFIED REGISTERED

## 2023-10-02 PROCEDURE — 999N000141 HC STATISTIC PRE-PROCEDURE NURSING ASSESSMENT: Performed by: SURGERY

## 2023-10-02 PROCEDURE — 258N000003 HC RX IP 258 OP 636: Performed by: NURSE ANESTHETIST, CERTIFIED REGISTERED

## 2023-10-02 PROCEDURE — 45380 COLONOSCOPY AND BIOPSY: CPT | Performed by: SURGERY

## 2023-10-02 PROCEDURE — 250N000011 HC RX IP 250 OP 636: Performed by: NURSE ANESTHETIST, CERTIFIED REGISTERED

## 2023-10-02 PROCEDURE — 88305 TISSUE EXAM BY PATHOLOGIST: CPT | Mod: TC | Performed by: SURGERY

## 2023-10-02 PROCEDURE — 88305 TISSUE EXAM BY PATHOLOGIST: CPT | Mod: 26 | Performed by: PATHOLOGY

## 2023-10-02 PROCEDURE — 710N000012 HC RECOVERY PHASE 2, PER MINUTE: Performed by: SURGERY

## 2023-10-02 PROCEDURE — 360N000075 HC SURGERY LEVEL 2, PER MIN: Performed by: SURGERY

## 2023-10-02 PROCEDURE — 370N000017 HC ANESTHESIA TECHNICAL FEE, PER MIN: Performed by: SURGERY

## 2023-10-02 PROCEDURE — 272N000001 HC OR GENERAL SUPPLY STERILE: Performed by: SURGERY

## 2023-10-02 PROCEDURE — 250N000009 HC RX 250: Performed by: NURSE ANESTHETIST, CERTIFIED REGISTERED

## 2023-10-02 RX ORDER — HYDRALAZINE HYDROCHLORIDE 20 MG/ML
2.5-5 INJECTION INTRAMUSCULAR; INTRAVENOUS EVERY 10 MIN PRN
Status: DISCONTINUED | OUTPATIENT
Start: 2023-10-02 | End: 2024-03-13

## 2023-10-02 RX ORDER — ONDANSETRON 2 MG/ML
4 INJECTION INTRAMUSCULAR; INTRAVENOUS EVERY 30 MIN PRN
Status: DISCONTINUED | OUTPATIENT
Start: 2023-10-02 | End: 2024-03-13

## 2023-10-02 RX ORDER — LIDOCAINE HYDROCHLORIDE 20 MG/ML
INJECTION, SOLUTION INFILTRATION; PERINEURAL PRN
Status: DISCONTINUED | OUTPATIENT
Start: 2023-10-02 | End: 2023-10-02

## 2023-10-02 RX ORDER — PROPOFOL 10 MG/ML
INJECTION, EMULSION INTRAVENOUS PRN
Status: DISCONTINUED | OUTPATIENT
Start: 2023-10-02 | End: 2023-10-02

## 2023-10-02 RX ORDER — OXYCODONE HYDROCHLORIDE 5 MG/1
5 TABLET ORAL
Status: CANCELLED | OUTPATIENT
Start: 2023-10-02

## 2023-10-02 RX ORDER — LIDOCAINE 40 MG/G
CREAM TOPICAL
Status: DISCONTINUED | OUTPATIENT
Start: 2023-10-02 | End: 2023-10-02 | Stop reason: HOSPADM

## 2023-10-02 RX ORDER — SODIUM CHLORIDE, SODIUM LACTATE, POTASSIUM CHLORIDE, CALCIUM CHLORIDE 600; 310; 30; 20 MG/100ML; MG/100ML; MG/100ML; MG/100ML
INJECTION, SOLUTION INTRAVENOUS CONTINUOUS
Status: DISCONTINUED | OUTPATIENT
Start: 2023-10-02 | End: 2023-10-02 | Stop reason: HOSPADM

## 2023-10-02 RX ORDER — SODIUM CHLORIDE, SODIUM LACTATE, POTASSIUM CHLORIDE, CALCIUM CHLORIDE 600; 310; 30; 20 MG/100ML; MG/100ML; MG/100ML; MG/100ML
INJECTION, SOLUTION INTRAVENOUS CONTINUOUS
Status: DISCONTINUED | OUTPATIENT
Start: 2023-10-02 | End: 2024-03-13

## 2023-10-02 RX ORDER — LABETALOL 20 MG/4 ML (5 MG/ML) INTRAVENOUS SYRINGE
10
Status: DISCONTINUED | OUTPATIENT
Start: 2023-10-02 | End: 2024-03-13

## 2023-10-02 RX ORDER — ONDANSETRON 4 MG/1
4 TABLET, ORALLY DISINTEGRATING ORAL EVERY 30 MIN PRN
Status: DISCONTINUED | OUTPATIENT
Start: 2023-10-02 | End: 2024-03-13

## 2023-10-02 RX ORDER — FENTANYL CITRATE 50 UG/ML
50 INJECTION, SOLUTION INTRAMUSCULAR; INTRAVENOUS EVERY 5 MIN PRN
Status: DISCONTINUED | OUTPATIENT
Start: 2023-10-02 | End: 2024-03-13

## 2023-10-02 RX ADMIN — PROPOFOL 60 MG: 10 INJECTION, EMULSION INTRAVENOUS at 15:33

## 2023-10-02 RX ADMIN — SODIUM CHLORIDE, POTASSIUM CHLORIDE, SODIUM LACTATE AND CALCIUM CHLORIDE: 600; 310; 30; 20 INJECTION, SOLUTION INTRAVENOUS at 14:56

## 2023-10-02 RX ADMIN — PROPOFOL 20 MG: 10 INJECTION, EMULSION INTRAVENOUS at 15:43

## 2023-10-02 RX ADMIN — SODIUM CHLORIDE, POTASSIUM CHLORIDE, SODIUM LACTATE AND CALCIUM CHLORIDE: 600; 310; 30; 20 INJECTION, SOLUTION INTRAVENOUS at 12:32

## 2023-10-02 RX ADMIN — PROPOFOL 30 MG: 10 INJECTION, EMULSION INTRAVENOUS at 15:40

## 2023-10-02 RX ADMIN — LIDOCAINE HYDROCHLORIDE 40 MG: 20 INJECTION, SOLUTION INFILTRATION; PERINEURAL at 14:46

## 2023-10-02 RX ADMIN — PROPOFOL 30 MG: 10 INJECTION, EMULSION INTRAVENOUS at 14:51

## 2023-10-02 RX ADMIN — PROPOFOL 20 MG: 10 INJECTION, EMULSION INTRAVENOUS at 15:35

## 2023-10-02 RX ADMIN — PROPOFOL 20 MG: 10 INJECTION, EMULSION INTRAVENOUS at 14:49

## 2023-10-02 RX ADMIN — PROPOFOL 50 MG: 10 INJECTION, EMULSION INTRAVENOUS at 15:38

## 2023-10-02 RX ADMIN — PROPOFOL 30 MG: 10 INJECTION, EMULSION INTRAVENOUS at 14:54

## 2023-10-02 RX ADMIN — PROPOFOL 70 MG: 10 INJECTION, EMULSION INTRAVENOUS at 14:47

## 2023-10-02 RX ADMIN — PROPOFOL 20 MG: 10 INJECTION, EMULSION INTRAVENOUS at 15:45

## 2023-10-02 ASSESSMENT — ACTIVITIES OF DAILY LIVING (ADL)
ADLS_ACUITY_SCORE: 35
ADLS_ACUITY_SCORE: 35

## 2023-10-02 NOTE — ANESTHESIA POSTPROCEDURE EVALUATION
Patient: Jeannie Phelps    Procedure: Procedure(s):  COLONOSCOPY with possible biopsy, possible polypectomy       Anesthesia Type:  MAC    Note:  Disposition: Outpatient   Postop Pain Control: Uneventful            Sign Out: Well controlled pain   PONV: No   Neuro/Psych: Uneventful            Sign Out: Acceptable/Baseline neuro status   Airway/Respiratory: Uneventful            Sign Out: Acceptable/Baseline resp. status   CV/Hemodynamics: Uneventful            Sign Out: Acceptable CV status; No obvious hypovolemia; No obvious fluid overload   Other NRE: NONE   DID A NON-ROUTINE EVENT OCCUR? No       Last vitals:  Vitals Value Taken Time   BP     Temp     Pulse     Resp     SpO2         Electronically Signed By: AILEEN Awad CRNA  October 2, 2023  3:24 PM

## 2023-10-02 NOTE — ANESTHESIA CARE TRANSFER NOTE
Patient: Jeannie Phelps    Procedure: Procedure(s):  COLONOSCOPY with biopsies       Diagnosis: Diarrhea, unspecified type [R19.7]  Diagnosis Additional Information: No value filed.    Anesthesia Type:   MAC     Note:    Oropharynx: spontaneously breathing  Level of Consciousness: drowsy  Oxygen Supplementation: room air    Independent Airway: airway patency satisfactory and stable  Dentition: dentition unchanged  Vital Signs Stable: post-procedure vital signs reviewed and stable  Report to RN Given: handoff report given  Patient transferred to: Phase II    Handoff Report: Identifed the Patient, Identified the Reponsible Provider, Reviewed the pertinent medical history, Discussed the surgical course, Reviewed Intra-OP anesthesia mangement and issues during anesthesia, Set expectations for post-procedure period and Allowed opportunity for questions and acknowledgement of understanding  Vitals:  Vitals Value Taken Time   BP     Temp     Pulse     Resp     SpO2         Electronically Signed By: Mike Fleming  October 2, 2023  3:51 PM

## 2023-10-02 NOTE — H&P
HISTORY AND PHYSICAL - ENDOSCOPY   10/2/2023    Patient : Jeannie Phelps    Planned Procedures : Colonoscopy    This is a 63 year old female with a need for a colonoscopy for Change in Bowel Habits (diarrhea).      Last colonoscopy : 12 years ago  Family history of colon cancer : NO  Family history of colon polyps : NO  Personal history of colon cancer : NO  Personal history of colon polyps : NO  Rectal bleeding : NO  Changes in bowel habits :YES  Personal history of inflammatory bowel disease : NO    Past Medical History:  Past Medical History:   Diagnosis Date    Acute stroke due to ischemia (H) 2023    Bunion 10/12/2017    Overview:  Added automatically from request for surgery [id number]    Dermatitis due to sun 2013    Discoid lupus erythematosus 2008    h/o plaquenil - misdiagnosis    Hyperlipidemia LDL goal <70     Hypertension     Lyme disease 2013    Overview:  Lyme Disease-Deer tick bite 13.  Initially noted a red target lesion on  right lower back       Past Surgical History:  Past Surgical History:   Procedure Laterality Date    BIOPSY BREAST      10 years ago - [facility] / Benign     SECTION      3x    CHOLECYSTECTOMY      HYSTERECTOMY VAGINAL      Bilateral ovaries remain; Performed for abnormal uterine bleeding. Per [facility] Care Everywhere Summary.    MANDIBLE SURGERY         Family History History:  Family History   Problem Relation Age of Onset    Breast Cancer Mother         double mastectomy    Hashimoto's thyroiditis Mother     Lung Cancer Father     Alcoholism Sister     Breast Cancer Maternal Grandmother     Cerebrovascular Disease Paternal Grandfather 60    Alcoholism Brother     Breast Cancer Niece 30        double mastectomy       History of Tobacco Use:  History   Smoking Status    Never   Smokeless Tobacco    Never       Current Medications:  No current outpatient medications on file.        Allergies:  Allergies   Allergen Reactions    Rofecoxib Other (See Comments)     Pt had problems with bleeding with urination, Verified in Meditech: Y, Severity in Meditech: S, Vioxx TABS    Hydrocodone Itching     Nerve sensation     Influenza Vaccines      pt's heart races., Verified in Meditech: Y, Severity in Meditech: S    Metoprolol Other (See Comments)     Hot flashes    Amlodipine Other (See Comments) and Palpitations     Hot flashes    Cefuroxime Rash     Cefuroxime Axetil TABS - Rash    Lisinopril Other (See Comments) and Palpitations       ROS:  Constitutional: negative  Eyes: negative  Ears, nose, mouth, throat, and face: negative  Respiratory: negative  Cardiovascular: negative  Gastrointestinal: positive for diarrhea  Genitourinary:negative  Integument/breast: negative  Hematologic/lymphatic: negative  Musculoskeletal: negative  Neurological: positive for history of CVA in March and May  Behavioral/Psych: negative  Endocrine: negative  Allergic/Immunologic: negative    PHYSICAL EXAM:     Vital signs: There were no vitals taken for this visit.   BMI: There is no height or weight on file to calculate BMI.   General: Normal, healthy, cooperative, in no acute distress, alert   Skin: no rashes   Lungs: clear to auscultation   CV: Regular rate and rhythm   Abdominal: non-distended, soft, non-tender to palpation   Extremities: No cyanosis, clubbing or edema noted bilaterally in Upper and Lower Extremities   Neurological: without deficit    Assessment:   63 year old female with need of a colonoscopy for Change in Bowel Habits (diarrhea):    Plan:   Will proceed with doing a colonoscopy.      The risks, benefits, and alternatives to the planned procedure were fully discussed with the patient and/or the patient's representative(s). The risks of bleeding, infection, death, missing pathology, the need for additional procedures intra-operatively, the possible need for intra-operative consults, the possible  need for transfusion therapy, cardiopulmonary compromise, the possible need for additional surgery for a complication were discussed with the patient and/or the patient's representative(s). The patient's and/or patient's representative(s) questions were addressed and answered. Informed consent was obtained from the patient and/or the patient's representative(s). The patient and/or the patient's representative(s) consent to proceed.    Specific risks:  Risks include but are not limited to bleeding, perforation, missing lesions, need for additional procedures, reaction to anesthesia.  All the patients questions were answered.  The patient consents to proceed.  The procedures will be scheduled.

## 2023-10-02 NOTE — DISCHARGE INSTRUCTIONS
Post-Anesthesia Patient Instructions    IMMEDIATELY FOLLOWING SURGERY:  Do not drive or operate machinery for the first twenty four hours after surgery.  Do not make any important decisions for twenty four hours after surgery or while taking narcotic pain medications or sedatives.  If you develop intractable nausea and vomiting or a severe headache please notify your doctor immediately.    FOLLOW-UP:  Please make an appointment with your surgeon as instructed. You do not need to follow up with anesthesia unless specifically instructed to do so.    WOUND CARE INSTRUCTIONS (if applicable):  Keep a dry clean dressing on the anesthesia/puncture wound site if there is drainage.  Once the wound has quit draining you may leave it open to air.  Generally you should leave the bandage intact for twenty four hours unless there is drainage.  If the epidural site drains for more than 36-48 hours please call the anesthesia department.    QUESTIONS?:  Please feel free to call your physician or the hospital  if you have any questions, and they will be happy to assist you.       Colonoscopy: What to Expect at Home  Your Recovery  After a colonoscopy, you'll stay at the clinic until you wake up. Then you can go home. But you'll need to arrange for a ride. Your doctor will tell you when you can eat and do your other usual activities.  Your doctor will talk to you about when you'll need your next colonoscopy. Your doctor can help you decide how often you need to be checked. This will depend on the results of your test and your risk for colorectal cancer.  After the test, you may be bloated or have gas pains. You may need to pass gas. If a biopsy was done or a polyp was removed, you may have streaks of blood in your stool (feces) for a few days. Problems such as heavy rectal bleeding may not occur until several weeks after the test. This isn't common. But it can happen after polyps are removed.  This care sheet gives you a  general idea about how long it will take for you to recover. But each person recovers at a different pace. Follow the steps below to get better as quickly as possible.  How can you care for yourself at home?  Activity    Rest when you feel tired.     You can do your normal activities when it feels okay to do so.   Diet    Follow your doctor's directions for eating.     Unless your doctor has told you not to, drink plenty of fluids. This helps to replace the fluids that were lost during the colon prep.     Do not drink alcohol.   Medicines    Your doctor will tell you if and when you can restart your medicines. You will also be given instructions about taking any new medicines.     If you stopped taking aspirin or some other blood thinner, your doctor will tell you when to start taking it again.     If polyps were removed or a biopsy was done during the test, your doctor may tell you not to take aspirin or other anti-inflammatory medicines for a few days. These include ibuprofen (Advil, Motrin) and naproxen (Aleve).   Other instructions    For your safety, do not drive or operate machinery until the medicine wears off and you can think clearly. Your doctor may tell you not to drive or operate machinery until the day after your test.     Do not sign legal documents or make major decisions until the medicine wears off and you can think clearly. The anesthesia can make it hard for you to fully understand what you are agreeing to.   Follow-up care is a key part of your treatment and safety. Be sure to make and go to all appointments, and call your doctor if you are having problems. It's also a good idea to know your test results and keep a list of the medicines you take.  When should you call for help?   Call 911 anytime you think you may need emergency care. For example, call if:    You passed out (lost consciousness).     You pass maroon or bloody stools.     You have trouble breathing.   Call your doctor now or seek  "immediate medical care if:    You have pain that does not get better after you take pain medicine.     You are sick to your stomach or cannot drink fluids.     You have new or worse belly pain.     You have blood in your stools.     You have a fever.     You cannot pass stools or gas.   Watch closely for changes in your health, and be sure to contact your doctor if you have any problems.  Where can you learn more?  Go to https://www.[email or web address]me.net/patiented  Enter E264 in the search box to learn more about \"Colonoscopy: What to Expect at Home.\"  Current as of: March 1, 2023               Content Version: 13.7    1752-2471 "[employer]".   Care instructions adapted under license by your healthcare professional. If you have questions about a medical condition or this instruction, always ask your healthcare professional. "[employer]" disclaims any warranty or liability for your use of this information.      "

## 2023-10-02 NOTE — ANESTHESIA CARE TRANSFER NOTE
Patient: Jeannie Phelps    Procedure: Procedure(s):  COLONOSCOPY with possible biopsy, possible polypectomy       Diagnosis: Diarrhea [R19.7]  Diagnosis Additional Information: No value filed.    Anesthesia Type:   MAC     Note:    Oropharynx: oropharynx clear of all foreign objects and spontaneously breathing  Level of Consciousness: awake  Oxygen Supplementation: room air      Dentition: dentition unchanged  Vital Signs Stable: post-procedure vital signs reviewed and stable  Report to RN Given: handoff report given  Patient transferred to: Phase II    Handoff Report: Identifed the Patient, Identified the Reponsible Provider, Reviewed the pertinent medical history, Discussed the surgical course, Reviewed Intra-OP anesthesia mangement and issues during anesthesia, Set expectations for post-procedure period and Allowed opportunity for questions and acknowledgement of understanding      Vitals:  Vitals Value Taken Time   BP     Temp     Pulse     Resp     SpO2         Electronically Signed By: Mike Fleming  October 2, 2023  3:10 PM

## 2023-10-02 NOTE — ANESTHESIA PREPROCEDURE EVALUATION
Anesthesia Pre-Procedure Evaluation    Patient: Jeannie Phelps   MRN: [id number] : 1960        Procedure : Procedure(s):  COLONOSCOPY          Past Medical History:   Diagnosis Date     Acute stroke due to ischemia (H) 2023     Bunion 10/12/2017    Overview:  Added automatically from request for surgery [id number]     Dermatitis due to sun 2013     Discoid lupus erythematosus 2008    h/o plaquenil - misdiagnosis     Hyperlipidemia LDL goal <70      Hypertension      Lyme disease 2013    Overview:  Lyme Disease-Deer tick bite 13.  Initially noted a red target lesion on  right lower back      Past Surgical History:   Procedure Laterality Date     BIOPSY BREAST      10 years ago - [facility] / Benign      SECTION      3x     CHOLECYSTECTOMY       HYSTERECTOMY VAGINAL      Bilateral ovaries remain; Performed for abnormal uterine bleeding. Per [facility] Everywhere Summary.     MANDIBLE SURGERY        Allergies   Allergen Reactions     Rofecoxib Other (See Comments)     Pt had problems with bleeding with urination, Verified in Meditech: Y, Severity in Meditech: S, Vioxx TABS     Hydrocodone Itching     Nerve sensation      Influenza Vaccines      pt's heart races., Verified in Meditech: Y, Severity in Meditech: S     Metoprolol Other (See Comments)     Hot flashes     Amlodipine Other (See Comments) and Palpitations     Hot flashes     Cefuroxime Rash     Cefuroxime Axetil TABS - Rash     Lisinopril Other (See Comments) and Palpitations      Social History     Tobacco Use     Smoking status: Never     Smokeless tobacco: Never   Substance Use Topics     Alcohol use: Yes     Comment: 2 night      Wt Readings from Last 1 Encounters:   23 72.1 kg (159 lb)        Anesthesia Evaluation   Pt has had prior anesthetic. Type: General and Regional.    No history of anesthetic complications       ROS/MED HX  ENT/Pulmonary:     (+) sleep  "apnea, uses CPAP,                                     Neurologic: Comment: Back to normal, no deficits noted per patient    (+)          CVA, date: 3/2023 & 5/2023, without deficits,                    Cardiovascular: Comment: Diastolic dysfunction  Monitor 04/23:  Conclusion:    1. Monitor showed predominately: normal sinus rhythm  2. Tachycardia: 8 % of the time  3. Bradycardia: 2 % of the time  4. Ventricular ectopy: occasional (1-5%) with 1 % PVC present.  5. Supraventricular ectopy: rare (<1%) PAC present.  6. Atrial fibrillation: none  7. Patient triggered events: There were 4 manually detected events. Two accidental pushes. Two events labeled \"tired/fatigued\" correlated with sinus rhythm.  8. Auto triggered events: non sustained VT; 2 episodes, one lasting 11 seconds and one lasting 4 beats.     Echocardiogram:03/23  Interpretation Summary  1. Normal size left ventricle with normal systolic function.  2. No significant valvular regurgitation or stenosis.  3. No vegetation or thrombi seen.  4. No thrombus seen in the left atrial appendage.  5. There is no evidence of an atrial septal defect or patent foramen ovale by agitated saline injection or color flow doppler.  6. No atheromas seen in the aorta.    Stress echo 08/23:  Interpretation Summary  1. Negative exercise stress echocardiogram for inducible ischemia.  2. Normal size left ventricle with normal systolic function. EF 55-60%.  3. No significant valvular regurgitation or stenosis.  4. Good functional capacity for gender and age with appropriate heart rate and blood pressure response to exercise.        (+) Dyslipidemia hypertension- -   -  - -   Taking blood thinners Pt has not received instructions:                                  METS/Exercise Tolerance: >4 METS    Hematologic:  - neg hematologic  ROS     Musculoskeletal: Comment: Fibromuscular dysplasia  (+)  arthritis,             GI/Hepatic:     (+)        bowel prep,          "   Renal/Genitourinary: Comment: Renal artery stenosis      Endo:  - neg endo ROS     Psychiatric/Substance Use:  - neg psychiatric ROS     Infectious Disease:  - neg infectious disease ROS     Malignancy:  - neg malignancy ROS     Other:  - neg other ROS          Physical Exam    Airway        Mallampati: II   TM distance: > 3 FB   Neck ROM: full   Mouth opening: > 3 cm    Respiratory Devices and Support         Dental       (+) Minor Abnormalities - some fillings, tiny chips      Cardiovascular   cardiovascular exam normal          Pulmonary   pulmonary exam normal              OUTSIDE LABS:  CBC:   Lab Results   Component Value Date    WBC 6.0 09/12/2023    WBC 6.0 05/12/2023    HGB 12.4 09/12/2023    HGB 14.1 05/12/2023    HCT 36.8 09/12/2023    HCT 40.2 05/12/2023     09/12/2023     05/12/2023     BMP:   Lab Results   Component Value Date     09/12/2023     (L) 08/02/2023    POTASSIUM 3.8 09/12/2023    POTASSIUM 3.7 08/02/2023    CHLORIDE 102 09/12/2023    CHLORIDE 96 (L) 08/02/2023    CO2 27 09/12/2023    CO2 26 08/02/2023    BUN 18.0 09/12/2023    BUN 12.4 08/02/2023    CR 0.74 09/12/2023    CR 0.70 08/02/2023    GLC 97 09/12/2023     (H) 08/02/2023     COAGS:   Lab Results   Component Value Date    PTT 28 05/12/2023    INR 0.98 05/12/2023     POC: No results found for: BGM, HCG, HCGS  HEPATIC:   Lab Results   Component Value Date    ALBUMIN 4.8 09/12/2023    PROTTOTAL 7.5 09/12/2023    ALT 30 09/12/2023    AST 32 09/12/2023    ALKPHOS 86 09/12/2023    BILITOTAL 0.5 09/12/2023     OTHER:   Lab Results   Component Value Date    ERIKA 9.6 09/12/2023    TSH 2.68 09/12/2023    SED 13 09/12/2023       Anesthesia Plan    ASA Status:  3       Anesthesia Type: MAC.     - Reason for MAC: straight local not clinically adequate   Induction: Intravenous, Propofol.   Maintenance: Balanced.        Consents    Anesthesia Plan(s) and associated risks, benefits, and realistic alternatives  discussed. Questions answered and patient/representative(s) expressed understanding.     - Discussed: Risks, Benefits and Alternatives for BOTH SEDATION and the PROCEDURE were discussed     - Discussed with:  Patient      - Extended Intubation/Ventilatory Support Discussed: No.      - Patient is DNR/DNI Status: No     Use of blood products discussed: No .     Postoperative Care            Comments:    Other Comments: Risks and benefits of MAC anesthetic discussed including dental damage, aspiration, loss of airway, conversion to general anesthetic, CV complications, MI, stroke, death. Pt wishes to proceed.     Dr. Pillai taking back because he forgot to do biopsies on the first time.             AILEEN Awad CRNA

## 2023-10-02 NOTE — OP NOTE
REPORT OF OPERATION  DATE OF PROCEDURE: 10/2/2023    PATIENT: Jeannie Phelps    SURGERY PERFORMED: Colonoscopy with biopsies    PREOPERATIVE DIAGNOSIS: Change in Bowel Habits (diarrhea)    POSTOPERATIVE DIAGNOSIS:    Same   Normal colonoscopy   Diverticulosis was not identified.   Hemorrhoids  were  identified.    SURGEON: Jonathan Pillai MD    ASSISTANTS: None    ANESTHESIA: Monitored Anesthesia Care    COMPLICATIONS: None apparent    TRANSFUSIONS: None    TISSUE TO PATHOLOGY: Colon biopsies     FINDINGS: Normal colonoscopy.  Diverticulosis was not identified.  Hemorrhoids  were  identified.    INDICATIONS: This is a 63 year old female in need of a colonoscopy for Change in Bowel Habits (diarrhea).  The patient had just returned from a colonoscopy which time no biopsies were taken.  Because of her diarrhea and the question of subacute colitis I felt it is necessary to take the patient back to the operating room for biopsies of her colon.  The patient will be taken back to the endoscopy suite for that procedure.    DESCRIPTIONS OF PROCEDURE IN DETAIL: After consent was obtained the patient was taken to the endoscopy suite and placed in the left lateral decubitus position.  The patient was identified and the correct patient was confirmed.  Monitored Anesthesia Care was given.  A time out was performed verifying the correct patient and the correct procedure.  The entire operative team was in agreement.  All necessary equipment and supplies were in the room.    Rectal exam was performed and no lesions of the anal canal were noted.  The colonoscope was inserted into the anus and passed without difficulty to the cecum.  The cecum was identified by the ileocecal valve, the coalescence of the tinea and the appendiceal orifice.  Upon withdrawal all walls of the colon were visualized.  There were no polyps, masses or evidence of colitis seen.  Random biopsies were taken.  Diverticulosis was not seen.  Upon reaching  the rectum the scope was retroflexed and internal hemorrhoids  were  seen.  The scope was straightened back out and removed from the patient.  The patient was then taken to the recovery room in stable condition tolerating the procedure well.      Prep: fair    Withdrawal time was 6 minutes.    It is recommended that the patient have another colonoscopy in 10 years.

## 2023-10-02 NOTE — OP NOTE
REPORT OF OPERATION  DATE OF PROCEDURE: 10/2/2023    PATIENT: Jeannie Phelps    SURGERY PERFORMED: Colonoscopy    PREOPERATIVE DIAGNOSIS: Change in Bowel Habits (diarrhea)    POSTOPERATIVE DIAGNOSIS:    Same   Normal colonoscopy   Diverticulosis was not identified.   Hemorrhoids  were  identified.    SURGEON: Jonathan Pillai MD    ASSISTANTS: None    ANESTHESIA: Monitored Anesthesia Care    COMPLICATIONS: None apparent    TRANSFUSIONS: None    TISSUE TO PATHOLOGY: None    FINDINGS: Normal colonoscopy.  Diverticulosis was not identified.  Hemorrhoids  were  identified.    INDICATIONS: This is a 63 year old female in need of a colonoscopy for Change in Bowel Habits (diarrhea).  The patient will be taken to the endoscopy suite for that procedure.    DESCRIPTIONS OF PROCEDURE IN DETAIL: After consent was obtained the patient was taken to the endoscopy suite and placed in the left lateral decubitus position.  The patient was identified and the correct patient was confirmed.  Monitored Anesthesia Care was given.  A time out was performed verifying the correct patient and the correct procedure.  The entire operative team was in agreement.  All necessary equipment and supplies were in the room.    Rectal exam was performed and no lesions of the anal canal were noted.  The colonoscope was inserted into the anus and passed without difficulty to the cecum.  The cecum was identified by the ileocecal valve, the coalescence of the tinea and the appendiceal orifice.  Upon withdrawal all walls of the colon were visualized.  There were no polyps, masses or evidence of colitis seen.  Diverticulosis was not seen.  Upon reaching the rectum the scope was retroflexed and internal hemorrhoids  were  seen.  The scope was straightened back out and removed from the patient.  The patient was then taken to the recovery room in stable condition tolerating the procedure well.      Prep: fair    Withdrawal time was 5 minutes.    It is  recommended that the patient have another colonoscopy in 10 years.

## 2023-10-05 PROBLEM — R19.7 DIARRHEA IN ADULT PATIENT: Status: ACTIVE | Noted: 2023-09-23

## 2023-10-06 LAB
PATH REPORT.COMMENTS IMP SPEC: NORMAL
PATH REPORT.FINAL DX SPEC: NORMAL
PATH REPORT.GROSS SPEC: NORMAL
PATH REPORT.MICROSCOPIC SPEC OTHER STN: NORMAL
PATH REPORT.RELEVANT HX SPEC: NORMAL
PHOTO IMAGE: NORMAL

## 2023-10-10 ENCOUNTER — OFFICE VISIT (OUTPATIENT)
Dept: SURGERY | Facility: OTHER | Age: 63
End: 2023-10-10
Attending: SURGERY
Payer: COMMERCIAL

## 2023-10-10 VITALS
DIASTOLIC BLOOD PRESSURE: 78 MMHG | SYSTOLIC BLOOD PRESSURE: 136 MMHG | BODY MASS INDEX: 26.16 KG/M2 | RESPIRATION RATE: 14 BRPM | TEMPERATURE: 97.6 F | HEIGHT: 65 IN | OXYGEN SATURATION: 98 % | HEART RATE: 71 BPM | WEIGHT: 157 LBS

## 2023-10-10 DIAGNOSIS — K52.832 LYMPHOCYTIC COLITIS: Primary | ICD-10-CM

## 2023-10-10 PROCEDURE — 99213 OFFICE O/P EST LOW 20 MIN: CPT | Performed by: SURGERY

## 2023-10-10 ASSESSMENT — PAIN SCALES - GENERAL: PAINLEVEL: NO PAIN (0)

## 2023-10-10 NOTE — PROGRESS NOTES
CLINIC NOTE - POST-OP ENDOSCOPY  10/10/2023    Patient:Jeannie Phelps    Procedure: Colonoscopy with biopsies    This is a 63 year old female who is 1 weeks s/p Colonoscopy with biopsies.  At the time of endoscopy no abnormalities were noted.  Biopsies returned as lymphocytic colitis.  Patient does continue to have diarrhea.     Current Medications:  Current Outpatient Medications   Medication Sig Dispense Refill    ASPIRIN LOW DOSE 81 MG chewable tablet       atorvastatin (LIPITOR) 80 MG tablet Take 1 tablet by mouth At Bedtime      EPINEPHrine (ANY BX GENERIC EQUIV) 0.3 MG/0.3ML injection 2-pack INJECT CONTENTS OF 1 PEN AS NEEDED FOR ALLERGIC REACTION 2 each 1    fexofenadine (ALLEGRA) 60 MG tablet Take 60 mg by mouth      fluocinolone (SYNALAR) 0.01 % solution APPLY TO THE SCALP ONCE - TWICE DAILY FOR UP TO 4 WEEKS. TAKE A ONE WEEK BREAK, AND REPEAT AS NEEDED      fluocinonide (LIDEX) 0.05 % external gel Apply 1 drop topically as needed      hydrochlorothiazide (HYDRODIURIL) 25 MG tablet Take 1 tablet by mouth daily at 2 pm      lisinopril (ZESTRIL) 40 MG tablet Take 1 tablet by mouth daily at 2 pm      Multiple Vitamins-Minerals (MULTIVITAMIN ADULT PO) Take 1 tablet by mouth daily      NIFEdipine ER OSMOTIC (PROCARDIA XL) 30 MG 24 hr tablet Take 1 tablet by mouth once daily 30 tablet 10    tazarotene (TAZORAC) 0.05 % external gel Apply 1 drop topically as needed      ivermectin (SOOLANTRA) 1 % cream APPLY CREAM TOPICALLY TO YOUR ENTIRE FACE AT BEDTIME AFTER WASHING (Patient not taking: Reported on 10/10/2023)         Allergies:  Allergies   Allergen Reactions    Rofecoxib Other (See Comments)     Pt had problems with bleeding with urination, Verified in Meditech: Y, Severity in Meditech: S, Vioxx TABS    Hydrocodone Itching     Nerve sensation     Influenza Vaccines      pt's heart races., Verified in Meditech: Y, Severity in Meditech: S    Metoprolol Other (See Comments)     Hot flashes    Amlodipine  "Other (See Comments) and Palpitations     Hot flashes    Cefuroxime Rash     Cefuroxime Axetil TABS - Rash    Lisinopril Other (See Comments) and Palpitations       PHYSICAL EXAM:   Vital signs: /78 (BP Location: Right arm, Cuff Size: Adult Regular)   Pulse 71   Temp 97.6  F (36.4  C) (Tympanic)   Resp 14   Ht 1.651 m (5' 5\")   Wt 71.2 kg (157 lb)   SpO2 98%   BMI 26.13 kg/m     Weight: [unfilled]   BMI: Body mass index is 26.13 kg/m .   General: Normal, healthy, cooperative, in no acute distress, alert   Lungs: clear to auscultation   CV: Regular rate and rhythm without murmer   Abdominal: abdomen is soft without significant tenderness, masses, organomegaly or guarding       PATHOLOGY:  Case Report   Date Value Ref Range Status   10/02/2023   Final    Surgical Pathology Report                         Case: [id number]                                  Authorizing Provider:  Jonathan Pillai MD  Collected:           10/02/2023 03:44 PM          Ordering Location:     [facility]         Received:            10/03/2023 01:20 PM          Pathologist:           Jose Elise DO                                                         Specimens:   A) - Large Intestine, Colon, Ascending                                                              B) - Other, Colon biopsy at 120 cm                                                                  C) - Other, colon biopsy at 90 cm                                                                   D) - Other, colon biopsy at 60 cm                                                                   E) - Other, colon biopsy at 30 cm                                                                   F) - Rectum                                                                                 Final Diagnosis   Date Value Ref Range Status   10/02/2023   Final    A.  Ascending colon, biopsy:  -Colonic mucosa with features of lymphocytic colitis.    B.  Colon at " 120 cm, biopsy:  -Colonic mucosa with features of lymphocytic colitis.    C.  Colon at 90 cm, biopsy:  -Colonic mucosa with features of lymphocytic colitis.    D.  Colon at 60 cm, biopsy:  -Colonic mucosa with features of lymphocytic colitis.    E.  Colon at 30 cm, biopsy:  -Colonic mucosa with features of lymphocytic colitis.    F.  Rectum, biopsy:  -Rectal mucosa with features of lymphocytic colitis.         ASSESSMENT:    63 year old female who is 1 weeks s/p Colonoscopy with biopsies.  Biopsies show lymphocytic colitis    PLAN:   Discussed options with her.  We will go ahead and treat her with Pepto-Bismol.  We will see how she does over the next 2 weeks.  If she does not improve we will start her on steroids.  She will follow-up in 2 weeks.

## 2023-10-24 ENCOUNTER — OFFICE VISIT (OUTPATIENT)
Dept: SURGERY | Facility: OTHER | Age: 63
End: 2023-10-24
Attending: SURGERY
Payer: COMMERCIAL

## 2023-10-24 VITALS
WEIGHT: 157 LBS | TEMPERATURE: 97.9 F | SYSTOLIC BLOOD PRESSURE: 134 MMHG | DIASTOLIC BLOOD PRESSURE: 76 MMHG | HEART RATE: 92 BPM | OXYGEN SATURATION: 98 % | HEIGHT: 65 IN | BODY MASS INDEX: 26.16 KG/M2

## 2023-10-24 DIAGNOSIS — K52.832 LYMPHOCYTIC COLITIS: Primary | ICD-10-CM

## 2023-10-24 PROCEDURE — 99213 OFFICE O/P EST LOW 20 MIN: CPT | Performed by: SURGERY

## 2023-10-24 ASSESSMENT — PAIN SCALES - GENERAL: PAINLEVEL: NO PAIN (0)

## 2023-12-11 ENCOUNTER — OFFICE VISIT (OUTPATIENT)
Dept: FAMILY MEDICINE | Facility: OTHER | Age: 63
End: 2023-12-11
Attending: FAMILY MEDICINE
Payer: COMMERCIAL

## 2023-12-11 VITALS
SYSTOLIC BLOOD PRESSURE: 130 MMHG | OXYGEN SATURATION: 96 % | TEMPERATURE: 98.4 F | WEIGHT: 163.3 LBS | BODY MASS INDEX: 27.21 KG/M2 | DIASTOLIC BLOOD PRESSURE: 64 MMHG | HEIGHT: 65 IN | HEART RATE: 85 BPM

## 2023-12-11 DIAGNOSIS — M79.10 MYALGIA: ICD-10-CM

## 2023-12-11 DIAGNOSIS — K52.832 LYMPHOCYTIC COLITIS: Primary | ICD-10-CM

## 2023-12-11 DIAGNOSIS — Z86.73 HISTORY OF CVA (CEREBROVASCULAR ACCIDENT): ICD-10-CM

## 2023-12-11 DIAGNOSIS — R71.8 ELEVATED MCV: ICD-10-CM

## 2023-12-11 LAB
ALBUMIN SERPL BCG-MCNC: 4.6 G/DL (ref 3.5–5.2)
ALP SERPL-CCNC: 80 U/L (ref 40–150)
ALT SERPL W P-5'-P-CCNC: 27 U/L (ref 0–50)
ANION GAP SERPL CALCULATED.3IONS-SCNC: 12 MMOL/L (ref 7–15)
AST SERPL W P-5'-P-CCNC: 32 U/L (ref 0–45)
BASOPHILS # BLD AUTO: 0.1 10E3/UL (ref 0–0.2)
BASOPHILS NFR BLD AUTO: 1 %
BILIRUB SERPL-MCNC: 0.4 MG/DL
BUN SERPL-MCNC: 17.5 MG/DL (ref 8–23)
CALCIUM SERPL-MCNC: 10.1 MG/DL (ref 8.8–10.2)
CHLORIDE SERPL-SCNC: 98 MMOL/L (ref 98–107)
CK SERPL-CCNC: 176 U/L (ref 26–192)
CREAT SERPL-MCNC: 0.61 MG/DL (ref 0.51–0.95)
CRP SERPL-MCNC: <3 MG/L
DEPRECATED HCO3 PLAS-SCNC: 26 MMOL/L (ref 22–29)
EGFRCR SERPLBLD CKD-EPI 2021: >90 ML/MIN/1.73M2
EOSINOPHIL # BLD AUTO: 0.1 10E3/UL (ref 0–0.7)
EOSINOPHIL NFR BLD AUTO: 1 %
ERYTHROCYTE [DISTWIDTH] IN BLOOD BY AUTOMATED COUNT: 12.2 % (ref 10–15)
GLUCOSE SERPL-MCNC: 98 MG/DL (ref 70–99)
HCT VFR BLD AUTO: 39.3 % (ref 35–47)
HGB BLD-MCNC: 13.4 G/DL (ref 11.7–15.7)
IMM GRANULOCYTES # BLD: 0 10E3/UL
IMM GRANULOCYTES NFR BLD: 0 %
LYMPHOCYTES # BLD AUTO: 1.8 10E3/UL (ref 0.8–5.3)
LYMPHOCYTES NFR BLD AUTO: 29 %
MCH RBC QN AUTO: 34.9 PG (ref 26.5–33)
MCHC RBC AUTO-ENTMCNC: 34.1 G/DL (ref 31.5–36.5)
MCV RBC AUTO: 102 FL (ref 78–100)
MONOCYTES # BLD AUTO: 0.6 10E3/UL (ref 0–1.3)
MONOCYTES NFR BLD AUTO: 9 %
NEUTROPHILS # BLD AUTO: 3.7 10E3/UL (ref 1.6–8.3)
NEUTROPHILS NFR BLD AUTO: 60 %
NRBC # BLD AUTO: 0 10E3/UL
NRBC BLD AUTO-RTO: 0 /100
PLATELET # BLD AUTO: 185 10E3/UL (ref 150–450)
POTASSIUM SERPL-SCNC: 3.9 MMOL/L (ref 3.4–5.3)
PROT SERPL-MCNC: 7.4 G/DL (ref 6.4–8.3)
RBC # BLD AUTO: 3.84 10E6/UL (ref 3.8–5.2)
SODIUM SERPL-SCNC: 136 MMOL/L (ref 135–145)
WBC # BLD AUTO: 6.3 10E3/UL (ref 4–11)

## 2023-12-11 PROCEDURE — 99214 OFFICE O/P EST MOD 30 MIN: CPT | Performed by: FAMILY MEDICINE

## 2023-12-11 PROCEDURE — 85025 COMPLETE CBC W/AUTO DIFF WBC: CPT | Performed by: FAMILY MEDICINE

## 2023-12-11 PROCEDURE — 82550 ASSAY OF CK (CPK): CPT | Performed by: FAMILY MEDICINE

## 2023-12-11 PROCEDURE — 80053 COMPREHEN METABOLIC PANEL: CPT | Performed by: FAMILY MEDICINE

## 2023-12-11 PROCEDURE — 86140 C-REACTIVE PROTEIN: CPT | Performed by: FAMILY MEDICINE

## 2023-12-11 PROCEDURE — 82607 VITAMIN B-12: CPT | Performed by: FAMILY MEDICINE

## 2023-12-11 PROCEDURE — 36415 COLL VENOUS BLD VENIPUNCTURE: CPT | Performed by: FAMILY MEDICINE

## 2023-12-11 RX ORDER — BUDESONIDE 3 MG/1
9 CAPSULE, COATED PELLETS ORAL EVERY MORNING
Qty: 90 CAPSULE | Refills: 1 | Status: SHIPPED | OUTPATIENT
Start: 2023-12-11

## 2023-12-11 ASSESSMENT — PAIN SCALES - GENERAL: PAINLEVEL: NO PAIN (0)

## 2023-12-11 NOTE — Clinical Note
See note - lymphocytic colitis, 10-12 stools per day; I reviewed your note; I started Budesonide.  Let me know if you'd prefer other dose/taper, etc.  Thanks.

## 2023-12-11 NOTE — PATIENT INSTRUCTIONS
Start Budesonide 9 mg daily.    Typical course -   9 mg daily for 6-8 weeks, then 6 mg daily for 2 weeks, then 3 mg daily for 2 weeks, then stop.  Taper only initiated if under control - no watery stools and less than 3 stools per day.    Will update Dr Pillai as well - in case he has other specific instructions.    Labs today - will notify of results.    Consider change of statin - Lipitor 80 mg to Crestor 40 mg - both high intensity statin.  If unable to tolerate either - trial of lower dose?

## 2023-12-11 NOTE — LETTER
December 14, 2023      Jeannie Phelps  [address] [place] [address]        Dear ,    We are writing to inform you of your test results.    Notify of labs -normal overall.     Resulted Orders   CRP, inflammation   Result Value Ref Range    CRP Inflammation <3.00 <5.00 mg/L   Comprehensive metabolic panel (BMP + Alb, Alk Phos, ALT, AST, Total. Bili, TP)   Result Value Ref Range    Sodium 136 135 - 145 mmol/L      Comment:      Reference intervals for this test were updated on 09/26/2023 to more accurately reflect our healthy population. There may be differences in the flagging of prior results with similar values performed with this method. Interpretation of those prior results can be made in the context of the updated reference intervals.     Potassium 3.9 3.4 - 5.3 mmol/L    Carbon Dioxide (CO2) 26 22 - 29 mmol/L    Anion Gap 12 7 - 15 mmol/L    Urea Nitrogen 17.5 8.0 - 23.0 mg/dL    Creatinine 0.61 0.51 - 0.95 mg/dL    GFR Estimate >90 >60 mL/min/1.73m2    Calcium 10.1 8.8 - 10.2 mg/dL    Chloride 98 98 - 107 mmol/L    Glucose 98 70 - 99 mg/dL    Alkaline Phosphatase 80 40 - 150 U/L      Comment:      Reference intervals for this test were updated on 11/14/2023 to more accurately reflect our healthy population. There may be differences in the flagging of prior results with similar values performed with this method. Interpretation of those prior results can be made in the context of the updated reference intervals.    AST 32 0 - 45 U/L      Comment:      Reference intervals for this test were updated on 6/12/2023 to more accurately reflect our healthy population. There may be differences in the flagging of prior results with similar values performed with this method. Interpretation of those prior results can be made in the context of the updated reference intervals.    ALT 27 0 - 50 U/L      Comment:      Reference intervals for this test were updated on 6/12/2023 to more accurately reflect  our healthy population. There may be differences in the flagging of prior results with similar values performed with this method. Interpretation of those prior results can be made in the context of the updated reference intervals.      Protein Total 7.4 6.4 - 8.3 g/dL    Albumin 4.6 3.5 - 5.2 g/dL    Bilirubin Total 0.4 <=1.2 mg/dL   CK total   Result Value Ref Range     26 - 192 U/L   CBC with platelets and differential   Result Value Ref Range    WBC Count 6.3 4.0 - 11.0 10e3/uL    RBC Count 3.84 3.80 - 5.20 10e6/uL    Hemoglobin 13.4 11.7 - 15.7 g/dL    Hematocrit 39.3 35.0 - 47.0 %     (H) 78 - 100 fL    MCH 34.9 (H) 26.5 - 33.0 pg    MCHC 34.1 31.5 - 36.5 g/dL    RDW 12.2 10.0 - 15.0 %    Platelet Count 185 150 - 450 10e3/uL    % Neutrophils 60 %    % Lymphocytes 29 %    % Monocytes 9 %    % Eosinophils 1 %    % Basophils 1 %    % Immature Granulocytes 0 %    NRBCs per 100 WBC 0 <1 /100    Absolute Neutrophils 3.7 1.6 - 8.3 10e3/uL    Absolute Lymphocytes 1.8 0.8 - 5.3 10e3/uL    Absolute Monocytes 0.6 0.0 - 1.3 10e3/uL    Absolute Eosinophils 0.1 0.0 - 0.7 10e3/uL    Absolute Basophils 0.1 0.0 - 0.2 10e3/uL    Absolute Immature Granulocytes 0.0 <=0.4 10e3/uL    Absolute NRBCs 0.0 10e3/uL       If you have any questions or concerns, please call the clinic at the number listed above.       Sincerely,      More Martinez MD

## 2023-12-11 NOTE — PROGRESS NOTES
Assessment & Plan     Lymphocytic colitis  Pepto bismol and imodium not providing adequate relief.  Dr Pillai's note reviewed.  Did advised steroids if not responding.  Will start Budesonide 9 mg for 6-8 weeks, then 6 mg x 2 weeks, then 3 mg x 2 weeks, unless otherwise directed by Dr Pillai.  If symptoms not controlled, will not taper.    If not responding, will reassess.  Discussed risks/benefits of steroids.  Labs today.  MTM consult.  Lipitor does list diarrhea.  Crestor lists constipation.  Consider change?  1 variable at a time.  Not aware specifically of it causing lymphocytic colitis.  - CBC with platelets and differential; Future  - CRP, inflammation; Future  - Comprehensive metabolic panel (BMP + Alb, Alk Phos, ALT, AST, Total. Bili, TP); Future  - budesonide (ENTOCORT EC) 3 MG EC capsule; Take 3 capsules (9 mg) by mouth every morning  - Med Therapy Management Referral  - CBC with platelets and differential  - CRP, inflammation  - Comprehensive metabolic panel (BMP + Alb, Alk Phos, ALT, AST, Total. Bili, TP)    History of CVA (cerebrovascular accident)  As above.  CVA x 2.   Secondary prevention with high intensity statin advised.  Consider change Lipitor 80 mg to Crestor 40 mg.  If still not tolerated, lower dose/intensity.  Add CK.  - Med Therapy Management Referral    Myalgia  As above. Check Ck level with statin.  - CK total; Future  - CK total      32 minutes spent by me on the date of the encounter doing chart review, history and exam, documentation and further activities per the note       See Patient Instructions    No follow-ups on file.    More Burris MD  [facility] - ARASELI Dennis is a 63 year old, presenting for the following health issues:  Diarrhea      HPI     Diarrhea  Onset/Duration: started May/June, has completed a colonscopy and diagnosed with lymphocytic colitis   Description:       Consistency of stool: watery and food seems not to digest and  "depends on what she eats       Blood in stool: No       Number of loose stools past 24 hours: 10-12  Progression of Symptoms: same and constant  Accompanying signs and symptoms:       Fever: No       Nausea/Vomiting: No       Abdominal pain:gets the cramps before she goes        Weight loss: a few pounds        Episodes of constipation: No  History   Ill contacts: No  Recent use of antibiotics: No  Recent travels: No  Recent medication-new or changes(Rx or OTC): is thinking its the Lipitor that is causing  the diarrhea. Had 2 strokes in May and this was when she started the medication and when the problems started  Precipitating or alleviating factors: None  Therapies tried and outcome: 3 pepto bismol 3x daily, Imodium in between     Dr Pillai visit 10/24/23.  Pepto helps.  Pepto TID, Imodium in between, luis mathew.      Sometimes wakes patient up in the middle of the night.    Sometimes all day.  Days vary - some days 10-12; good day 5 times.    \"Its controlling my life.\"  We've given up 3 vacations.    Lipitor due to strokes.  80 mg.  Having flushes, rosacea.  Aches, \" I just hurt\".   Neurology follow up now as needed.  Felt to be related to DRU (CPAP compliant) and HTN.   Wearing loop recorder - so far, no atrial fibrillation.        Review of Systems   Constitutional, HEENT, cardiovascular, pulmonary, gi and gu systems are negative, except as otherwise noted.      Objective    /64 (BP Location: Right arm, Patient Position: Sitting, Cuff Size: Adult Regular)   Pulse 85   Temp 98.4  F (36.9  C) (Tympanic)   Ht 1.651 m (5' 5\")   Wt 74.1 kg (163 lb 4.8 oz)   SpO2 96%   BMI 27.17 kg/m    Body mass index is 27.17 kg/m .  Physical Exam   GENERAL: healthy, alert and no distress  NECK: no adenopathy, no asymmetry, masses, or scars and thyroid normal to palpation  RESP: lungs clear to auscultation - no rales, rhonchi or wheezes  CV: regular rate and rhythm, normal S1 S2, no S3 or S4, no murmur, click or " rub, no peripheral edema and peripheral pulses strong  ABDOMEN: soft, nontender, no hepatosplenomegaly, no masses and bowel sounds normal  MS: no gross musculoskeletal defects noted, no edema  PSYCH: mentation appears normal, affect normal/bright    Labs pending - cbc, cmp, crp, ck.

## 2023-12-12 LAB — VIT B12 SERPL-MCNC: 566 PG/ML (ref 232–1245)

## 2023-12-15 ENCOUNTER — VIRTUAL VISIT (OUTPATIENT)
Dept: PHARMACY | Facility: PHYSICIAN GROUP | Age: 63
End: 2023-12-15
Payer: COMMERCIAL

## 2023-12-15 DIAGNOSIS — Z79.82 ASPIRIN LONG-TERM USE: ICD-10-CM

## 2023-12-15 DIAGNOSIS — I10 ESSENTIAL HYPERTENSION: Primary | Chronic | ICD-10-CM

## 2023-12-15 DIAGNOSIS — Z78.9 TAKES DIETARY SUPPLEMENTS: ICD-10-CM

## 2023-12-15 DIAGNOSIS — R21 RASH: ICD-10-CM

## 2023-12-15 DIAGNOSIS — E78.2 MIXED HYPERLIPIDEMIA: ICD-10-CM

## 2023-12-15 DIAGNOSIS — Z88.9 H/O ALLERGY: ICD-10-CM

## 2023-12-15 DIAGNOSIS — K52.832 LYMPHOCYTIC COLITIS: ICD-10-CM

## 2023-12-15 PROCEDURE — 99605 MTMS BY PHARM NP 15 MIN: CPT

## 2023-12-15 RX ORDER — CETIRIZINE HYDROCHLORIDE 10 MG/1
10 TABLET ORAL DAILY PRN
COMMUNITY

## 2023-12-15 RX ORDER — IVERMECTIN 10 MG/G
CREAM TOPICAL DAILY
COMMUNITY

## 2023-12-15 NOTE — PROGRESS NOTES
"Medication Therapy Management (MTM) Encounter    ASSESSMENT:                            Medication Adherence/Access:   No issues identified    Hypertension:   Stable. Patient is meeting blood pressure goal of < 130/80mmHg.    Hyperlipidemia/Cardiac Prevention:   See section on \"Lymphocytic Colitis\" below.     Lymphocytic Colitis:   The main medication that could be contributing to the patient's diarrhea is atorvastatin (7% to 14% incidence). An alternative option could be rosuvastatin 40 mg daily (rosuvastatin does not have an incidence of diarrhea, has a listed incidence of constipation - 3 to 5%).     Allergies:  Stable.    Skin Rash:  Stable.    Supplements:  Stable.    PLAN:                            The following recommendations are being made directly to Dr. More Martinez MD:  The main medication that could be contributing to the patient's diarrhea is atorvastatin (7% to 14% incidence). An alternative option could be rosuvastatin 40 mg daily (rosuvastatin does not have an incidence of diarrhea, has a listed incidence of constipation - 3 to 5%).     Follow-up: 1 month, sooner if needed.     SUBJECTIVE/OBJECTIVE:                          Jeannie Phelps is a 63 year old female called for an initial visit. She was referred to me from Dr. More Martinez MD.      Reason for visit: Initial visit - lymphocytic colitis.    Allergies/ADRs: Reviewed in chart  Past Medical History: Reviewed in chart  Tobacco: She reports that she has never smoked. She has never used smokeless tobacco.  Alcohol: 4-6 beverages / week  Caffeine: two cups of coffee during the day.     Medication Adherence/Access: no issues reported  Patient takes medications directly from bottles.  Patient takes medications 3 time(s) per day.   Per patient, misses medication 0 times per week.   Medication barriers: none.   The patient fills medications at [place]: NO, fills medications at [facility] in [place], [place].    Hypertension   Lisinopril " 40 mg daily, hydrochlorothiazide 25 mg daily, nifedipine ER 30 mg daily  Patient reports no current medication side effects  Patient self monitors blood pressure.  Home BP monitoring ranges 120-130s/70s mmHg .       BP Readings from Last 3 Encounters:   12/11/23 130/64   10/24/23 134/76   10/10/23 136/78     Pulse Readings from Last 3 Encounters:   12/11/23 85   10/24/23 92   10/10/23 71     Hyperlipidemia   Hyperlipidemia/Cardiac Prevention  Atorvastatin 80 mg daily, aspirin 81 mg daily  Patient reports the following side effects: diarrhea, rosacea, restlessness at night, body aches.  The ASCVD Risk score (Arthur DK, et al., 2019) failed to calculate for the following reasons:    The patient has a prior MI or stroke diagnosis     Recent Labs   Lab Test 07/20/23  0858   CHOL 156   HDL 69   LDL 72   TRIG 75     Lymphocytic Colitis:   Budesonide 9 mg every morning. Patient reports this diarrhea has been happening since the end of May. Patient reports that she switched from rosuvastatin to atorvastatin when she had her stroke in March and thinks this could be contributing. Patient reports irritability and fatigue while taking budesonide, but the medication is helpful for the diarrhea.      Allergies:  Cetirizine 10 mg daily as needed (takes in the spring, summer, and fall), epinephrine autoinjector 0.3 mg as needed for anaphylaxis. Patient reports no issues currently.     Skin Rash:  Fluocinolone 0.01% solution topically to scalp as needed, fluocinonide 0.05% gel topically as needed, tazarotene 0.05% gel topically as needed, ivermectin 1% cream topically daily. Patient reports no issues at this time.     Supplements:  Multivitamin daily, turmeric 1500 mg daily. Patient reports no issues at this time.     Today's Vitals: There were no vitals taken for this visit.  ----------------  I spent 28 minutes with this patient today. I offer these suggestions for consideration by Dr. More Martinez MD. A copy of the visit note  was provided to the patient's provider(s).    A summary of these recommendations was declined by the patient.    Kian Silver PharmD  Medication Therapy Management Pharmacist  [facility]  Office phone: [phone]    Telemedicine Visit Details  Type of service:  Telephone visit  Start Time:  9:01 AM  End Time: 9:29 AM     Medication Therapy Recommendations  Mixed hyperlipidemia    Current Medication: atorvastatin (LIPITOR) 80 MG tablet   Rationale: Undesirable effect - Adverse medication event - Safety   Recommendation: Change Medication - rosuvastatin 40 MG tablet   Status: Contact Provider - Awaiting Response

## 2023-12-19 NOTE — PATIENT INSTRUCTIONS
"Recommendations from today's MTM visit:                                                    MTM (medication therapy management) is a service provided by a clinical pharmacist designed to help you get the most of out of your medicines.   Today we reviewed what your medicines are for, how to know if they are working, that your medicines are safe and how to make your medicine regimen as easy as possible.      The following recommendations are being made directly to Dr. More Martinez MD:  The main medication that could be contributing to the patient's diarrhea is atorvastatin (7% to 14% incidence). An alternative option could be rosuvastatin 40 mg daily (rosuvastatin does not have an incidence of diarrhea, has a listed incidence of constipation - 3 to 5%).     Follow-up: 1 month, sooner if needed.     It was great speaking with you today.  I value your experience and would be very thankful for your time in providing feedback in our clinic survey. In the next few days, you may receive an email or text message from [employer] with a link to a survey related to your  clinical pharmacist.\"     To schedule another MTM appointment, please call the clinic directly or you may call the MTM scheduling line at [phone] or toll-free at [phone].     My Clinical Pharmacist's contact information:                                                      Please feel free to contact me with any questions or concerns you have.      Kian Silver, PharmD  Medication Therapy Management Pharmacist  [facility]  Office phone: [phone]     "

## 2023-12-20 ENCOUNTER — DOCUMENTATION ONLY (OUTPATIENT)
Dept: HOME HEALTH SERVICES | Facility: CLINIC | Age: 63
End: 2023-12-20
Payer: COMMERCIAL

## 2023-12-20 DIAGNOSIS — G47.33 OSA (OBSTRUCTIVE SLEEP APNEA): Primary | ICD-10-CM

## 2023-12-20 NOTE — PROGRESS NOTES
Patient came to [facility] for mask fitting appointment on December 20, 2023. Patient requested to switch masks because ResMed magnet recall. Discussed the following masks: P10, P30I, Mirage Fx, and Wisp  Patient selected a Resmed Mask name: P10 for Her Pillow mask size Medium

## 2024-03-13 ENCOUNTER — OFFICE VISIT (OUTPATIENT)
Dept: FAMILY MEDICINE | Facility: OTHER | Age: 64
End: 2024-03-13
Attending: STUDENT IN AN ORGANIZED HEALTH CARE EDUCATION/TRAINING PROGRAM
Payer: COMMERCIAL

## 2024-03-13 VITALS
HEART RATE: 89 BPM | BODY MASS INDEX: 27.02 KG/M2 | SYSTOLIC BLOOD PRESSURE: 137 MMHG | WEIGHT: 162.38 LBS | TEMPERATURE: 98.8 F | OXYGEN SATURATION: 97 % | DIASTOLIC BLOOD PRESSURE: 85 MMHG

## 2024-03-13 DIAGNOSIS — H57.9 EYE PRESSURE: ICD-10-CM

## 2024-03-13 DIAGNOSIS — Z23 ENCOUNTER FOR IMMUNIZATION: ICD-10-CM

## 2024-03-13 DIAGNOSIS — Z86.73 HISTORY OF CVA (CEREBROVASCULAR ACCIDENT): Chronic | ICD-10-CM

## 2024-03-13 DIAGNOSIS — H53.9 DIFFICULTY READING DUE TO VISUAL PROBLEM: ICD-10-CM

## 2024-03-13 DIAGNOSIS — K52.832 LYMPHOCYTIC COLITIS: ICD-10-CM

## 2024-03-13 DIAGNOSIS — E78.2 MIXED HYPERLIPIDEMIA: ICD-10-CM

## 2024-03-13 DIAGNOSIS — I10 ESSENTIAL HYPERTENSION: Primary | Chronic | ICD-10-CM

## 2024-03-13 PROBLEM — I77.3 FIBROMUSCULAR DYSPLASIA (H): Chronic | Status: ACTIVE | Noted: 2023-09-12

## 2024-03-13 PROBLEM — R76.0 ANTIPHOSPHOLIPID ANTIBODY POSITIVE: Chronic | Status: ACTIVE | Noted: 2023-08-02

## 2024-03-13 PROBLEM — I70.1 RENAL ARTERY STENOSIS (H): Chronic | Status: ACTIVE | Noted: 2023-09-12

## 2024-03-13 PROBLEM — R19.7 DIARRHEA IN ADULT PATIENT: Status: RESOLVED | Noted: 2023-09-23 | Resolved: 2024-03-13

## 2024-03-13 LAB
ANION GAP SERPL CALCULATED.3IONS-SCNC: 10 MMOL/L (ref 7–15)
BUN SERPL-MCNC: 14.3 MG/DL (ref 8–23)
CALCIUM SERPL-MCNC: 9.6 MG/DL (ref 8.8–10.2)
CHLORIDE SERPL-SCNC: 96 MMOL/L (ref 98–107)
CREAT SERPL-MCNC: 0.61 MG/DL (ref 0.51–0.95)
DEPRECATED HCO3 PLAS-SCNC: 27 MMOL/L (ref 22–29)
EGFRCR SERPLBLD CKD-EPI 2021: >90 ML/MIN/1.73M2
GLUCOSE SERPL-MCNC: 86 MG/DL (ref 70–99)
POTASSIUM SERPL-SCNC: 3.9 MMOL/L (ref 3.4–5.3)
SODIUM SERPL-SCNC: 133 MMOL/L (ref 135–145)

## 2024-03-13 PROCEDURE — 80048 BASIC METABOLIC PNL TOTAL CA: CPT | Performed by: STUDENT IN AN ORGANIZED HEALTH CARE EDUCATION/TRAINING PROGRAM

## 2024-03-13 PROCEDURE — 90750 HZV VACC RECOMBINANT IM: CPT | Performed by: STUDENT IN AN ORGANIZED HEALTH CARE EDUCATION/TRAINING PROGRAM

## 2024-03-13 PROCEDURE — 99214 OFFICE O/P EST MOD 30 MIN: CPT | Mod: 25 | Performed by: STUDENT IN AN ORGANIZED HEALTH CARE EDUCATION/TRAINING PROGRAM

## 2024-03-13 PROCEDURE — 90471 IMMUNIZATION ADMIN: CPT | Performed by: STUDENT IN AN ORGANIZED HEALTH CARE EDUCATION/TRAINING PROGRAM

## 2024-03-13 PROCEDURE — 36415 COLL VENOUS BLD VENIPUNCTURE: CPT | Performed by: STUDENT IN AN ORGANIZED HEALTH CARE EDUCATION/TRAINING PROGRAM

## 2024-03-13 RX ORDER — LISINOPRIL 40 MG/1
40 TABLET ORAL DAILY
Qty: 90 TABLET | Refills: 3 | Status: SHIPPED | OUTPATIENT
Start: 2024-03-13

## 2024-03-13 RX ORDER — NIFEDIPINE 30 MG/1
30 TABLET, EXTENDED RELEASE ORAL DAILY
Qty: 90 TABLET | Refills: 3 | Status: SHIPPED | OUTPATIENT
Start: 2024-03-13

## 2024-03-13 RX ORDER — ATORVASTATIN CALCIUM 80 MG/1
80 TABLET, FILM COATED ORAL AT BEDTIME
Qty: 90 TABLET | Refills: 3 | Status: SHIPPED | OUTPATIENT
Start: 2024-03-13

## 2024-03-13 RX ORDER — HYDROCHLOROTHIAZIDE 25 MG/1
25 TABLET ORAL DAILY
Qty: 90 TABLET | Refills: 3 | Status: SHIPPED | OUTPATIENT
Start: 2024-03-13

## 2024-03-13 ASSESSMENT — PAIN SCALES - GENERAL: PAINLEVEL: NO PAIN (0)

## 2024-03-13 NOTE — PROGRESS NOTES
"  Assessment & Plan     Essential hypertension  Controlled. Continues on hydrochlorothiazide, procardia xl, lisinopril   Update bmp today.   No changes to medication today.   - hydrochlorothiazide (HYDRODIURIL) 25 MG tablet; Take 1 tablet (25 mg) by mouth daily  - NIFEdipine ER OSMOTIC (PROCARDIA XL) 30 MG 24 hr tablet; Take 1 tablet (30 mg) by mouth daily  - lisinopril (ZESTRIL) 40 MG tablet; Take 1 tablet (40 mg) by mouth daily  - Basic metabolic panel; Future  - Basic metabolic panel    Lymphocytic colitis  Resolved/improved. Treating with supplements, see below. Did not tolerate budesonide.     Mixed hyperlipidemia  History of CVA (cerebrovascular accident)  Discussed ideal cholesterol and LDL goals today. Reviewed I would not be worried about dementia with cholesterol less than 150. Ideally will keep it from being too high to minimize dementia risk.  LDL at goal last summer, will update again with physical later this year.   Continue high dose statin.   - atorvastatin (LIPITOR) 80 MG tablet; Take 1 tablet (80 mg) by mouth at bedtime    Difficulty reading due to visual problem  Eye pressure  No red flags on history or exam today but certainly should have eye exam asap. Suspect may have cataracts, lower suspicion for glaucoma but does describe sensation of pressure. No acute findings today.   - Adult Eye  Referral; Future    Encounter for immunization  - ZOSTER RECOMBINANT ADJUVANTED (SHINGRIX)        BMI  Estimated body mass index is 27.02 kg/m  as calculated from the following:    Height as of 12/11/23: 1.651 m (5' 5\").    Weight as of this encounter: 73.7 kg (162 lb 6 oz).   Weight management plan: Discussed healthy diet and exercise guidelines      Shaun Dennis is a 64 year old, presenting for the following health issues:  Hypertension        3/13/2024    11:08 AM   Additional Questions   Roomed by Shari Summers   Accompanied by None         3/13/2024    11:08 AM   Patient Reported " Additional Medications   Patient reports taking the following new medications Optimal fat, sugar, trim formula; optimal one digest-a-meal; LifeVantage Protandim     HPI         Hypertension Follow-up    Do you check your blood pressure regularly outside of the clinic? Yes   Are you following a low salt diet? No  Are your blood pressures ever more than 140 on the top number (systolic) OR more   than 90 on the bottom number (diastolic), for example 140/90? Yes twice but patient had been on vacation and did not have enough medication, as soon as she had meds refilled blood pressure was within normal range     Has been averaging 120s at home with BP. Ran out on vacation, cut pills in half for four days.     Doing fine on lipitor 80mg. Wondering if her cholesterol is too low - read an article higher risk for dementia. Taking coQ10.     Tried budesonide for lymphocytic colitis. Was sick from it. Went to natural doctor. Angelikaleonor Renner in [place]. Had hair tested. Showed gut inflammation/allergies. Doing digest-a-meal, fat/sugar trim formula, and LifeVantage protandim. Did akua blitz 100 for one month. Has her life back. Making her own kefir.     Having a lot of pressure behind her eyes and harder to see signs until she is close. No eye doc up here. No double vision. Night vision is hard. No floaters. Not painful. No redness. Last eye exam two years ago.           Objective    /85 (BP Location: Right arm, Patient Position: Sitting, Cuff Size: Adult Regular)   Pulse 89   Temp 98.8  F (37.1  C) (Tympanic)   Wt 73.7 kg (162 lb 6 oz)   SpO2 97%   BMI 27.02 kg/m    Body mass index is 27.02 kg/m .    Physical Exam  Constitutional:       General: She is not in acute distress.     Appearance: Normal appearance.   Eyes:      Extraocular Movements: Extraocular movements intact.      Conjunctiva/sclera: Conjunctivae normal.      Pupils: Pupils are equal, round, and reactive to light.   Cardiovascular:      Rate and  Rhythm: Normal rate and regular rhythm.      Heart sounds: No murmur heard.  Pulmonary:      Effort: Pulmonary effort is normal.      Breath sounds: Normal breath sounds. No wheezing, rhonchi or rales.   Musculoskeletal:      Right lower leg: No edema.      Left lower leg: No edema.   Neurological:      General: No focal deficit present.      Mental Status: She is alert and oriented to person, place, and time.   Psychiatric:         Mood and Affect: Mood normal.         Behavior: Behavior normal.            No results found for any visits on 03/13/24.          Signed Electronically by: Virginia Green MD

## 2024-03-22 ENCOUNTER — TRANSFERRED RECORDS (OUTPATIENT)
Dept: HEALTH INFORMATION MANAGEMENT | Facility: CLINIC | Age: 64
End: 2024-03-22

## 2024-04-05 ENCOUNTER — TRANSFERRED RECORDS (OUTPATIENT)
Dept: HEALTH INFORMATION MANAGEMENT | Facility: CLINIC | Age: 64
End: 2024-04-05

## 2024-05-16 DIAGNOSIS — G47.33 OSA (OBSTRUCTIVE SLEEP APNEA): Primary | ICD-10-CM

## 2024-06-25 ENCOUNTER — DOCUMENTATION ONLY (OUTPATIENT)
Dept: OTHER | Facility: CLINIC | Age: 64
End: 2024-06-25

## 2024-07-23 ENCOUNTER — NURSE TRIAGE (OUTPATIENT)
Dept: FAMILY MEDICINE | Facility: OTHER | Age: 64
End: 2024-07-23

## 2024-07-23 ENCOUNTER — TELEPHONE (OUTPATIENT)
Dept: FAMILY MEDICINE | Facility: OTHER | Age: 64
End: 2024-07-23

## 2024-07-23 NOTE — TELEPHONE ENCOUNTER
Symptom or reason needing to speak to RN: Her vision is getting continually worse. She states that she shouldn't be driving and doesn't want it to be a stroke.     Best number to return call: [phone]     Best time to return call: anytime

## 2024-07-23 NOTE — TELEPHONE ENCOUNTER
Reason for Disposition   Blurred vision or visual changes and gradual onset (e.g., weeks, months)    Additional Information   Negative: Weakness of the face, arm or leg on one side of the body   Negative: Followed getting substance in the eye   Negative: Foreign body stuck in the eye   Negative: Followed an eye injury   Negative: Followed sun lamp or sun exposure (UV keratitis)   Negative: Yellow or green discharge (pus) in the eye   Negative: Pregnant   Negative: Complete loss of vision in one or both eyes   Negative: SEVERE eye pain   Negative: SEVERE headache   Negative: Double vision   Negative: Blurred vision or visual changes and present now and sudden onset or new (e.g., minutes, hours, days)  (Exception: Seeing floaters / black specks OR previously diagnosed migraine headaches with same symptoms.)   Negative: Patient sounds very sick or weak to the triager   Negative: Flashes of light  (Exception: Brief from pressing on the eyeball.)   Negative: Many floaters in the eye (Exception: Floater(s) are a chronic symptom and this is unchanged from patient's baseline pattern.)   Negative: Eye pain and brief (now gone) blurred vision or visual changes   Negative: Taking digoxin (e.g., Lanoxin, Digitek, Cardoxin, Lanoxicaps; Toloxin in [place]) and blurred vision, yellow vision, or yellow-green halos   Negative: Jaw pain while eating and age > 50 years   Negative: Headache and age > 50 years   Negative: Patient wants to be seen   Negative: Single floater (i.e., small speck seems to float across the eye)  (Exception: Floater(s) are a chronic symptom and this is unchanged from patient's baseline pattern.)   Negative: Brief (now gone) blurred vision and unexplained   Negative: Laser light exposure and blurred vision present > 15 minutes    Answer Assessment - Initial Assessment Questions  Seen by Eye  per PCP's recommendation  Vision test passed - no change to prescription    Patient states she takes daily medication  "as ordered  BP is well controlled  Bilat blurred vision is constant, worse in the a.m.  Can see shapes but difficult to make out the form  Affecting driving at this point  Denies pain, accident, or injury        1. DESCRIPTION:       Bilat blurred vision      Cannot read street signs    2. LOCATION:       Bilat eyes    3. SEVERITY:       Can see, wearing glasses but forms are blurry    4. ONSET:       Approx 3 months ago    5. PATTERN:       Constanst      Worse in the a.m.        6. PAIN:      Denies pain      But has dad intermittent dizzy spells    7. CONTACTS-GLASSES:       Has glasses    8. CAUSE:       Unknown    9. OTHER SYMPTOMS: \"Do you have any other symptoms?\"       HX of 2x R side stroke - R hand weakness    Protocols used: Vision Loss or Change-A-[place]    "

## 2024-07-25 ENCOUNTER — OFFICE VISIT (OUTPATIENT)
Dept: FAMILY MEDICINE | Facility: OTHER | Age: 64
End: 2024-07-25
Attending: STUDENT IN AN ORGANIZED HEALTH CARE EDUCATION/TRAINING PROGRAM
Payer: COMMERCIAL

## 2024-07-25 VITALS
RESPIRATION RATE: 16 BRPM | TEMPERATURE: 97.7 F | WEIGHT: 166.3 LBS | HEIGHT: 65 IN | OXYGEN SATURATION: 100 % | BODY MASS INDEX: 27.71 KG/M2 | SYSTOLIC BLOOD PRESSURE: 128 MMHG | DIASTOLIC BLOOD PRESSURE: 74 MMHG | HEART RATE: 75 BPM

## 2024-07-25 DIAGNOSIS — M65.30 TRIGGER FINGER, ACQUIRED: ICD-10-CM

## 2024-07-25 DIAGNOSIS — I77.3 FIBROMUSCULAR DYSPLASIA (H): Chronic | ICD-10-CM

## 2024-07-25 DIAGNOSIS — Z86.73 HISTORY OF CVA (CEREBROVASCULAR ACCIDENT): Chronic | ICD-10-CM

## 2024-07-25 DIAGNOSIS — M25.50 ARTHRALGIA, UNSPECIFIED JOINT: ICD-10-CM

## 2024-07-25 DIAGNOSIS — M62.89 MUSCLE FATIGUE: ICD-10-CM

## 2024-07-25 DIAGNOSIS — H53.8 VISION BLURRED: Primary | ICD-10-CM

## 2024-07-25 PROBLEM — Z95.818 STATUS POST PLACEMENT OF IMPLANTABLE LOOP RECORDER: Status: ACTIVE | Noted: 2024-04-09

## 2024-07-25 LAB
ALBUMIN SERPL BCG-MCNC: 4.7 G/DL (ref 3.5–5.2)
ALP SERPL-CCNC: 94 U/L (ref 40–150)
ALT SERPL W P-5'-P-CCNC: 25 U/L (ref 0–50)
ANION GAP SERPL CALCULATED.3IONS-SCNC: 10 MMOL/L (ref 7–15)
AST SERPL W P-5'-P-CCNC: 31 U/L (ref 0–45)
BILIRUB SERPL-MCNC: 0.6 MG/DL
BUN SERPL-MCNC: 12.9 MG/DL (ref 8–23)
CALCIUM SERPL-MCNC: 10.1 MG/DL (ref 8.8–10.4)
CHLORIDE SERPL-SCNC: 101 MMOL/L (ref 98–107)
CK SERPL-CCNC: 169 U/L (ref 26–192)
CREAT SERPL-MCNC: 0.6 MG/DL (ref 0.51–0.95)
CRP SERPL-MCNC: <3 MG/L
EGFRCR SERPLBLD CKD-EPI 2021: >90 ML/MIN/1.73M2
ERYTHROCYTE [SEDIMENTATION RATE] IN BLOOD BY WESTERGREN METHOD: 15 MM/HR (ref 0–30)
GLUCOSE SERPL-MCNC: 106 MG/DL (ref 70–99)
HCO3 SERPL-SCNC: 28 MMOL/L (ref 22–29)
POTASSIUM SERPL-SCNC: 3.7 MMOL/L (ref 3.4–5.3)
PROT SERPL-MCNC: 7.7 G/DL (ref 6.4–8.3)
RHEUMATOID FACT SERPL-ACNC: <10 IU/ML
SODIUM SERPL-SCNC: 139 MMOL/L (ref 135–145)
TSH SERPL DL<=0.005 MIU/L-ACNC: 1.45 UIU/ML (ref 0.3–4.2)

## 2024-07-25 PROCEDURE — 36415 COLL VENOUS BLD VENIPUNCTURE: CPT | Performed by: STUDENT IN AN ORGANIZED HEALTH CARE EDUCATION/TRAINING PROGRAM

## 2024-07-25 PROCEDURE — 86431 RHEUMATOID FACTOR QUANT: CPT | Performed by: STUDENT IN AN ORGANIZED HEALTH CARE EDUCATION/TRAINING PROGRAM

## 2024-07-25 PROCEDURE — G2211 COMPLEX E/M VISIT ADD ON: HCPCS | Performed by: STUDENT IN AN ORGANIZED HEALTH CARE EDUCATION/TRAINING PROGRAM

## 2024-07-25 PROCEDURE — 86039 ANTINUCLEAR ANTIBODIES (ANA): CPT | Performed by: STUDENT IN AN ORGANIZED HEALTH CARE EDUCATION/TRAINING PROGRAM

## 2024-07-25 PROCEDURE — 99215 OFFICE O/P EST HI 40 MIN: CPT | Performed by: STUDENT IN AN ORGANIZED HEALTH CARE EDUCATION/TRAINING PROGRAM

## 2024-07-25 PROCEDURE — 86038 ANTINUCLEAR ANTIBODIES: CPT | Performed by: STUDENT IN AN ORGANIZED HEALTH CARE EDUCATION/TRAINING PROGRAM

## 2024-07-25 PROCEDURE — 86140 C-REACTIVE PROTEIN: CPT | Performed by: STUDENT IN AN ORGANIZED HEALTH CARE EDUCATION/TRAINING PROGRAM

## 2024-07-25 PROCEDURE — 80050 GENERAL HEALTH PANEL: CPT | Performed by: STUDENT IN AN ORGANIZED HEALTH CARE EDUCATION/TRAINING PROGRAM

## 2024-07-25 PROCEDURE — 85652 RBC SED RATE AUTOMATED: CPT | Performed by: STUDENT IN AN ORGANIZED HEALTH CARE EDUCATION/TRAINING PROGRAM

## 2024-07-25 PROCEDURE — 86235 NUCLEAR ANTIGEN ANTIBODY: CPT | Mod: XU | Performed by: STUDENT IN AN ORGANIZED HEALTH CARE EDUCATION/TRAINING PROGRAM

## 2024-07-25 PROCEDURE — 82550 ASSAY OF CK (CPK): CPT | Performed by: STUDENT IN AN ORGANIZED HEALTH CARE EDUCATION/TRAINING PROGRAM

## 2024-07-25 PROCEDURE — 86200 CCP ANTIBODY: CPT | Performed by: STUDENT IN AN ORGANIZED HEALTH CARE EDUCATION/TRAINING PROGRAM

## 2024-07-25 PROCEDURE — 86618 LYME DISEASE ANTIBODY: CPT | Performed by: STUDENT IN AN ORGANIZED HEALTH CARE EDUCATION/TRAINING PROGRAM

## 2024-07-25 PROCEDURE — 86235 NUCLEAR ANTIGEN ANTIBODY: CPT | Performed by: STUDENT IN AN ORGANIZED HEALTH CARE EDUCATION/TRAINING PROGRAM

## 2024-07-25 ASSESSMENT — PAIN SCALES - GENERAL: PAINLEVEL: NO PAIN (0)

## 2024-07-25 NOTE — PROGRESS NOTES
Assessment & Plan     Vision blurred  Ongoing for months, intermittent with previous Optho examination. Normal neurological exam today. ESR, CRP within normal limits.  Intermittent deficits do not fit with a clearly primary central neurological process.  It does seem like more a primary eye disease.  Autoimmune testing in process, see below.  Will plan to update MRI brain with and without contrast and also MRA brain with her history of fibromuscular dysplasia.  Was able to review ophthalmology notes from [facility].  Initial visit 3/22/2024 with diagnoses of dry eye syndrome and samples of drops were given.  Mild nuclear sclerosis but very early cataracts and presbyopia; normal pressures. Had follow-up 4/5/2024 and vision noted to be better but also noted cataracts were mildly starting to affect vision at that appointment. At this point, would recommend follow-up with Dr. Holcomb to reassess cataracts since there was change in the few week difference of eye exams and also he had listed other options including prescription drops to treat her dry eyes.  If no findings on repeat eye exam and no findings on imaging but symptoms persist, consider neurooptho.   Concerning signs and symptoms reviewed that would indicate need for urgent re-evaluation. Patient stated understanding and agreement with the plan.   - Erythrocyte sedimentation rate auto; Future  - CRP inflammation; Future  - MR Brain w/o & w Contrast; Future  - MRA Brain ([facility]) wo & w Contrast; Future  - Erythrocyte sedimentation rate auto  - CRP inflammation    Muscle fatigue  Ongoing months with muscle seeming to fatigue more, especially her arms.  Prior CK level was normal in December 2023.  No evidence for atrophy or weakness today.  Will recheck basic labs and CK level.  Recommend she keep a log of her symptoms, as timetable and occurrence are nonspecific today.  Follow-up in 2 weeks.  - CBC with Platelets & Differential  - Comprehensive  metabolic panel  - Erythrocyte sedimentation rate auto  - CRP inflammation  - CK total  - SSB La KRISTEN Antibody IgG  - SSA Ro KRISTEN Antibody IgG  - Rheumatoid factor  - Cyclic Citrullinated Peptide Antibody IgG  - Anti Nuclear Vivien IgG by IFA with Reflex  - TSH with free T4 reflex  - Lyme Disease Total Antibodies with Reflex to Confirmation    Arthralgia, unspecified joint  Symptoms consistent with possible rheumatoid arthritis and I do question some synovitis on bilateral hands and MCPs today.  Inflammatory markers normal, as are other basic labs.  Will await rheumatoid factor and CCP testing.  Continue with supportive care for now.  - CBC with Platelets & Differential  - Comprehensive metabolic panel  - Erythrocyte sedimentation rate auto  - CRP inflammation  - CK total  - SSB La KRISTEN Antibody IgG  - SSA Ro KRISTEN Antibody IgG  - Rheumatoid factor  - Cyclic Citrullinated Peptide Antibody IgG  - Anti Nuclear Vivien IgG by IFA with Reflex  - TSH with free T4 reflex  - Lyme Disease Total Antibodies with Reflex to Confirmation    History of CVA (cerebrovascular accident)  Fibromuscular dysplasia (H24)  With ongoing eye symptoms, warrants further workup.  Repeating brain MRI to ensure no new CVA and MRA to ensure no aneurysm formation with fibromuscular dysplasia.  - MR Brain w/o & w Contrast; Future  - MRA Brain ([facility]) wo & w Contrast; Future    Trigger finger, acquired  Right middle finger.  Consider treatment options once further evaluation for above problems is completed.        The longitudinal plan of care for the diagnosis(es)/condition(s) as documented were addressed during this visit. Due to the added complexity in care, I will continue to support Jeannie in the subsequent management and with ongoing continuity of care.      44 minutes spent by me on the date of the encounter doing chart review, history and exam, documentation and further activities per the note      Subjective   Jeannie is a 64 year old,  "presenting for the following health issues:  Eye Problem        7/25/2024     8:17 AM   Additional Questions   Roomed by Nasrin Hsu   Accompanied by self         7/25/2024     8:17 AM   Patient Reported Additional Medications   Patient reports taking the following new medications none     History of Present Illness       Reason for visit:  Vision problems    She eats 4 or more servings of fruits and vegetables daily.She consumes 1 sweetened beverage(s) daily.She exercises with enough effort to increase her heart rate 60 or more minutes per day.  She exercises with enough effort to increase her heart rate 5 days per week.   She is taking medications regularly.       Eye(s) Problem  Onset/Duration: a few months   Description:   Location: Bilateral  Pain: No  Redness: No  Accompanying Signs & Symptoms:  Discharge/mattering: No  Swelling: No  Visual changes: YES- can't make out signs or vehicles while driving; funny feeling while driving \"a movement that just doesn't feel right\" wondering if caused from having scruggs   Fever: No  Nasal Congestion: No  Bothered by bright lights: YES- at night while driving lights bother eyes, does not drive at night   History:  Trauma: No  Foreign body exposure: No  Wearing contacts: glasses, wore when younger   Precipitating or alleviating factors: None  Therapies tried and outcome: None    Ongoing 4+ months.   Eyes are not focusing right.   Times where vision will go blurry. Seems to go in and out. No unilateral defect or obvious peripheral vision deficit either medial or temporal.   Peripheral vision feels different. More like she is in a tunnel.   Blinking eyes doesn't help.   Driving vision is definitely worse. Hard to drive at night.   Seen at [facility] - had full eye exam. Slight starting of cataract, nothing major.   Has tried eye drops for dry eye. No difference.   History of stroke. Stopped plavix in sept 23. Did not start after that.   Has had more muscle fatigue in " "arms. Harder to get going in AM. Push mower was harder to do in arms. Almost like her body is exhausted. Feels she is getting \"stiffer and stiffer\". May take an hour to get going. Some swelling in bilateral MCPs. Francisco did have arthritis of MCP and PIPs but not DIPs. Right hand hurts the most. In left hand but not as bad. Uses right hand more. Myalgias were going on in Dec 2023. Joint issues all summer.   No headaches.   No double vision.   Some bouts of dizziness here and there.   No weakness on one side. No word finding trouble.   No rashes.     Trigger finger of middle right finger.         Objective    /74 (BP Location: Right arm, Patient Position: Sitting, Cuff Size: Adult Regular)   Pulse 75   Temp 97.7  F (36.5  C) (Tympanic)   Resp 16   Ht 1.651 m (5' 5\")   Wt 75.4 kg (166 lb 4.8 oz)   SpO2 100%   BMI 27.67 kg/m    Body mass index is 27.67 kg/m .    Physical Exam  Constitutional:       General: She is not in acute distress.     Appearance: Normal appearance. She is not ill-appearing, toxic-appearing or diaphoretic.   HENT:      Head: Normocephalic and atraumatic.      Comments: No temporal artery tenderness.      Right Ear: Tympanic membrane, ear canal and external ear normal.      Left Ear: Tympanic membrane, ear canal and external ear normal.      Nose: Nose normal.      Mouth/Throat:      Mouth: Mucous membranes are moist.      Pharynx: Oropharynx is clear.   Eyes:      General: No visual field deficit.     Extraocular Movements: Extraocular movements intact.      Right eye: No nystagmus.      Left eye: No nystagmus.      Conjunctiva/sclera: Conjunctivae normal.      Pupils: Pupils are equal, round, and reactive to light.      Comments: No carotid skew.  Normal fixed eye test.  Visual fields intact medially and temporally.   Neck:      Thyroid: No thyroid mass or thyromegaly.   Cardiovascular:      Rate and Rhythm: Normal rate and regular rhythm.      Heart sounds: No murmur heard.  Pulmonary: "      Effort: Pulmonary effort is normal.      Breath sounds: Normal breath sounds. No wheezing or rales.   Musculoskeletal:      Cervical back: Full passive range of motion without pain and neck supple. No rigidity or tenderness.      Comments: I do appreciate a bogginess in the third and fourth MCPs.  None in DIPs or PIPs.  This is bilaterally.  There is also a nodule consistent with a trigger finger on the right third finger.  She has 5/5 strength with shoulder abduction, arm flexion, arm extension, wrist flexion and extension, and intrinsic hand muscles with no evidence of atrophy in her upper extremities.  Strength in the lower extremities is also 5/5 and gait is normal.  No evidence for atrophy.   Lymphadenopathy:      Cervical: No cervical adenopathy.   Skin:     General: Skin is warm and dry.      Capillary Refill: Capillary refill takes less than 2 seconds.      Findings: No rash.   Neurological:      Mental Status: She is alert and oriented to person, place, and time. Mental status is at baseline.      Cranial Nerves: Cranial nerves 2-12 are intact. No cranial nerve deficit, dysarthria or facial asymmetry.      Sensory: Sensation is intact. No sensory deficit.      Motor: Motor function is intact. No weakness, tremor or abnormal muscle tone.      Coordination: Coordination is intact. Romberg sign negative. Coordination normal.      Gait: Gait is intact. Gait normal.      Deep Tendon Reflexes: Babinski sign absent on the right side. Babinski sign absent on the left side.      Reflex Scores:       Bicep reflexes are 2+ on the right side and 2+ on the left side.       Patellar reflexes are 2+ on the right side and 3+ on the left side.       Achilles reflexes are 2+ on the right side and 2+ on the left side.     Comments:      Psychiatric:         Mood and Affect: Mood normal.         Behavior: Behavior normal.         Thought Content: Thought content normal.         Judgment: Judgment normal.                 Signed Electronically by: Virginia Green MD

## 2024-07-26 LAB
ANA PAT SER IF-IMP: ABNORMAL
ANA SER QL IF: ABNORMAL
ANA TITR SER IF: ABNORMAL {TITER}
B BURGDOR IGG+IGM SER QL: 0.32
BASOPHILS # BLD AUTO: 0.1 10E3/UL (ref 0–0.2)
BASOPHILS NFR BLD AUTO: 1 %
CCP AB SER IA-ACNC: 0.9 U/ML
ENA SS-A AB SER IA-ACNC: <0.5 U/ML
ENA SS-A AB SER IA-ACNC: NEGATIVE
ENA SS-B IGG SER IA-ACNC: <0.6 U/ML
ENA SS-B IGG SER IA-ACNC: NEGATIVE
EOSINOPHIL # BLD AUTO: 0.1 10E3/UL (ref 0–0.7)
EOSINOPHIL NFR BLD AUTO: 2 %
ERYTHROCYTE [DISTWIDTH] IN BLOOD BY AUTOMATED COUNT: 12.8 % (ref 10–15)
HCT VFR BLD AUTO: 40.4 % (ref 35–47)
HGB BLD-MCNC: 13.6 G/DL (ref 11.7–15.7)
IMM GRANULOCYTES # BLD: 0 10E3/UL
IMM GRANULOCYTES NFR BLD: 0 %
LYMPHOCYTES # BLD AUTO: 1.4 10E3/UL (ref 0.8–5.3)
LYMPHOCYTES NFR BLD AUTO: 25 %
MCH RBC QN AUTO: 35.1 PG (ref 26.5–33)
MCHC RBC AUTO-ENTMCNC: 33.7 G/DL (ref 31.5–36.5)
MCV RBC AUTO: 104 FL (ref 78–100)
MONOCYTES # BLD AUTO: 0.5 10E3/UL (ref 0–1.3)
MONOCYTES NFR BLD AUTO: 9 %
NEUTROPHILS # BLD AUTO: 3.4 10E3/UL (ref 1.6–8.3)
NEUTROPHILS NFR BLD AUTO: 63 %
NRBC # BLD AUTO: 0 10E3/UL
NRBC BLD AUTO-RTO: 0 /100
PLATELET # BLD AUTO: 206 10E3/UL (ref 150–450)
RBC # BLD AUTO: 3.87 10E6/UL (ref 3.8–5.2)
WBC # BLD AUTO: 5.5 10E3/UL (ref 4–11)

## 2024-07-29 NOTE — RESULT ENCOUNTER NOTE
Please call and notify patient that all of her autoimmune testing has come back reassuring so far.  No Sjogren's disease and negative rheumatoid arthritis testing.  DEVAN testing, which is nonspecific was borderline positive, can be an error and false positive.  We will discuss more at her follow-up in August.

## 2024-08-05 ENCOUNTER — TRANSFERRED RECORDS (OUTPATIENT)
Dept: HEALTH INFORMATION MANAGEMENT | Facility: CLINIC | Age: 64
End: 2024-08-05

## 2024-08-12 ENCOUNTER — HOSPITAL ENCOUNTER (OUTPATIENT)
Dept: MRI IMAGING | Facility: HOSPITAL | Age: 64
Discharge: HOME | End: 2024-08-12
Attending: STUDENT IN AN ORGANIZED HEALTH CARE EDUCATION/TRAINING PROGRAM | Admitting: STUDENT IN AN ORGANIZED HEALTH CARE EDUCATION/TRAINING PROGRAM
Payer: COMMERCIAL

## 2024-08-12 DIAGNOSIS — Z86.73 HISTORY OF CVA (CEREBROVASCULAR ACCIDENT): Chronic | ICD-10-CM

## 2024-08-12 DIAGNOSIS — H53.8 VISION BLURRED: ICD-10-CM

## 2024-08-12 DIAGNOSIS — I77.3 FIBROMUSCULAR DYSPLASIA (H): Chronic | ICD-10-CM

## 2024-08-12 PROCEDURE — 70544 MR ANGIOGRAPHY HEAD W/O DYE: CPT | Mod: XU

## 2024-08-12 PROCEDURE — 255N000002 HC RX 255 OP 636: Performed by: RADIOLOGY

## 2024-08-12 PROCEDURE — 70553 MRI BRAIN STEM W/O & W/DYE: CPT

## 2024-08-12 PROCEDURE — A9585 GADOBUTROL INJECTION: HCPCS | Performed by: RADIOLOGY

## 2024-08-12 RX ORDER — GADOBUTROL 604.72 MG/ML
7.5 INJECTION INTRAVENOUS ONCE
Status: COMPLETED | OUTPATIENT
Start: 2024-08-12 | End: 2024-08-12

## 2024-08-12 RX ADMIN — GADOBUTROL 7.5 ML: 604.72 INJECTION INTRAVENOUS at 11:04

## 2024-08-12 NOTE — RESULT ENCOUNTER NOTE
Please call and notify patient that her MRI of her brain and MRA of her brain were reassuring with no masses, abnormal blood flow, signs of new stroke, or aneurysm.  All is very reassuring.  We can discuss more at her follow-up later this week.

## 2024-08-16 ENCOUNTER — OFFICE VISIT (OUTPATIENT)
Dept: FAMILY MEDICINE | Facility: OTHER | Age: 64
End: 2024-08-16
Attending: STUDENT IN AN ORGANIZED HEALTH CARE EDUCATION/TRAINING PROGRAM
Payer: COMMERCIAL

## 2024-08-16 VITALS
BODY MASS INDEX: 27.88 KG/M2 | DIASTOLIC BLOOD PRESSURE: 72 MMHG | HEIGHT: 65 IN | HEART RATE: 77 BPM | SYSTOLIC BLOOD PRESSURE: 110 MMHG | OXYGEN SATURATION: 98 % | WEIGHT: 167.3 LBS | TEMPERATURE: 98.3 F

## 2024-08-16 DIAGNOSIS — E78.2 MIXED HYPERLIPIDEMIA: ICD-10-CM

## 2024-08-16 DIAGNOSIS — H53.8 VISION BLURRED: Primary | ICD-10-CM

## 2024-08-16 DIAGNOSIS — M25.50 ARTHRALGIA, UNSPECIFIED JOINT: ICD-10-CM

## 2024-08-16 DIAGNOSIS — R76.8 POSITIVE ANA (ANTINUCLEAR ANTIBODY): ICD-10-CM

## 2024-08-16 DIAGNOSIS — M62.89 MUSCLE FATIGUE: ICD-10-CM

## 2024-08-16 LAB
CHOLEST SERPL-MCNC: 171 MG/DL
FASTING STATUS PATIENT QL REPORTED: NO
HDLC SERPL-MCNC: 90 MG/DL
LDLC SERPL CALC-MCNC: 68 MG/DL
NONHDLC SERPL-MCNC: 81 MG/DL
TRIGL SERPL-MCNC: 66 MG/DL
VIT B12 SERPL-MCNC: 521 PG/ML (ref 232–1245)
VIT D+METAB SERPL-MCNC: 55 NG/ML (ref 20–50)

## 2024-08-16 PROCEDURE — 36415 COLL VENOUS BLD VENIPUNCTURE: CPT | Performed by: STUDENT IN AN ORGANIZED HEALTH CARE EDUCATION/TRAINING PROGRAM

## 2024-08-16 PROCEDURE — G2211 COMPLEX E/M VISIT ADD ON: HCPCS | Performed by: STUDENT IN AN ORGANIZED HEALTH CARE EDUCATION/TRAINING PROGRAM

## 2024-08-16 PROCEDURE — 80061 LIPID PANEL: CPT | Performed by: STUDENT IN AN ORGANIZED HEALTH CARE EDUCATION/TRAINING PROGRAM

## 2024-08-16 PROCEDURE — 86160 COMPLEMENT ANTIGEN: CPT | Performed by: STUDENT IN AN ORGANIZED HEALTH CARE EDUCATION/TRAINING PROGRAM

## 2024-08-16 PROCEDURE — 86225 DNA ANTIBODY NATIVE: CPT | Performed by: STUDENT IN AN ORGANIZED HEALTH CARE EDUCATION/TRAINING PROGRAM

## 2024-08-16 PROCEDURE — 99214 OFFICE O/P EST MOD 30 MIN: CPT | Performed by: STUDENT IN AN ORGANIZED HEALTH CARE EDUCATION/TRAINING PROGRAM

## 2024-08-16 PROCEDURE — 82607 VITAMIN B-12: CPT | Performed by: STUDENT IN AN ORGANIZED HEALTH CARE EDUCATION/TRAINING PROGRAM

## 2024-08-16 PROCEDURE — 86160 COMPLEMENT ANTIGEN: CPT | Mod: XU | Performed by: STUDENT IN AN ORGANIZED HEALTH CARE EDUCATION/TRAINING PROGRAM

## 2024-08-16 PROCEDURE — 82306 VITAMIN D 25 HYDROXY: CPT | Performed by: STUDENT IN AN ORGANIZED HEALTH CARE EDUCATION/TRAINING PROGRAM

## 2024-08-16 ASSESSMENT — PAIN SCALES - GENERAL: PAINLEVEL: MILD PAIN (3)

## 2024-08-16 NOTE — PATIENT INSTRUCTIONS
Stop Lipitor for one week and monitor for improvement. If feeling better, will need to stop and consider alternate such as Crestor.   Will get notes from [facility].

## 2024-08-16 NOTE — PROGRESS NOTES
I faxed an urgent request to [facility] to send us the patients last progress/ clinical notes so her pcp can review, I will also be calling to make sure they will fax this over

## 2024-08-16 NOTE — PROGRESS NOTES
Assessment & Plan     Vision blurred  See note 7/25/2024.  Ongoing issue.  Minimal change in symptoms from last visit.  Reassuringly normal MRA brain and MRI brain.  Had follow-up with ophthalmology 8/3/2024, will get notes.  May need to reach out to them versus consider trial of alternate eyedrop pending note review and per last notes from April.    Muscle fatigue  Persistent with negative rheumatoid workup, normal ESR and CRP at last visit.  Nonspecific and quite diffuse.  Ongoing over 9 months.  Will plan to trial stopping high-dose statin, Lipitor, for 1 week and see if improvement.  Did discuss with her CVA history, high risk for repeat CVA and should be on statin lowering medication.  Ideally could try Crestor 10 mg, increase to 20 mg then if pain improves and we need to .  AST, ALT, and CK level have been normal.  Will also update vitamin D and B12 levels.  If pain does not change with stopping statin, may want to consider EMG testing.  - Vitamin D Deficiency; Future  - Vitamin B12; Future  - Vitamin D Deficiency  - Vitamin B12    Arthralgia, unspecified joint  Denies specific arthralgia at this point.  Predominantly muscle type pain.    Positive DEVAN (antinuclear antibody)  Borderline positive.  Will check double-stranded DNA, complement levels.  ESR, CRP normal.  SSA and SSB normal.  RF and CCP normal.  May have been false positive.  Consider recheck down the line if other testing normal today.  - DNA double stranded antibodies; Future  - Complement C3; Future  - Complement C4; Future  - DNA double stranded antibodies  - Complement C3  - Complement C4    Mixed hyperlipidemia  Will plan to update now for physical labs.  Physical planned next month.  LDL goal less than 70.  Currently is 68 on fasting labs today. At goal.   - Lipid Profile (Chol, Trig, HDL, LDL calc); Future  - Lipid Profile (Chol, Trig, HDL, LDL calc)      The longitudinal plan of care for the diagnosis(es)/condition(s) as  documented were addressed during this visit. Due to the added complexity in care, I will continue to support Jeannie in the subsequent management and with ongoing continuity of care.    Subjective   Jeannie is a 64 year old, presenting for the following health issues:  Eye Problem        8/16/2024     8:47 AM   Additional Questions   Roomed by Edita Vang LPN   Accompanied by self     History of Present Illness       Reason for visit:  Vision problems    She eats 2-3 servings of fruits and vegetables daily.She consumes 1 sweetened beverage(s) daily.She exercises with enough effort to increase her heart rate 30 to 60 minutes per day.  She exercises with enough effort to increase her heart rate 6 days per week.   She is taking medications regularly.       Eye(s) Problem/ vision   Onset/Duration: several months ago   Description:   Location: Bilateral  Pain: No  Redness: No  Accompanying Signs & Symptoms:  Discharge/mattering: No  Swelling: No  Visual changes: YES- blurry, unable to read road signs  Fever: No  Nasal Congestion: No  Bothered by bright lights: YES- but wears sun glasses   History:  Trauma: No  Foreign body exposure: No  Wearing contacts: No  Precipitating or alleviating factors: None  Therapies tried and outcome: MRI, eye clinic   Has blurry vision all the time but really notices it when driving.     Went back to eye clinic - saw colleague of Dr Holcomb.   Hasn't heard anything back. 8/5/2024.   Coming in today, couldn't read signs. Very blurry wall signs. Can read but blurry. Can't see signs until right up in front of her.   Changed up cpap to see if it has been causing increased dry eyes, no difference  yet.   No other neurological symptoms. Has not felt like she had a stroke.   MRA and MR reassuring  SSA and SSB normal     Myalgias continue. In the morning, whole body feels stiff. Once she gets moving, it gets better. With push mower, arms and legs hurt terribly. ESR, CRP,CK normal last month. This  "has been ongoing 9 months. Morning stiffness lasts only 5-10 minutes, once she works them/gets them going they're better. No weakness now but when doing things, like mowing, will feel weak. Whole arm, not just proximal.     Hand joint still hurt. More overall stiffness. Feels like hand muscles more so.           Objective    /72 (BP Location: Right arm, Patient Position: Sitting, Cuff Size: Adult Large)   Pulse 77   Temp 98.3  F (36.8  C) (Tympanic)   Ht 1.651 m (5' 5\")   Wt 75.9 kg (167 lb 4.8 oz)   SpO2 98%   BMI 27.84 kg/m    Body mass index is 27.84 kg/m .    Physical Exam  Constitutional:       General: She is not in acute distress.     Appearance: Normal appearance.   Eyes:      Extraocular Movements: Extraocular movements intact.      Conjunctiva/sclera: Conjunctivae normal.      Pupils: Pupils are equal, round, and reactive to light.   Cardiovascular:      Rate and Rhythm: Normal rate and regular rhythm.      Heart sounds: No murmur heard.  Pulmonary:      Effort: Pulmonary effort is normal.      Breath sounds: Normal breath sounds. No wheezing, rhonchi or rales.   Musculoskeletal:      Right lower leg: No edema.      Left lower leg: No edema.   Neurological:      General: No focal deficit present.      Mental Status: She is alert and oriented to person, place, and time.      Cranial Nerves: No cranial nerve deficit.      Gait: Gait normal.   Psychiatric:         Mood and Affect: Mood normal.         Behavior: Behavior normal.          Results for orders placed or performed in visit on 08/16/24   Lipid Profile (Chol, Trig, HDL, LDL calc)     Status: None   Result Value Ref Range    Cholesterol 171 <200 mg/dL    Triglycerides 66 <150 mg/dL    Direct Measure HDL 90 >=50 mg/dL    LDL Cholesterol Calculated 68 <=100 mg/dL    Non HDL Cholesterol 81 <130 mg/dL    Patient Fasting > 8hrs? No     Narrative    Cholesterol  Desirable:  <200 mg/dL    Triglycerides  Normal:  Less than 150 mg/dL  Borderline " High:  150-199 mg/dL  High:  200-499 mg/dL  Very High:  Greater than or equal to 500 mg/dL    Direct Measure HDL  Female:  Greater than or equal to 50 mg/dL   Male:  Greater than or equal to 40 mg/dL    LDL Cholesterol  Desirable:  <100mg/dL  Above Desirable:  100-129 mg/dL   Borderline High:  130-159 mg/dL   High:  160-189 mg/dL   Very High:  >= 190 mg/dL    Non HDL Cholesterol  Desirable:  130 mg/dL  Above Desirable:  130-159 mg/dL  Borderline High:  160-189 mg/dL  High:  190-219 mg/dL  Very High:  Greater than or equal to 220 mg/dL       PROCEDURE: MRA BRAIN ([facility]) W/O CONTRAST 8/12/2024 11:22  AM     HISTORY: nonspecific vision changes, h/o cva, fibromuscular dysplasia;  Vision blurred; History of CVA (cerebrovascular accident);  Fibromuscular dysplasia (H24)     COMPARISONS: None.     Meds/Dose Given:     TECHNIQUE: MRI the base of the brain using 3-D time-of-flight method     FINDINGS: The left vertebral artery ends in the left posterior  inferior cerebellar artery. The right vertebral artery supplies the  basilar artery. Basilar artery is widely patent. The superior  cerebellar arteries are widely patent. The right posterior cerebral  artery is predominantly filled via the posterior communicating artery.  Basilar artery is the predominant blood supply of the left posterior  cerebral artery. The carotid siphons are widely patent. The  supraclinoid internal carotid arteries are normal. Both anterior and  middle cerebral arteries appear normal. No intracranial stenoses or  occlusions or aneurysms are seen.                                                                        IMPRESSION: No hemodynamically significant stenoses occlusions or  aneurysms are seen.     GAVINO BRADSHAW MD        HISTORY: h/o cva, nonspecific vision changes, fibromuscular dysplasia;  Vision blurred; History of CVA (cerebrovascular accident);  Fibromuscular dysplasia (H24)     COMPARISONS: None.     Meds/Dose Given:      TECHNIQUE: Images were obtained sagittally T1 weighted. Images were  obtained axially diffusion FLAIR and gradient echo T1 and T2 weighted.  Images were obtained axially sagittally coronally T1 weighted  following gadolinium administration     FINDINGS: There are no acute infarcts. The ventricular system is  normal in size. There are scattered foci of bright signal in the white  matter both cerebral hemispheres which is stable from previous  examination in May 2023 there are no enhancing masses ventricular  shifts or extracerebral collections. Brainstem and cerebellum appear  normal. The pituitary and optic chiasm are normal. Basal cisterns and  internal auditory canals are normal. The cranial vault is intact.                                                                        IMPRESSION: No acute brain infarcts.     Small vessel changes in both cerebral hemispheres.     No enhancing masses or ventricular shifts.     GAVINO BRADSHAW MD                Signed Electronically by: Virginia Green MD

## 2024-08-19 LAB
C3 SERPL-MCNC: 142 MG/DL (ref 81–157)
C4 SERPL-MCNC: 29 MG/DL (ref 13–39)
DSDNA AB SER-ACNC: 1.1 IU/ML

## 2024-08-19 NOTE — RESULT ENCOUNTER NOTE
Please call and notify patient that her vitamin D level is normal, actually on the upper end.  All other autoimmune testing with double-stranded DNA and C3/C4 is normal.  Vitamin B12 is normal.

## 2024-10-21 ENCOUNTER — LAB (OUTPATIENT)
Dept: LAB | Facility: OTHER | Age: 64
End: 2024-10-21
Attending: STUDENT IN AN ORGANIZED HEALTH CARE EDUCATION/TRAINING PROGRAM
Payer: COMMERCIAL

## 2024-10-21 ENCOUNTER — OFFICE VISIT (OUTPATIENT)
Dept: FAMILY MEDICINE | Facility: OTHER | Age: 64
End: 2024-10-21
Attending: STUDENT IN AN ORGANIZED HEALTH CARE EDUCATION/TRAINING PROGRAM
Payer: COMMERCIAL

## 2024-10-21 VITALS
TEMPERATURE: 98.7 F | BODY MASS INDEX: 27.86 KG/M2 | RESPIRATION RATE: 17 BRPM | OXYGEN SATURATION: 97 % | HEART RATE: 84 BPM | WEIGHT: 167.2 LBS | HEIGHT: 65 IN | DIASTOLIC BLOOD PRESSURE: 64 MMHG | SYSTOLIC BLOOD PRESSURE: 110 MMHG

## 2024-10-21 DIAGNOSIS — Z12.31 BREAST CANCER SCREENING BY MAMMOGRAM: ICD-10-CM

## 2024-10-21 DIAGNOSIS — I10 ESSENTIAL HYPERTENSION: Chronic | ICD-10-CM

## 2024-10-21 DIAGNOSIS — Z00.00 ROUTINE GENERAL MEDICAL EXAMINATION AT A HEALTH CARE FACILITY: Primary | ICD-10-CM

## 2024-10-21 DIAGNOSIS — R79.89 ELEVATED SERUM CREATININE: ICD-10-CM

## 2024-10-21 DIAGNOSIS — L57.0 ACTINIC KERATOSIS: ICD-10-CM

## 2024-10-21 DIAGNOSIS — Z85.828 HISTORY OF BASAL CELL CARCINOMA: ICD-10-CM

## 2024-10-21 DIAGNOSIS — M62.89 MUSCLE FATIGUE: ICD-10-CM

## 2024-10-21 DIAGNOSIS — E78.2 MIXED HYPERLIPIDEMIA: Chronic | ICD-10-CM

## 2024-10-21 DIAGNOSIS — Z78.0 ASYMPTOMATIC MENOPAUSAL STATE: ICD-10-CM

## 2024-10-21 PROBLEM — I70.1 RENAL ARTERY STENOSIS (H): Chronic | Status: RESOLVED | Noted: 2023-09-12 | Resolved: 2024-10-21

## 2024-10-21 PROBLEM — Z95.818 STATUS POST PLACEMENT OF IMPLANTABLE LOOP RECORDER: Status: RESOLVED | Noted: 2024-04-09 | Resolved: 2024-10-21

## 2024-10-21 LAB
ANION GAP SERPL CALCULATED.3IONS-SCNC: 10 MMOL/L (ref 7–15)
BUN SERPL-MCNC: 15.7 MG/DL (ref 8–23)
CALCIUM SERPL-MCNC: 9.7 MG/DL (ref 8.8–10.4)
CHLORIDE SERPL-SCNC: 97 MMOL/L (ref 98–107)
CREAT SERPL-MCNC: 1.03 MG/DL (ref 0.51–0.95)
EGFRCR SERPLBLD CKD-EPI 2021: 61 ML/MIN/1.73M2
GLUCOSE SERPL-MCNC: 113 MG/DL (ref 70–99)
HCO3 SERPL-SCNC: 29 MMOL/L (ref 22–29)
POTASSIUM SERPL-SCNC: 4.2 MMOL/L (ref 3.4–5.3)
SODIUM SERPL-SCNC: 136 MMOL/L (ref 135–145)

## 2024-10-21 PROCEDURE — 80048 BASIC METABOLIC PNL TOTAL CA: CPT

## 2024-10-21 PROCEDURE — 99396 PREV VISIT EST AGE 40-64: CPT | Mod: 25 | Performed by: STUDENT IN AN ORGANIZED HEALTH CARE EDUCATION/TRAINING PROGRAM

## 2024-10-21 PROCEDURE — 36415 COLL VENOUS BLD VENIPUNCTURE: CPT

## 2024-10-21 PROCEDURE — 17003 DESTRUCT PREMALG LES 2-14: CPT | Performed by: STUDENT IN AN ORGANIZED HEALTH CARE EDUCATION/TRAINING PROGRAM

## 2024-10-21 PROCEDURE — 99214 OFFICE O/P EST MOD 30 MIN: CPT | Mod: 25 | Performed by: STUDENT IN AN ORGANIZED HEALTH CARE EDUCATION/TRAINING PROGRAM

## 2024-10-21 PROCEDURE — 17000 DESTRUCT PREMALG LESION: CPT | Performed by: STUDENT IN AN ORGANIZED HEALTH CARE EDUCATION/TRAINING PROGRAM

## 2024-10-21 RX ORDER — FLUOCINONIDE GEL 0.5 MG/G
GEL TOPICAL PRN
Qty: 30 G | Refills: 0 | Status: CANCELLED | OUTPATIENT
Start: 2024-10-21

## 2024-10-21 RX ORDER — ROSUVASTATIN CALCIUM 10 MG/1
10 TABLET, COATED ORAL DAILY
Qty: 30 TABLET | Refills: 0 | Status: SHIPPED | OUTPATIENT
Start: 2024-10-21

## 2024-10-21 RX ORDER — TAZAROTENE 0.5 MG/G
GEL TOPICAL PRN
Qty: 30 G | Refills: 0 | Status: CANCELLED | OUTPATIENT
Start: 2024-10-21

## 2024-10-21 SDOH — HEALTH STABILITY: PHYSICAL HEALTH: ON AVERAGE, HOW MANY MINUTES DO YOU ENGAGE IN EXERCISE AT THIS LEVEL?: 60 MIN

## 2024-10-21 SDOH — HEALTH STABILITY: PHYSICAL HEALTH: ON AVERAGE, HOW MANY DAYS PER WEEK DO YOU ENGAGE IN MODERATE TO STRENUOUS EXERCISE (LIKE A BRISK WALK)?: 5 DAYS

## 2024-10-21 ASSESSMENT — ANXIETY QUESTIONNAIRES
GAD7 TOTAL SCORE: 0
8. IF YOU CHECKED OFF ANY PROBLEMS, HOW DIFFICULT HAVE THESE MADE IT FOR YOU TO DO YOUR WORK, TAKE CARE OF THINGS AT HOME, OR GET ALONG WITH OTHER PEOPLE?: NOT DIFFICULT AT ALL
5. BEING SO RESTLESS THAT IT IS HARD TO SIT STILL: NOT AT ALL
GAD7 TOTAL SCORE: 0
GAD7 TOTAL SCORE: 0
6. BECOMING EASILY ANNOYED OR IRRITABLE: NOT AT ALL
IF YOU CHECKED OFF ANY PROBLEMS ON THIS QUESTIONNAIRE, HOW DIFFICULT HAVE THESE PROBLEMS MADE IT FOR YOU TO DO YOUR WORK, TAKE CARE OF THINGS AT HOME, OR GET ALONG WITH OTHER PEOPLE: NOT DIFFICULT AT ALL
4. TROUBLE RELAXING: NOT AT ALL
2. NOT BEING ABLE TO STOP OR CONTROL WORRYING: NOT AT ALL
3. WORRYING TOO MUCH ABOUT DIFFERENT THINGS: NOT AT ALL
7. FEELING AFRAID AS IF SOMETHING AWFUL MIGHT HAPPEN: NOT AT ALL
7. FEELING AFRAID AS IF SOMETHING AWFUL MIGHT HAPPEN: NOT AT ALL
1. FEELING NERVOUS, ANXIOUS, OR ON EDGE: NOT AT ALL

## 2024-10-21 ASSESSMENT — PATIENT HEALTH QUESTIONNAIRE - PHQ9
SUM OF ALL RESPONSES TO PHQ QUESTIONS 1-9: 0
SUM OF ALL RESPONSES TO PHQ QUESTIONS 1-9: 0
10. IF YOU CHECKED OFF ANY PROBLEMS, HOW DIFFICULT HAVE THESE PROBLEMS MADE IT FOR YOU TO DO YOUR WORK, TAKE CARE OF THINGS AT HOME, OR GET ALONG WITH OTHER PEOPLE: NOT DIFFICULT AT ALL

## 2024-10-21 ASSESSMENT — SOCIAL DETERMINANTS OF HEALTH (SDOH): HOW OFTEN DO YOU GET TOGETHER WITH FRIENDS OR RELATIVES?: MORE THAN THREE TIMES A WEEK

## 2024-10-21 ASSESSMENT — PAIN SCALES - GENERAL: PAINLEVEL: NO PAIN (0)

## 2024-10-21 NOTE — PROGRESS NOTES
Preventive Care Visit  [facility]  Virginia Green MD, Family Medicine  Oct 21, 2024      Assessment & Plan     Routine general medical examination at a health care facility  Healthcare Maintenance  - Mammogram: due, agreeable  - PAP: Status post full hysterectomy.  - Colon cancer screening: cologuard neg 8/14/23, rpt 2026  - DEXA: higher risk with menopause earlier and alcohol use  - Lung cancer screening: non smoker   - Lipids: up to date 8/2024; muscle fatigue on atorvastatin   - declines flu and covid shot     Essential hypertension  Well-controlled.  Recommend she restart home monitoring.  May be overcontrolled now and able to wean some of her blood pressure medication.  If consistently less than 115 systolic, would reduce hydrochlorothiazide to 12.5 mg daily.  She will reach out if this is the case.  BMP did have slight increase in creatinine, question if this is related to low blood pressure or some dehydration.  Follow-up 6 months, sooner if concerns.  - Basic metabolic panel; Future    Mixed hyperlipidemia / Muscle fatigue  Well-controlled on atorvastatin, however has significant myalgias which resolved within a week after stopping atorvastatin.  With her history of CVA, recommend trial of alternate statin.  Will start with Crestor 10 mg once daily.  If tolerating, in 1 month she will reach out and we will increase to 20 mg.  If side effects occur, we could consider pravastatin.  Recommend trial of at least 3 statins prior to discontinuing due to side effects with her CVA history.  - rosuvastatin (CRESTOR) 10 MG tablet; Take 1 tablet (10 mg) by mouth daily.    History of basal cell carcinoma  Reportedly on back, diagnosed on biopsy through local [facility] dermatology.    Actinic keratosis  2 actinic keratoses noted on her forehead, both treated today with cryotherapy.  Reportedly a similar lesion was treated on her forehead and there is still scale but I did not appreciate atypical vascular  "features under dermoscopy.  Recommend she follow-up with them to determine if further treatment is warranted and if that is the precise area of the lesion that was treated a few months ago.  Reviewed if lesions do not resolve in 3 weeks, needs to follow-up for biopsy.  - DESTRUCT PREMALIGNANT LESION, FIRST  - DESTRUCT PREMALIGNANT LESION, 2-14    Breast cancer screening by mammogram  - MA Screen Bilateral w/Justin; Future    Asymptomatic menopausal state  High risk with history of alcohol use, lower BMI, has been menopausal for at least 10 years.  Will update DEXA scan now.  She would be interested in medication.  - DX Bone Density; Future    Elevated serum creatinine  Although technically just above normal range, it is a significant increase in her creatinine over the last few months.  Lab appointment to check creatinine and UA in 1 week  Encouraged hydration  - Creatinine; Future  - UA with Microscopic reflex to Culture - [place]; Future        BMI  Estimated body mass index is 27.82 kg/m  as calculated from the following:    Height as of this encounter: 1.651 m (5' 5\").    Weight as of this encounter: 75.8 kg (167 lb 3.2 oz).   Weight management plan: Discussed healthy diet and exercise guidelines    Counseling  Appropriate preventive services were addressed with this patient via screening, questionnaire, or discussion as appropriate for fall prevention, nutrition, physical activity, Tobacco-use cessation, social engagement, weight loss and cognition.  Checklist reviewing preventive services available has been given to the patient.  Reviewed patient's diet, addressing concerns and/or questions.   The patient reports drinking more than 3 alcoholic drinks per day and/or more than 7 drhnks per week. The patient was counseled and given information about possible harmful effects of excessive alcohol intake.      Return in about 6 months (around 4/21/2025) for 6mo HTN.    Shaun Dennis is a 64 year old, presenting " for the following:  Physical        10/21/2024     2:00 PM   Additional Questions   Roomed by Nasrin Hsu   Accompanied by self         10/21/2024     2:00 PM   Patient Reported Additional Medications   Patient reports taking the following new medications none        Health Care Directive  Patient has a Health Care Directive on file  Advance care planning document is on file and is current.    HPI  Healthcare Maintenance  - Mammogram: due, agreeable  - PAP: Status post full hysterectomy.  - Colon cancer screening: cologuard neg 8/14/23, rpt 2026  - DEXA: higher risk with menopause earlier and alcohol use  - Lung cancer screening: non smoker   - Lipids: up to date 8/2024; muscle fatigue on atorvastatin   - declines flu and covid shot     Muscle pain gone since stopping statin - atorvastatin.   Unsure if side effects on crestor in the past. ?neuropathy symptoms. Nothing documented in chart.         10/21/2024   General Health   How would you rate your overall physical health? Good   Feel stress (tense, anxious, or unable to sleep) Not at all            10/21/2024   Nutrition   Three or more servings of calcium each day? Yes   Diet: Regular (no restrictions)   How many servings of fruit and vegetables per day? (!) 2-3   How many sweetened beverages each day? 0-1            10/21/2024   Exercise   Days per week of moderate/strenous exercise 5 days   Average minutes spent exercising at this level 60 min            10/21/2024   Social Factors   Frequency of gathering with friends or relatives More than three times a week   Worry food won't last until get money to buy more No   Food not last or not have enough money for food? No   Do you have housing? (Housing is defined as stable permanent housing and does not include staying ouside in a car, in a tent, in an abandoned building, in an overnight shelter, or couch-surfing.) Yes   Are you worried about losing your housing? No   Lack of transportation? No   Unable to get  utilities (heat,electricity)? No            10/21/2024   Fall Risk   Fallen 2 or more times in the past year? No   Trouble with walking or balance? No             10/21/2024   Dental   Dentist two times every year? Yes            10/21/2024   TB Screening   Were you born outside of the [place]? No          Today's PHQ-9 Score:       10/21/2024     7:59 AM   PHQ-9 SCORE   PHQ-9 Total Score MyChart 0   PHQ-9 Total Score 0         10/21/2024   Substance Use   Alcohol more than 3/day or more than 7/wk Yes   How often do you have a drink containing alcohol 2 to 3 times a week   How many alcohol drinks on typical day 3 or 4   How often do you have 5+ drinks at one occasion Weekly   Audit 2/3 Score 4   How often not able to stop drinking once started Never   How often failed to do what normally expected Never   How often needed first drink in am after a heavy drinking session Never   How often feeling of guilt or remorse after drinking Never   How often unable to remember what happened the night before Never   Have you or someone else been injured because of your drinking No   Has anyone been concerned or suggested you cut down on drinking No   TOTAL SCORE - AUDIT 7   Do you use any other substances recreationally? No        Social History     Tobacco Use    Smoking status: Never    Smokeless tobacco: Never   Vaping Use    Vaping status: Never Used   Substance Use Topics    Alcohol use: Yes     Comment: 2 night    Drug use: No           9/26/2023   LAST FHS-7 RESULTS   1st degree relative breast or ovarian cancer Yes   Any relative bilateral breast cancer Yes   Any male have breast cancer No   Any ONE woman have BOTH breast AND ovarian cancer Yes   2 or more relatives with breast AND/OR ovarian cancer Yes   2 or more relatives with breast AND/OR bowel cancer Yes        Mammogram Screening - Mammogram every 1-2 years updated in Health Maintenance based on mutual decision making          10/21/2024   One time HIV Screening  "  Previous HIV test? I don't know          10/21/2024   STI Screening   New sexual partner(s) since last STI/HIV test? No        History of abnormal Pap smear: Status post hysterectomy with removal of cervix and no history of CIN2 or greater or cervical cancer. Health Maintenance and Surgical History updated.       ASCVD Risk   The ASCVD Risk score (Arthur RIVERA, et al., 2019) failed to calculate for the following reasons:    The patient has a prior MI or stroke diagnosis      Reviewed and updated as needed this visit by Provider   Tobacco   Meds  Problems  Med Hx  Surg Hx  Fam Hx  Soc Hx Sexual   Activity                 Objective    Exam  /64 (BP Location: Left arm, Patient Position: Sitting, Cuff Size: Adult Regular)   Pulse 84   Temp 98.7  F (37.1  C) (Tympanic)   Resp 17   Ht 1.651 m (5' 5\")   Wt 75.8 kg (167 lb 3.2 oz)   SpO2 97%   BMI 27.82 kg/m     Estimated body mass index is 27.82 kg/m  as calculated from the following:    Height as of this encounter: 1.651 m (5' 5\").    Weight as of this encounter: 75.8 kg (167 lb 3.2 oz).    Physical Exam  Constitutional:       General: She is not in acute distress.     Appearance: Normal appearance. She is well-developed. She is not ill-appearing.   HENT:      Head: Normocephalic and atraumatic.      Right Ear: Tympanic membrane and external ear normal.      Left Ear: Tympanic membrane and external ear normal.      Nose: Nose normal.      Mouth/Throat:      Mouth: Mucous membranes are moist.      Pharynx: No oropharyngeal exudate.   Eyes:      Extraocular Movements: Extraocular movements intact.      Conjunctiva/sclera: Conjunctivae normal.   Neck:      Thyroid: No thyroid mass, thyromegaly or thyroid tenderness.   Cardiovascular:      Rate and Rhythm: Normal rate and regular rhythm.      Pulses: Normal pulses.      Heart sounds: Normal heart sounds, S1 normal and S2 normal. No murmur heard.  Pulmonary:      Effort: Pulmonary effort is normal. " No respiratory distress.      Breath sounds: Normal breath sounds. No wheezing, rhonchi or rales.   Chest:   Breasts:     Right: Normal. No inverted nipple, mass, skin change or tenderness.      Left: Normal. No inverted nipple, mass, skin change or tenderness.   Abdominal:      General: Bowel sounds are normal. There is no abdominal bruit.      Palpations: Abdomen is soft. There is no mass or pulsatile mass.      Tenderness: There is no abdominal tenderness.   Musculoskeletal:         General: Normal range of motion.      Cervical back: Neck supple.      Right lower leg: No edema.      Left lower leg: No edema.   Lymphadenopathy:      Cervical: No cervical adenopathy.      Upper Body:      Right upper body: No supraclavicular or axillary adenopathy.      Left upper body: No supraclavicular or axillary adenopathy.   Skin:     General: Skin is warm and dry.      Capillary Refill: Capillary refill takes less than 2 seconds.      Findings: No rash.      Comments: Erythematous scaling lesion on right forehead above right brow and smaller similar lesion on left forehead above brow. Both with atypical vascular features under dermoscopy.   Neurological:      Mental Status: She is alert and oriented to person, place, and time.      Gait: Gait is intact.   Psychiatric:         Attention and Perception: Attention normal.         Mood and Affect: Mood normal.         Speech: Speech normal.         Behavior: Behavior normal.         Thought Content: Thought content normal.         Cognition and Memory: Cognition normal.         Judgment: Judgment normal.           Results for orders placed or performed in visit on 10/21/24   Basic metabolic panel     Status: Abnormal   Result Value Ref Range    Sodium 136 135 - 145 mmol/L    Potassium 4.2 3.4 - 5.3 mmol/L    Chloride 97 (L) 98 - 107 mmol/L    Carbon Dioxide (CO2) 29 22 - 29 mmol/L    Anion Gap 10 7 - 15 mmol/L    Urea Nitrogen 15.7 8.0 - 23.0 mg/dL    Creatinine 1.03 (H) 0.51 -  0.95 mg/dL    GFR Estimate 61 >60 mL/min/1.73m2    Calcium 9.7 8.8 - 10.4 mg/dL    Glucose 113 (H) 70 - 99 mg/dL             Signed Electronically by: Virginia Green MD    Answers submitted by the patient for this visit:  Patient Health Questionnaire (Submitted on 10/21/2024)  If you checked off any problems, how difficult have these problems made it for you to do your work, take care of things at home, or get along with other people?: Not difficult at all  PHQ9 TOTAL SCORE: 0  Patient Health Questionnaire (G7) (Submitted on 10/21/2024)  CINDY 7 TOTAL SCORE: 0

## 2024-10-21 NOTE — PATIENT INSTRUCTIONS
Try Crestor 10mg once daily. If doing well at one month, could increase to 20mg once daily. If you do not tolerate, let me know. Could try pravastatin.     Check urine today.   Check BP at home - if 110 systolic or less regularly, let me know and we will reduce dose   Recheck kidney test next week. Hydrate.     Calcium goal is 1200mg/day from diet and supplement.   Vitamin D goal is 1000units daily     Patient Education   Preventive Care Advice   This is general advice given by our system to help you stay healthy. However, your care team may have specific advice just for you. Please talk to your care team about your preventive care needs.  Nutrition  Eat 5 or more servings of fruits and vegetables each day.  Try wheat bread, brown rice and whole grain pasta (instead of white bread, rice, and pasta).  Get enough calcium and vitamin D. Check the label on foods and aim for 100% of the RDA (recommended daily allowance).  Lifestyle  Exercise at least 150 minutes each week  (30 minutes a day, 5 days a week).  Do muscle strengthening activities 2 days a week. These help control your weight and prevent disease.  No smoking.  Wear sunscreen to prevent skin cancer.  Have a dental exam and cleaning every 6 months.  Yearly exams  See your health care team every year to talk about:  Any changes in your health.  Any medicines your care team has prescribed.  Preventive care, family planning, and ways to prevent chronic diseases.  Shots (vaccines)   HPV shots (up to age 26), if you've never had them before.  Hepatitis B shots (up to age 59), if you've never had them before.  COVID-19 shot: Get this shot when it's due.  Flu shot: Get a flu shot every year.  Tetanus shot: Get a tetanus shot every 10 years.  Pneumococcal, hepatitis A, and RSV shots: Ask your care team if you need these based on your risk.  Shingles shot (for age 50 and up)  General health tests  Diabetes screening:  Starting at age 35, Get screened for diabetes at  least every 3 years.  If you are younger than age 35, ask your care team if you should be screened for diabetes.  Cholesterol test: At age 39, start having a cholesterol test every 5 years, or more often if advised.  Bone density scan (DEXA): At age 50, ask your care team if you should have this scan for osteoporosis (brittle bones).  Hepatitis C: Get tested at least once in your life.  STIs (sexually transmitted infections)  Before age 24: Ask your care team if you should be screened for STIs.  After age 24: Get screened for STIs if you're at risk. You are at risk for STIs (including HIV) if:  You are sexually active with more than one person.  You don't use condoms every time.  You or a partner was diagnosed with a sexually transmitted infection.  If you are at risk for HIV, ask about PrEP medicine to prevent HIV.  Get tested for HIV at least once in your life, whether you are at risk for HIV or not.  Cancer screening tests  Cervical cancer screening: If you have a cervix, begin getting regular cervical cancer screening tests starting at age 21.  Breast cancer scan (mammogram): If you've ever had breasts, begin having regular mammograms starting at age 40. This is a scan to check for breast cancer.  Colon cancer screening: It is important to start screening for colon cancer at age 45.  Have a colonoscopy test every 10 years (or more often if you're at risk) Or, ask your provider about stool tests like a FIT test every year or Cologuard test every 3 years.  To learn more about your testing options, visit:   .  For help making a decision, visit:   https://bit.ly/zo76831.  Prostate cancer screening test: If you have a prostate, ask your care team if a prostate cancer screening test (PSA) at age 55 is right for you.  Lung cancer screening: If you are a current or former smoker ages 50 to 80, ask your care team if ongoing lung cancer screenings are right for you.  For informational purposes only. Not to replace the  "advice of your health care provider. Copyright   2023 [facility]. All rights reserved. Clinically reviewed by the [facility] Transitions Program. [employer] 169618 - REV 01/24.  9 Ways to Cut Back on Drinking  Maybe you've found yourself drinking more alcohol than you'd prefer. If you want to cut back, here are some ideas to try.    Think before you drink.  Do you really want a drink, or is it just a habit? If you're used to having a drink at a certain time, try doing something else then.     Look for substitutes.  Find some no-alcohol drinks that you enjoy, like flavored seltzer water, tea with honey, or tonic with a slice of lime. Or try alcohol-free beer or \"virgin\" cocktails (without the alcohol).     Drink more water.  Use water to quench your thirst. Drink a glass of water before you have any alcohol. Have another glass along with every drink or between drinks.     Shrink your drink.  For example, have a bottle of beer instead of a pint. Use a smaller glass for wine. Choose drinks with lower alcohol content (ABV%). Or use less liquor and more mixer in cocktails.     Slow down.  It's easy to drink quickly and without thinking about it. Pay attention, and make each drink last longer.     Do the math.  Total up how much you spend on alcohol each month. How much is that a year? If you cut back, what could you do with the money you save?     Take a break.  Choose a day or two each week when you won't drink at all. Notice how you feel on those days, physically and emotionally. How did you sleep? Do you feel better? Over time, add more break days.     Count calories.  Would you like to lose some weight? For some people that's a good motivator for cutting back. Figure out how many calories are in each drink. How many does that add up to in a day? In a week? In a month?     Practice saying no.  Be ready when someone offers you a drink. Try: \"Thanks, I've had enough.\" Or \"Thanks, but I'm cutting " "back.\" Or \"No, thanks. I feel better when I drink less.\"   Current as of: November 15, 2023  Content Version: 14.2 2024 [facility].   Care instructions adapted under license by your healthcare professional. If you have questions about a medical condition or this instruction, always ask your healthcare professional. [employer] disclaims any warranty or liability for your use of this information.     "

## 2024-10-23 ENCOUNTER — ANCILLARY PROCEDURE (OUTPATIENT)
Dept: MAMMOGRAPHY | Facility: OTHER | Age: 64
End: 2024-10-23
Attending: STUDENT IN AN ORGANIZED HEALTH CARE EDUCATION/TRAINING PROGRAM
Payer: COMMERCIAL

## 2024-10-23 ENCOUNTER — TELEPHONE (OUTPATIENT)
Dept: MAMMOGRAPHY | Facility: OTHER | Age: 64
End: 2024-10-23

## 2024-10-23 DIAGNOSIS — Z12.31 BREAST CANCER SCREENING BY MAMMOGRAM: ICD-10-CM

## 2024-10-23 PROCEDURE — 77067 SCR MAMMO BI INCL CAD: CPT | Mod: TC | Performed by: RADIOLOGY

## 2024-10-23 PROCEDURE — 77063 BREAST TOMOSYNTHESIS BI: CPT | Mod: TC | Performed by: RADIOLOGY

## 2024-10-28 ENCOUNTER — LAB (OUTPATIENT)
Dept: LAB | Facility: OTHER | Age: 64
End: 2024-10-28
Payer: COMMERCIAL

## 2024-10-28 DIAGNOSIS — R79.89 ELEVATED SERUM CREATININE: ICD-10-CM

## 2024-10-28 LAB
CREAT SERPL-MCNC: 0.73 MG/DL (ref 0.51–0.95)
EGFRCR SERPLBLD CKD-EPI 2021: >90 ML/MIN/1.73M2

## 2024-10-28 PROCEDURE — 82565 ASSAY OF CREATININE: CPT

## 2024-10-28 PROCEDURE — 36415 COLL VENOUS BLD VENIPUNCTURE: CPT

## 2024-10-29 ENCOUNTER — APPOINTMENT (OUTPATIENT)
Dept: LAB | Facility: OTHER | Age: 64
End: 2024-10-29
Payer: COMMERCIAL

## 2024-10-29 LAB
ALBUMIN UR-MCNC: NEGATIVE MG/DL
APPEARANCE UR: CLEAR
BILIRUB UR QL STRIP: NEGATIVE
COLOR UR AUTO: NORMAL
GLUCOSE UR STRIP-MCNC: NEGATIVE MG/DL
HGB UR QL STRIP: NEGATIVE
KETONES UR STRIP-MCNC: NEGATIVE MG/DL
LEUKOCYTE ESTERASE UR QL STRIP: NEGATIVE
NITRATE UR QL: NEGATIVE
PH UR STRIP: 7.5 [PH] (ref 4.7–8)
RBC URINE: 1 /HPF
SP GR UR STRIP: 1.01 (ref 1–1.03)
SQUAMOUS EPITHELIAL: 0 /HPF
UROBILINOGEN UR STRIP-MCNC: NORMAL MG/DL
WBC URINE: 2 /HPF

## 2024-10-29 PROCEDURE — 81001 URINALYSIS AUTO W/SCOPE: CPT | Performed by: STUDENT IN AN ORGANIZED HEALTH CARE EDUCATION/TRAINING PROGRAM

## 2024-10-30 ENCOUNTER — HOSPITAL ENCOUNTER (OUTPATIENT)
Dept: BONE DENSITY | Facility: HOSPITAL | Age: 64
Discharge: HOME | End: 2024-10-30
Attending: STUDENT IN AN ORGANIZED HEALTH CARE EDUCATION/TRAINING PROGRAM | Admitting: STUDENT IN AN ORGANIZED HEALTH CARE EDUCATION/TRAINING PROGRAM
Payer: COMMERCIAL

## 2024-10-30 DIAGNOSIS — Z78.0 ASYMPTOMATIC MENOPAUSAL STATE: ICD-10-CM

## 2024-10-30 PROCEDURE — 77080 DXA BONE DENSITY AXIAL: CPT

## 2024-11-18 ENCOUNTER — MYC REFILL (OUTPATIENT)
Dept: FAMILY MEDICINE | Facility: OTHER | Age: 64
End: 2024-11-18

## 2024-11-18 DIAGNOSIS — E78.2 MIXED HYPERLIPIDEMIA: Chronic | ICD-10-CM

## 2024-11-19 NOTE — TELEPHONE ENCOUNTER
rosuvastatin (CRESTOR) 10 MG tablet 30 tablet 0 10/21/2024     Last Office Visit: 10/21/2024  Future Office visit:       Routing refill request to provider for review/approval because:

## 2024-11-19 NOTE — TELEPHONE ENCOUNTER
Patient called because she was checking on status of medication Crestor. She was under the impression that dosage would be increased.

## 2024-11-20 RX ORDER — ROSUVASTATIN CALCIUM 10 MG/1
10 TABLET, COATED ORAL DAILY
Qty: 90 TABLET | Refills: 1 | Status: SHIPPED | OUTPATIENT
Start: 2024-11-20

## 2024-11-20 NOTE — TELEPHONE ENCOUNTER
Mixed hyperlipidemia / Muscle fatigue  Well-controlled on atorvastatin, however has significant myalgias which resolved within a week after stopping atorvastatin.  With her history of CVA, recommend trial of alternate statin.  Will start with Crestor 10 mg once daily.  If tolerating, in 1 month she will reach out and we will increase to 20 mg.  If side effects occur, we could consider pravastatin.  Recommend trial of at least 3 statins prior to discontinuing due to side effects with her CVA history.  - rosuvastatin (CRESTOR) 10 MG tablet; Take 1 tablet (10 mg) by mouth daily.

## 2025-02-13 DIAGNOSIS — I10 ESSENTIAL HYPERTENSION: Chronic | ICD-10-CM

## 2025-02-13 RX ORDER — LISINOPRIL 40 MG/1
40 TABLET ORAL DAILY
Qty: 90 TABLET | Refills: 2 | Status: SHIPPED | OUTPATIENT
Start: 2025-02-13

## 2025-02-13 RX ORDER — HYDROCHLOROTHIAZIDE 25 MG/1
25 TABLET ORAL DAILY
Qty: 90 TABLET | Refills: 2 | Status: SHIPPED | OUTPATIENT
Start: 2025-02-13

## 2025-02-13 RX ORDER — NIFEDIPINE 30 MG/1
30 TABLET, EXTENDED RELEASE ORAL DAILY
Qty: 90 TABLET | Refills: 2 | Status: SHIPPED | OUTPATIENT
Start: 2025-02-13

## 2025-02-13 NOTE — TELEPHONE ENCOUNTER
hydrochlorothiazide (HYDRODIURIL) 25 MG tablet      lisinopril (ZESTRIL) 40 MG tablet       NIFEdipine ER OSMOTIC (PROCARDIA XL) 30 MG 24 hr tablet       Last Written Prescription Date:  3-13-24  Last Fill Quantity: 90,   # refills: 3  Last Office Visit: 10-21-24  Future Office visit:    Next 5 appointments (look out 90 days)      Apr 21, 2025 9:00 AM  (Arrive by 8:45 AM)  Provider Visit with Virginia Green MD  [facility] ([facility] [place] ) [address]  [place] [address]  [phone]             Routing refill request to provider for review/approval because:  3 different medications

## 2025-04-07 NOTE — TELEPHONE ENCOUNTER
Discussed findings on CT renal arteries with patient. Will see nephrology to help with monitoring/management.   Has had carotid imaging - CT angio head/neck May 2023.   Will consider vascular appointment although reports home blood pressure has been reassuring.   Follow up next week with me to review.     Virginia Green MD       No. CELESTE screening performed.  STOP BANG Legend: 0-2 = LOW Risk; 3-4 = INTERMEDIATE Risk; 5-8 = HIGH Risk

## 2025-04-21 ENCOUNTER — OFFICE VISIT (OUTPATIENT)
Dept: FAMILY MEDICINE | Facility: OTHER | Age: 65
End: 2025-04-21
Attending: STUDENT IN AN ORGANIZED HEALTH CARE EDUCATION/TRAINING PROGRAM
Payer: COMMERCIAL

## 2025-04-21 ENCOUNTER — LAB (OUTPATIENT)
Dept: LAB | Facility: OTHER | Age: 65
End: 2025-04-21
Attending: STUDENT IN AN ORGANIZED HEALTH CARE EDUCATION/TRAINING PROGRAM
Payer: COMMERCIAL

## 2025-04-21 VITALS
HEIGHT: 65 IN | BODY MASS INDEX: 28.39 KG/M2 | OXYGEN SATURATION: 98 % | HEART RATE: 85 BPM | SYSTOLIC BLOOD PRESSURE: 122 MMHG | TEMPERATURE: 98.4 F | WEIGHT: 170.4 LBS | DIASTOLIC BLOOD PRESSURE: 72 MMHG

## 2025-04-21 DIAGNOSIS — E78.2 MIXED HYPERLIPIDEMIA: ICD-10-CM

## 2025-04-21 DIAGNOSIS — H93.13 TINNITUS, BILATERAL: ICD-10-CM

## 2025-04-21 DIAGNOSIS — I10 ESSENTIAL HYPERTENSION: Primary | ICD-10-CM

## 2025-04-21 DIAGNOSIS — I10 ESSENTIAL HYPERTENSION: ICD-10-CM

## 2025-04-21 LAB
ALBUMIN SERPL BCG-MCNC: 4.3 G/DL (ref 3.5–5.2)
ALP SERPL-CCNC: 65 U/L (ref 40–150)
ALT SERPL W P-5'-P-CCNC: 14 U/L (ref 0–50)
ANION GAP SERPL CALCULATED.3IONS-SCNC: 10 MMOL/L (ref 7–15)
AST SERPL W P-5'-P-CCNC: 21 U/L (ref 0–45)
BILIRUB SERPL-MCNC: 0.5 MG/DL
BUN SERPL-MCNC: 12.8 MG/DL (ref 8–23)
CALCIUM SERPL-MCNC: 10.1 MG/DL (ref 8.8–10.4)
CHLORIDE SERPL-SCNC: 98 MMOL/L (ref 98–107)
CHOLEST SERPL-MCNC: 265 MG/DL
CREAT SERPL-MCNC: 0.63 MG/DL (ref 0.51–0.95)
EGFRCR SERPLBLD CKD-EPI 2021: >90 ML/MIN/1.73M2
FASTING STATUS PATIENT QL REPORTED: NO
FASTING STATUS PATIENT QL REPORTED: NO
GLUCOSE SERPL-MCNC: 75 MG/DL (ref 70–99)
HCO3 SERPL-SCNC: 28 MMOL/L (ref 22–29)
HDLC SERPL-MCNC: 94 MG/DL
LDLC SERPL CALC-MCNC: 151 MG/DL
NONHDLC SERPL-MCNC: 171 MG/DL
POTASSIUM SERPL-SCNC: 4.5 MMOL/L (ref 3.4–5.3)
PROT SERPL-MCNC: 7.4 G/DL (ref 6.4–8.3)
SODIUM SERPL-SCNC: 136 MMOL/L (ref 135–145)
TRIGL SERPL-MCNC: 99 MG/DL

## 2025-04-21 PROCEDURE — 36415 COLL VENOUS BLD VENIPUNCTURE: CPT

## 2025-04-21 PROCEDURE — 80053 COMPREHEN METABOLIC PANEL: CPT

## 2025-04-21 PROCEDURE — 80061 LIPID PANEL: CPT

## 2025-04-21 RX ORDER — FLUOCINOLONE ACETONIDE 0.1 MG/ML
SOLUTION TOPICAL 2 TIMES DAILY
COMMUNITY
Start: 2024-12-19

## 2025-04-21 RX ORDER — PRAVASTATIN SODIUM 20 MG
20 TABLET ORAL DAILY
Qty: 30 TABLET | Refills: 0 | Status: SHIPPED | OUTPATIENT
Start: 2025-04-21

## 2025-04-21 ASSESSMENT — PAIN SCALES - GENERAL: PAINLEVEL_OUTOF10: MILD PAIN (3)

## 2025-04-21 NOTE — PROGRESS NOTES
Assessment & Plan     Essential hypertension  Well-controlled.  Renal function stable.  Continues on lisinopril, hydrochlorothiazide, and Procardia.  Recheck again in 6 months.  Does follow with nephrology for possible renal artery stenosis.  They reviewed her CT angiogram and noted tortuosity of the right renal artery but no narrowing.  Goal is good blood pressure control, low-sodium diet, known smoking and to maintain weight.  - Comprehensive metabolic panel; Future    Mixed hyperlipidemia  Has been statin intolerant to Crestor and atorvastatin with myalgias.  Will trial pravastatin 20 mg once daily.  She will let me know in a month if she is tolerating or not.  If tolerating, should update lipid profile and AST, ALT.  - Comprehensive metabolic panel; Future  - Lipid Profile (Chol, Trig, HDL, LDL calc); Future  - pravastatin (PRAVACHOL) 20 MG tablet; Take 1 tablet (20 mg) by mouth daily.    Tinnitus, bilateral  Describes noisiness in her head ongoing for a long time.  I do wonder if this is a tinnitus.  MRA of her brain and MRI were normal in August, this was completed for other reasons.  There is no whooshing.  Does have history of fibromuscular dysplasia of renal arteries however. Will get audiology evaluation and ent input. ?follow up with neuro if no obvious findings on testing.   - Adult Audiology  Referral; Future  - Adult ENT  Referral; Future        The longitudinal plan of care for the diagnosis(es)/condition(s) as documented were addressed during this visit. Due to the added complexity in care, I will continue to support Jeannie in the subsequent management and with ongoing continuity of care.    Shaun Dennis is a 65 year old, presenting for the following health issues:  Hypertension        4/21/2025     8:41 AM   Additional Questions   Roomed by Edita MCCRARY   Accompanied by self     History of Present Illness       Reason for visit:  Physical    She eats 2-3 servings of fruits  "and vegetables daily.She consumes 1 sweetened beverage(s) daily.She exercises with enough effort to increase her heart rate 30 to 60 minutes per day.  She exercises with enough effort to increase her heart rate 5 days per week.   She is taking medications regularly.        Hyperlipidemia Follow-Up    Are you regularly taking any medication or supplement to lower your cholesterol?   Yes- Crestor 10 mg  - stopped taking 12/31 had neuropathy in feet.   Are you having muscle aches or other side effects that you think could be caused by your cholesterol lowering medication?  Yes- neuropathy in feet and very week     Stopped taking crestor in Dec. Took sept to Dec.   Symptoms resolved after stopping.     Hypertension Follow-up    Do you check your blood pressure regularly outside of the clinic? Yes periodically   Are you following a low salt diet? No  Are your blood pressures ever more than 140 on the top number (systolic) OR more   than 90 on the bottom number (diastolic), for example 140/90? No    BP up to 132 systolic at home   No lows     BP Readings from Last 2 Encounters:   04/21/25 122/72   10/21/24 110/64     Feels like head is noisy all the time. Ongoing for a long time. Not whooshing. Humming at times. History of sudden deafness in right ear in her 30s, reports they brought her hearing back. Still some trouble hearing with right ear. Can drown out with other noise. No pain. Not worse on one side.     MRA brain 8/12/24  MR brain 8/12/24         Objective    /72 (BP Location: Left arm, Patient Position: Sitting, Cuff Size: Adult Regular)   Pulse 85   Temp 98.4  F (36.9  C) (Tympanic)   Ht 1.651 m (5' 5\")   Wt 77.3 kg (170 lb 6.4 oz)   SpO2 98%   BMI 28.36 kg/m    Body mass index is 28.36 kg/m .    Physical Exam  Constitutional:       General: She is not in acute distress.     Appearance: Normal appearance.   HENT:      Right Ear: Tympanic membrane and ear canal normal.      Left Ear: Tympanic membrane " and ear canal normal.   Cardiovascular:      Rate and Rhythm: Normal rate and regular rhythm.      Heart sounds: No murmur heard.  Pulmonary:      Effort: Pulmonary effort is normal.      Breath sounds: Normal breath sounds. No wheezing, rhonchi or rales.   Neurological:      General: No focal deficit present.      Mental Status: She is alert and oriented to person, place, and time.   Psychiatric:         Mood and Affect: Mood normal.         Behavior: Behavior normal.            Results for orders placed or performed in visit on 04/21/25   Comprehensive metabolic panel     Status: None   Result Value Ref Range    Sodium 136 135 - 145 mmol/L    Potassium 4.5 3.4 - 5.3 mmol/L    Carbon Dioxide (CO2) 28 22 - 29 mmol/L    Anion Gap 10 7 - 15 mmol/L    Urea Nitrogen 12.8 8.0 - 23.0 mg/dL    Creatinine 0.63 0.51 - 0.95 mg/dL    GFR Estimate >90 >60 mL/min/1.73m2    Calcium 10.1 8.8 - 10.4 mg/dL    Chloride 98 98 - 107 mmol/L    Glucose 75 70 - 99 mg/dL    Alkaline Phosphatase 65 40 - 150 U/L    AST 21 0 - 45 U/L    ALT 14 0 - 50 U/L    Protein Total 7.4 6.4 - 8.3 g/dL    Albumin 4.3 3.5 - 5.2 g/dL    Bilirubin Total 0.5 <=1.2 mg/dL    Patient Fasting > 8hrs? No    Lipid Profile (Chol, Trig, HDL, LDL calc)     Status: Abnormal   Result Value Ref Range    Cholesterol 265 (H) <200 mg/dL    Triglycerides 99 <150 mg/dL    Direct Measure HDL 94 >=50 mg/dL    LDL Cholesterol Calculated 151 (H) <100 mg/dL    Non HDL Cholesterol 171 (H) <130 mg/dL    Patient Fasting > 8hrs? No     Narrative    Cholesterol  Desirable: < 200 mg/dL  Borderline High: 200 - 239 mg/dL  High: >= 240 mg/dL    Triglycerides  Normal: < 150 mg/dL  Borderline High: 150 - 199 mg/dL  High: 200-499 mg/dL  Very High: >= 500 mg/dL    Direct Measure HDL  Female: >= 50 mg/dL   Male: >= 40 mg/dL    LDL Cholesterol  Desirable: < 100 mg/dL  Above Desirable: 100 - 129 mg/dL   Borderline High: 130 - 159 mg/dL   High:  160 - 189 mg/dL   Very High: >= 190 mg/dL    Non HDL  Cholesterol  Desirable: < 130 mg/dL  Above Desirable: 130 - 159 mg/dL  Borderline High: 160 - 189 mg/dL  High: 190 - 219 mg/dL  Very High: >= 220 mg/dL             Signed Electronically by: Virginia Green MD

## 2025-04-21 NOTE — PATIENT INSTRUCTIONS
If tolerating pravastatin at one month, should request refill and update AST/ALT liver enzymes   ENT will call for appointment and hearing test

## 2025-05-12 DIAGNOSIS — E78.2 MIXED HYPERLIPIDEMIA: ICD-10-CM

## 2025-05-12 RX ORDER — PRAVASTATIN SODIUM 20 MG
20 TABLET ORAL DAILY
Qty: 30 TABLET | Refills: 0 | Status: SHIPPED | OUTPATIENT
Start: 2025-05-12

## 2025-05-12 NOTE — TELEPHONE ENCOUNTER
Pravastatin  Last Written Prescription Date: 4/21/25  Last Fill Quantity: 30 # of Refills: 0  Last Office Visit: 4/21/25

## 2025-06-16 DIAGNOSIS — E78.2 MIXED HYPERLIPIDEMIA: ICD-10-CM

## 2025-06-16 NOTE — TELEPHONE ENCOUNTER
pravastatin (PRAVACHOL) 20 MG tablet       Last Written Prescription Date:  5/12/2025  Last Fill Quantity: 30,   # refills: 0  Last Office Visit: 4/21/2025

## 2025-06-17 RX ORDER — PRAVASTATIN SODIUM 20 MG
20 TABLET ORAL DAILY
Qty: 30 TABLET | Refills: 0 | Status: SHIPPED | OUTPATIENT
Start: 2025-06-17

## 2025-06-30 ENCOUNTER — OFFICE VISIT (OUTPATIENT)
Dept: AUDIOLOGY | Facility: OTHER | Age: 65
End: 2025-06-30
Attending: AUDIOLOGIST
Payer: COMMERCIAL

## 2025-06-30 ENCOUNTER — OFFICE VISIT (OUTPATIENT)
Dept: OTOLARYNGOLOGY | Facility: OTHER | Age: 65
End: 2025-06-30
Attending: AUDIOLOGIST
Payer: COMMERCIAL

## 2025-06-30 VITALS
BODY MASS INDEX: 27.49 KG/M2 | HEART RATE: 83 BPM | DIASTOLIC BLOOD PRESSURE: 78 MMHG | SYSTOLIC BLOOD PRESSURE: 120 MMHG | HEIGHT: 65 IN | RESPIRATION RATE: 16 BRPM | OXYGEN SATURATION: 98 % | TEMPERATURE: 98 F | WEIGHT: 165 LBS

## 2025-06-30 DIAGNOSIS — H93.13 TINNITUS, BILATERAL: ICD-10-CM

## 2025-06-30 DIAGNOSIS — H90.6 MIXED CONDUCTIVE AND SENSORINEURAL HEARING LOSS OF BOTH EARS: Primary | ICD-10-CM

## 2025-06-30 DIAGNOSIS — H90.6 MIXED HEARING LOSS, BILATERAL: Primary | ICD-10-CM

## 2025-06-30 PROCEDURE — 3074F SYST BP LT 130 MM HG: CPT | Performed by: NURSE PRACTITIONER

## 2025-06-30 PROCEDURE — 3078F DIAST BP <80 MM HG: CPT | Performed by: NURSE PRACTITIONER

## 2025-06-30 PROCEDURE — 99213 OFFICE O/P EST LOW 20 MIN: CPT | Mod: 25 | Performed by: NURSE PRACTITIONER

## 2025-06-30 PROCEDURE — 92504 EAR MICROSCOPY EXAMINATION: CPT | Performed by: NURSE PRACTITIONER

## 2025-06-30 PROCEDURE — 1126F AMNT PAIN NOTED NONE PRSNT: CPT | Performed by: NURSE PRACTITIONER

## 2025-06-30 ASSESSMENT — PAIN SCALES - GENERAL: PAINLEVEL_OUTOF10: NO PAIN (0)

## 2025-06-30 NOTE — PROGRESS NOTES
Audiology Evaluation Completed. Please refer SCANNED AUDIOGRAM and/or TYMPANOGRAM for BACKGROUND, RESULTS, RECOMMENDATIONS.      Krupa DIALLO, [facility]  Audiologist #[id number]

## 2025-06-30 NOTE — PROGRESS NOTES
Otolaryngology Note         Chief Complaint:     Patient presents with:  Hearing Problem: Tinnitus, bilateral, Virginia Green MD referring           History of Present Illness:     Jeannie Phelps is a 65 year old female who presents today with a chief complaint of tinnitus.     Reports previous history right ear procedure in the distant past (30 years ago due to hearing loss) - sounds like possible stapes repair     History of CVA x 2 in May 2023 and March 2023 - 5 weeks apart  She had updated MRI and MRA brain completed in August 2024:  FINDINGS: There are no acute infarcts. The ventricular system is  normal in size. There are scattered foci of bright signal in the white  matter both cerebral hemispheres which is stable from previous  examination in May 2023 there are no enhancing masses ventricular  shifts or extracerebral collections. Brainstem and cerebellum appear  normal. The pituitary and optic chiasm are normal. Basal cisterns and  internal auditory canals are normal. The cranial vault is intact.                                                                        IMPRESSION: No acute brain infarcts.     Small vessel changes in both cerebral hemispheres.     No enhancing masses or ventricular shifts.    She is experiencing constant high pitched tinnitus in her head (cannot localize it to one side or the other) that has been present since her CVA    No concerns for rotary vertigo/dizziness  She still notes some imbalance since CVA  She has had some falls off and on.      No fluctuating hearing loss  She has had a couple head traumas due to falls while playing pickleball and again in curling last October 2024.     + history of noise exposure, worked at [employer], wore [employer] in later years, not in early years    + family history of hearing loss/tinnitus - grandma was completely deaf. Brother x 2 have hearing aids.      No current otalgia/ottorhea  Takes baby ASA daily, no other high dose ASA  Takes tylenol as  needed for pain   2 large cups of coffee in the morning  No heavy ETOH  Never smoker  Minimal Stress  Moderate Sodium    Audiogram completed 6/30/2025:  Tympanogram shows right type AD, left type a  Right threshold mild mixed, primarily conductive hearing loss  Left threshold normal sloping to slight rising to normal conductive hearing loss  SRT right 25 dB, left 15 dB  Word recognition is 100% at 60 dB on the right and 100% at 55 dB         Medications:     Current Outpatient Rx   Medication Sig Dispense Refill    ASPIRIN LOW DOSE 81 MG chewable tablet Take 81 mg by mouth daily      cetirizine (ZYRTEC) 10 MG tablet Take 10 mg by mouth daily as needed for allergies      hydrochlorothiazide (HYDRODIURIL) 25 MG tablet Take 1 tablet by mouth once daily 90 tablet 2    Multiple Vitamins-Minerals (MULTIVITAMIN ADULT PO) Take 1 tablet by mouth daily      NIFEdipine ER OSMOTIC (PROCARDIA XL) 30 MG 24 hr tablet Take 1 tablet by mouth once daily 90 tablet 2    pravastatin (PRAVACHOL) 20 MG tablet Take 1 tablet by mouth once daily 30 tablet 0    tazarotene (TAZORAC) 0.05 % external gel Apply topically as needed.      TURMERIC PO Take 1,500 mg by mouth daily      EPINEPHrine (ANY BX GENERIC EQUIV) 0.3 MG/0.3ML injection 2-pack INJECT CONTENTS OF 1 PEN AS NEEDED FOR ALLERGIC REACTION (Patient not taking: Reported on 4/21/2025) 2 each 1    fluocinolone (SYNALAR) 0.01 % solution Apply topically 2 times daily. (Patient not taking: Reported on 6/30/2025)      ivermectin (SOOLANTRA) 1 % cream Apply topically daily. Apply to the affected areas of the face once daily (Patient not taking: Reported on 6/30/2025)      lisinopril (ZESTRIL) 40 MG tablet Take 1 tablet by mouth once daily (Patient not taking: Reported on 6/30/2025) 90 tablet 2            Allergies:     Allergies: Rofecoxib, Hydrocodone, Influenza vaccines, Metoprolol, Amlodipine, and Cefuroxime          Past Medical History:     Past Medical History:   Diagnosis Date    Acute  stroke due to ischemia (H) 2023    Bunion 10/12/2017    Overview:  Added automatically from request for surgery [id number]    Dermatitis due to sun 2013    Diarrhea in adult patient 2023    Formatting of this note might be different from the original. Daily diasrrhea x 2 months    Discoid lupus erythematosus 2008    h/o plaquenil - misdiagnosis    History of basal cell carcinoma     Hyperlipidemia LDL goal <70     Hypertension     Lyme disease 2013    Overview:  Lyme Disease-Deer tick bite 13.  Initially noted a red target lesion on  right lower back    Renal artery stenosis 2023    Per nephrology - Reviewed the CT angiogram abdomen and she has some tortuosity of the right renal artery but no narrowing.       Status post placement of implantable loop recorder 2024    Formatting of this note might be different from the original.   24: Patient called and had [employer] stop their monitoring on their ILR due to out of pocket expenses.  Called patient and they confirmed this.  They verbalized understanding of having monitoring suspended.              Past Surgical History:     Past Surgical History:   Procedure Laterality Date    BIOPSY BREAST      10 years ago - [facility] / Benign    CATARACT EXTRACTION Left 04/15/2025     SECTION      3x    CHOLECYSTECTOMY      COLONOSCOPY N/A 10/02/2023    Procedure: COLONOSCOPY with possible biopsy, possible polypectomy;  Surgeon: Jonathan Pillai MD;  Location: [facility] OR    COLONOSCOPY N/A 10/02/2023    Procedure: COLONOSCOPY with biopsies;  Surgeon: Jonathan Pillai MD;  Location: [facility] OR    HYSTERECTOMY VAGINAL      Bilateral ovaries remain; Performed for abnormal uterine bleeding. Per [facility] Everywhere Summary.    MANDIBLE SURGERY              Social History:     Social History     Tobacco Use    Smoking status: Never    Smokeless tobacco: Never   Vaping Use     "Vaping status: Never Used   Substance Use Topics    Alcohol use: Yes     Comment: 2 night    Drug use: No            Review of Systems:     ROS: See HPI         Physical Exam:     /78 (BP Location: Right arm, Patient Position: Sitting, Cuff Size: Adult Regular)   Pulse 83   Temp 98  F (36.7  C) (Tympanic)   Resp 16   Ht 1.651 m (5' 5\")   Wt 74.8 kg (165 lb)   SpO2 98%   BMI 27.46 kg/m      General - The patient is well nourished and well developed, and appears to have good nutritional status.  Alert and oriented to person and place, answers questions and cooperates with examination appropriately.   Head and Face - Normocephalic and atraumatic, with no gross asymmetry noted.  The facial nerve is intact, with strong symmetric movements.  Voice and Breathing - The patient was breathing comfortably without the use of accessory muscles. There was no wheezing, stridor. The patients voice was clear and strong, and had appropriate pitch and quality.  Ears - External ear normal. Canals are patent. The ears were examined with binocular microscopy and with otoscope.  Bilateral TMs are intact without effusion or retraction.  No masses or abnormalities noted.   Eyes - Extraocular movements intact, sclera were not icteric or injected, conjunctiva were pink and moist.  Mouth - Examination of the oral cavity showed pink, healthy oral mucosa. Dentition in good condition. No lesions or ulcerations noted. The tongue was mobile and midline.   Throat - The walls of the oropharynx were smooth, pink, moist, symmetric, and had no lesions or ulcerations.  The tonsillar pillars and soft palate were symmetric. The uvula was midline on elevation.    Neck - Normal midline excursion of the laryngotracheal complex during swallowing.  Full range of motion on passive movement.  Palpation of the occipital, submental, submandibular, internal jugular chain, and supraclavicular nodes did not demonstrate any abnormal lymph nodes or masses.  " Palpation of the thyroid was soft and smooth, with no nodules or goiter appreciated.  The trachea was mobile and midline.  Nose - External contour is symmetric, no gross deflection or scars.  Nasal mucosa is pink and moist with no abnormal mucus.  The septum and turbinates were evaluated with nasal speculum, no polyps, masses, or purulence noted on examination.         Assessment and Plan:       ICD-10-CM    1. Mixed hearing loss, bilateral  H90.6       2. Tinnitus, bilateral  H93.13 Adult ENT  Referral          Tinnitus education was provided.  Tinnitus is widely considered a disorder of cental auditory processing.       Hearing preservation was reinforced     I also cautioned the patient against investing in any oral supplements advertised to cure tinnitus.     I have also recommended yearly audiograms, masking devices or apps.  For worsening symptoms, I recommend online or in person cognitive behavioral therapy (CBT) referral.     The patient will follow up as necessary for worsening symptoms or changes in symptoms.       Please follow up as needed for worsening symptoms, changes in symptoms, or signs of infection.  If there is any dizziness or facial weakness, call for a recheck.     Tinnitus has been present since 2023, MRI completed in Aug 2024 showed normal CPA and IACs.  If symptoms worsen or new concerns develop recommend MRI IAC.     Kathy Robledo NP-C  [facility]NT

## 2025-06-30 NOTE — LETTER
6/30/2025      Jeannie Phelps  [address] [facility]  [place] [address]      Dear Colleague,    Thank you for referring your patient, Jeannie Phelps, to the [facility] [place]. Please see a copy of my visit note below.      Otolaryngology Note         Chief Complaint:     Patient presents with:  Hearing Problem: Tinnitus, bilateral, Virginia Green MD referring           History of Present Illness:     Jeannie Phelps is a 65 year old female who presents today with a chief complaint of tinnitus.     Reports previous history right ear procedure in the distant past (30 years ago due to hearing loss) - sounds like possible stapes repair     History of CVA x 2 in May 2023 and March 2023 - 5 weeks apart  She had updated MRI and MRA brain completed in August 2024:  FINDINGS: There are no acute infarcts. The ventricular system is  normal in size. There are scattered foci of bright signal in the white  matter both cerebral hemispheres which is stable from previous  examination in May 2023 there are no enhancing masses ventricular  shifts or extracerebral collections. Brainstem and cerebellum appear  normal. The pituitary and optic chiasm are normal. Basal cisterns and  internal auditory canals are normal. The cranial vault is intact.                                                                        IMPRESSION: No acute brain infarcts.     Small vessel changes in both cerebral hemispheres.     No enhancing masses or ventricular shifts.    She is experiencing constant high pitched tinnitus in her head (cannot localize it to one side or the other) that has been present since her CVA    No concerns for rotary vertigo/dizziness  She still notes some imbalance since CVA  She has had some falls off and on.      No fluctuating hearing loss  She has had a couple head traumas due to falls while playing pickleball and again in curling last October 2024.     + history of noise exposure, worked at [employer],  wore [employer] in later years, not in early years    + family history of hearing loss/tinnitus - grandma was completely deaf. Brother x 2 have hearing aids.      No current otalgia/ottorhea  Takes baby ASA daily, no other high dose ASA  Takes tylenol as needed for pain   2 large cups of coffee in the morning  No heavy ETOH  Never smoker  Minimal Stress  Moderate Sodium    Audiogram completed 6/30/2025:  Tympanogram shows right type AD, left type a  Right threshold mild mixed, primarily conductive hearing loss  Left threshold normal sloping to slight rising to normal conductive hearing loss  SRT right 25 dB, left 15 dB  Word recognition is 100% at 60 dB on the right and 100% at 55 dB         Medications:     Current Outpatient Rx   Medication Sig Dispense Refill     ASPIRIN LOW DOSE 81 MG chewable tablet Take 81 mg by mouth daily       cetirizine (ZYRTEC) 10 MG tablet Take 10 mg by mouth daily as needed for allergies       hydrochlorothiazide (HYDRODIURIL) 25 MG tablet Take 1 tablet by mouth once daily 90 tablet 2     Multiple Vitamins-Minerals (MULTIVITAMIN ADULT PO) Take 1 tablet by mouth daily       NIFEdipine ER OSMOTIC (PROCARDIA XL) 30 MG 24 hr tablet Take 1 tablet by mouth once daily 90 tablet 2     pravastatin (PRAVACHOL) 20 MG tablet Take 1 tablet by mouth once daily 30 tablet 0     tazarotene (TAZORAC) 0.05 % external gel Apply topically as needed.       TURMERIC PO Take 1,500 mg by mouth daily       EPINEPHrine (ANY BX GENERIC EQUIV) 0.3 MG/0.3ML injection 2-pack INJECT CONTENTS OF 1 PEN AS NEEDED FOR ALLERGIC REACTION (Patient not taking: Reported on 4/21/2025) 2 each 1     fluocinolone (SYNALAR) 0.01 % solution Apply topically 2 times daily. (Patient not taking: Reported on 6/30/2025)       ivermectin (SOOLANTRA) 1 % cream Apply topically daily. Apply to the affected areas of the face once daily (Patient not taking: Reported on 6/30/2025)       lisinopril (ZESTRIL) 40 MG tablet Take 1 tablet by mouth once daily  (Patient not taking: Reported on 2025) 90 tablet 2            Allergies:     Allergies: Rofecoxib, Hydrocodone, Influenza vaccines, Metoprolol, Amlodipine, and Cefuroxime          Past Medical History:     Past Medical History:   Diagnosis Date     Acute stroke due to ischemia (H) 2023     Bunion 10/12/2017    Overview:  Added automatically from request for surgery [id number]     Dermatitis due to sun 2013     Diarrhea in adult patient 2023    Formatting of this note might be different from the original. Daily diasrrhea x 2 months     Discoid lupus erythematosus 2008    h/o plaquenil - misdiagnosis     History of basal cell carcinoma      Hyperlipidemia LDL goal <70      Hypertension      Lyme disease 2013    Overview:  Lyme Disease-Deer tick bite 13.  Initially noted a red target lesion on  right lower back     Renal artery stenosis 2023    Per nephrology - Reviewed the CT angiogram abdomen and she has some tortuosity of the right renal artery but no narrowing.        Status post placement of implantable loop recorder 2024    Formatting of this note might be different from the original.   24: Patient called and had [employer] stop their monitoring on their ILR due to out of pocket expenses.  Called patient and they confirmed this.  They verbalized understanding of having monitoring suspended.              Past Surgical History:     Past Surgical History:   Procedure Laterality Date     BIOPSY BREAST      10 years ago - [facility] / Benign     CATARACT EXTRACTION Left 04/15/2025      SECTION      3x     CHOLECYSTECTOMY       COLONOSCOPY N/A 10/02/2023    Procedure: COLONOSCOPY with possible biopsy, possible polypectomy;  Surgeon: Jonathan Pillai MD;  Location: [facility] OR     COLONOSCOPY N/A 10/02/2023    Procedure: COLONOSCOPY with biopsies;  Surgeon: Jonathan Pillai MD;  Location: [facility] OR     HYSTERECTOMY VAGINAL       "Bilateral ovaries remain; Performed for abnormal uterine bleeding. Per [facility] Care Everywhere Summary.     MANDIBLE SURGERY  1980            Social History:     Social History     Tobacco Use     Smoking status: Never     Smokeless tobacco: Never   Vaping Use     Vaping status: Never Used   Substance Use Topics     Alcohol use: Yes     Comment: 2 night     Drug use: No            Review of Systems:     ROS: See HPI         Physical Exam:     /78 (BP Location: Right arm, Patient Position: Sitting, Cuff Size: Adult Regular)   Pulse 83   Temp 98  F (36.7  C) (Tympanic)   Resp 16   Ht 1.651 m (5' 5\")   Wt 74.8 kg (165 lb)   SpO2 98%   BMI 27.46 kg/m      General - The patient is well nourished and well developed, and appears to have good nutritional status.  Alert and oriented to person and place, answers questions and cooperates with examination appropriately.   Head and Face - Normocephalic and atraumatic, with no gross asymmetry noted.  The facial nerve is intact, with strong symmetric movements.  Voice and Breathing - The patient was breathing comfortably without the use of accessory muscles. There was no wheezing, stridor. The patients voice was clear and strong, and had appropriate pitch and quality.  Ears - External ear normal. Canals are patent. The ears were examined with binocular microscopy and with otoscope.  Bilateral TMs are intact without effusion or retraction.  No masses or abnormalities noted.   Eyes - Extraocular movements intact, sclera were not icteric or injected, conjunctiva were pink and moist.  Mouth - Examination of the oral cavity showed pink, healthy oral mucosa. Dentition in good condition. No lesions or ulcerations noted. The tongue was mobile and midline.   Throat - The walls of the oropharynx were smooth, pink, moist, symmetric, and had no lesions or ulcerations.  The tonsillar pillars and soft palate were symmetric. The uvula was midline on elevation.    Neck " - Normal midline excursion of the laryngotracheal complex during swallowing.  Full range of motion on passive movement.  Palpation of the occipital, submental, submandibular, internal jugular chain, and supraclavicular nodes did not demonstrate any abnormal lymph nodes or masses.  Palpation of the thyroid was soft and smooth, with no nodules or goiter appreciated.  The trachea was mobile and midline.  Nose - External contour is symmetric, no gross deflection or scars.  Nasal mucosa is pink and moist with no abnormal mucus.  The septum and turbinates were evaluated with nasal speculum, no polyps, masses, or purulence noted on examination.         Assessment and Plan:       ICD-10-CM    1. Mixed hearing loss, bilateral  H90.6       2. Tinnitus, bilateral  H93.13 Adult ENT  Referral          Tinnitus education was provided.  Tinnitus is widely considered a disorder of cental auditory processing.       Hearing preservation was reinforced     I also cautioned the patient against investing in any oral supplements advertised to cure tinnitus.     I have also recommended yearly audiograms, masking devices or apps.  For worsening symptoms, I recommend online or in person cognitive behavioral therapy (CBT) referral.     The patient will follow up as necessary for worsening symptoms or changes in symptoms.       Please follow up as needed for worsening symptoms, changes in symptoms, or signs of infection.  If there is any dizziness or facial weakness, call for a recheck.     Tinnitus has been present since 2023, MRI completed in Aug 2024 showed normal CPA and IACs.  If symptoms worsen or new concerns develop recommend MRI IAC.     Kathy BUCK  [facility]    Again, thank you for allowing me to participate in the care of your patient.        Sincerely,        Kathy Robledo NP    Electronically signed

## 2025-06-30 NOTE — PATIENT INSTRUCTIONS
Recommend an annual audiogram you can make that appointment today before you leave    Tinnitus education given    Thank you for allowing Kathy Robledo and our ENT team to participate in your care.  If your medications are too expensive, please give the nurse a call.  We can possibly change this medication.  If you have a scheduling or an appointment question please contact our Health Unit Coordinator at their direct line [phone]  ALL nursing questions or concerns can be directed to your ENT nurse at: [phone] - [place]

## 2025-07-13 DIAGNOSIS — E78.2 MIXED HYPERLIPIDEMIA: ICD-10-CM

## 2025-07-14 RX ORDER — PRAVASTATIN SODIUM 20 MG
20 TABLET ORAL DAILY
Qty: 30 TABLET | Refills: 0 | Status: SHIPPED | OUTPATIENT
Start: 2025-07-14

## 2025-08-08 ENCOUNTER — TELEPHONE (OUTPATIENT)
Dept: FAMILY MEDICINE | Facility: OTHER | Age: 65
End: 2025-08-08

## 2025-08-09 DIAGNOSIS — E78.2 MIXED HYPERLIPIDEMIA: ICD-10-CM

## 2025-08-11 RX ORDER — PRAVASTATIN SODIUM 20 MG
20 TABLET ORAL DAILY
Qty: 30 TABLET | Refills: 4 | Status: SHIPPED | OUTPATIENT
Start: 2025-08-11

## 2025-08-18 ENCOUNTER — OFFICE VISIT (OUTPATIENT)
Dept: FAMILY MEDICINE | Facility: OTHER | Age: 65
End: 2025-08-18
Attending: STUDENT IN AN ORGANIZED HEALTH CARE EDUCATION/TRAINING PROGRAM
Payer: COMMERCIAL

## 2025-08-18 VITALS
DIASTOLIC BLOOD PRESSURE: 70 MMHG | WEIGHT: 168.2 LBS | OXYGEN SATURATION: 98 % | TEMPERATURE: 97.8 F | SYSTOLIC BLOOD PRESSURE: 126 MMHG | BODY MASS INDEX: 28.02 KG/M2 | HEIGHT: 65 IN | HEART RATE: 92 BPM

## 2025-08-18 DIAGNOSIS — E78.2 MIXED HYPERLIPIDEMIA: ICD-10-CM

## 2025-08-18 DIAGNOSIS — M54.50 CHRONIC RIGHT-SIDED LOW BACK PAIN WITHOUT SCIATICA: Primary | ICD-10-CM

## 2025-08-18 DIAGNOSIS — M79.10 MYALGIA: ICD-10-CM

## 2025-08-18 DIAGNOSIS — G89.29 CHRONIC RIGHT-SIDED LOW BACK PAIN WITHOUT SCIATICA: Primary | ICD-10-CM

## 2025-08-18 PROCEDURE — 3078F DIAST BP <80 MM HG: CPT | Performed by: STUDENT IN AN ORGANIZED HEALTH CARE EDUCATION/TRAINING PROGRAM

## 2025-08-18 PROCEDURE — 3074F SYST BP LT 130 MM HG: CPT | Performed by: STUDENT IN AN ORGANIZED HEALTH CARE EDUCATION/TRAINING PROGRAM

## 2025-08-18 PROCEDURE — 99214 OFFICE O/P EST MOD 30 MIN: CPT | Performed by: STUDENT IN AN ORGANIZED HEALTH CARE EDUCATION/TRAINING PROGRAM

## 2025-08-18 PROCEDURE — G2211 COMPLEX E/M VISIT ADD ON: HCPCS | Performed by: STUDENT IN AN ORGANIZED HEALTH CARE EDUCATION/TRAINING PROGRAM

## 2025-08-18 PROCEDURE — 1125F AMNT PAIN NOTED PAIN PRSNT: CPT | Performed by: STUDENT IN AN ORGANIZED HEALTH CARE EDUCATION/TRAINING PROGRAM

## 2025-08-18 ASSESSMENT — PAIN SCALES - GENERAL: PAINLEVEL_OUTOF10: MILD PAIN (2)

## 2025-08-18 ASSESSMENT — ENCOUNTER SYMPTOMS: BACK PAIN: 1

## (undated) DEVICE — CANISTER SUCTION MEDI-VAC GUARDIAN 2000ML 90D 65651-220

## (undated) DEVICE — CONNECTOR ERBEFLO 2 PORT 20325-215

## (undated) DEVICE — FORCEPS BIOPSY RADIAL JAW 4 LARGE W/NEEDLE 240CM M00513332

## (undated) DEVICE — TUBING SUCTION 20FT N620A

## (undated) DEVICE — SOL WATER IRRIG 1000ML BOTTLE 2F7114

## (undated) RX ORDER — PROPOFOL 10 MG/ML
INJECTION, EMULSION INTRAVENOUS
Status: DISPENSED
Start: 2023-10-02